# Patient Record
Sex: MALE | Race: WHITE | NOT HISPANIC OR LATINO | Employment: OTHER | URBAN - METROPOLITAN AREA
[De-identification: names, ages, dates, MRNs, and addresses within clinical notes are randomized per-mention and may not be internally consistent; named-entity substitution may affect disease eponyms.]

---

## 2017-01-07 ENCOUNTER — HOSPITAL ENCOUNTER (OUTPATIENT)
Dept: LAB | Facility: MEDICAL CENTER | Age: 72
End: 2017-01-07
Attending: UROLOGY
Payer: MEDICARE

## 2017-01-07 LAB
ALBUMIN SERPL BCP-MCNC: 3.9 G/DL (ref 3.2–4.9)
ALBUMIN/GLOB SERPL: 1.6 G/DL
ALP SERPL-CCNC: 39 U/L (ref 30–99)
ALT SERPL-CCNC: 25 U/L (ref 2–50)
ANION GAP SERPL CALC-SCNC: 7 MMOL/L (ref 0–11.9)
APPEARANCE UR: CLEAR
AST SERPL-CCNC: 25 U/L (ref 12–45)
BASOPHILS # BLD AUTO: 0.03 K/UL (ref 0–0.12)
BASOPHILS NFR BLD AUTO: 0.5 % (ref 0–1.8)
BILIRUB SERPL-MCNC: 0.8 MG/DL (ref 0.1–1.5)
BILIRUB UR QL STRIP.AUTO: NEGATIVE
BUN SERPL-MCNC: 20 MG/DL (ref 8–22)
CALCIUM SERPL-MCNC: 9.3 MG/DL (ref 8.5–10.5)
CHLORIDE SERPL-SCNC: 107 MMOL/L (ref 96–112)
CO2 SERPL-SCNC: 24 MMOL/L (ref 20–33)
COLOR UR AUTO: YELLOW
CREAT SERPL-MCNC: 1.39 MG/DL (ref 0.5–1.4)
EOSINOPHIL # BLD: 0.15 K/UL (ref 0–0.51)
EOSINOPHIL NFR BLD AUTO: 2.7 % (ref 0–6.9)
EPITHELIAL CELLS 1715: ABNORMAL /HPF
ERYTHROCYTE [DISTWIDTH] IN BLOOD BY AUTOMATED COUNT: 44.4 FL (ref 35.9–50)
GLOBULIN SER CALC-MCNC: 2.5 G/DL (ref 1.9–3.5)
GLUCOSE SERPL-MCNC: 97 MG/DL (ref 65–99)
GLUCOSE UR STRIP.AUTO-MCNC: NEGATIVE MG/DL
HCT VFR BLD AUTO: 46.9 % (ref 42–52)
HGB BLD-MCNC: 15.9 G/DL (ref 14–18)
IMM GRANULOCYTES # BLD AUTO: 0.02 K/UL (ref 0–0.11)
IMM GRANULOCYTES NFR BLD AUTO: 0.4 % (ref 0–0.9)
KETONES UR STRIP.AUTO-MCNC: NEGATIVE MG/DL
LEUKOCYTE ESTERASE UR QL STRIP.AUTO: NEGATIVE
LYMPHOCYTES # BLD: 1.82 K/UL (ref 1–4.8)
LYMPHOCYTES NFR BLD AUTO: 32.9 % (ref 22–41)
MCH RBC QN AUTO: 33.1 PG (ref 27–33)
MCHC RBC AUTO-ENTMCNC: 33.9 G/DL (ref 33.7–35.3)
MCV RBC AUTO: 97.7 FL (ref 81.4–97.8)
MICRO URNS: ABNORMAL
MONOCYTES # BLD: 0.46 K/UL (ref 0–0.85)
MONOCYTES NFR BLD AUTO: 8.3 % (ref 0–13.4)
MUCOUS THREADS URNS QL MICRO: ABNORMAL /HPF
NEUTROPHILS # BLD: 3.06 K/UL (ref 1.82–7.42)
NEUTROPHILS NFR BLD AUTO: 55.2 % (ref 44–72)
NITRITE UR QL STRIP.AUTO: NEGATIVE
NRBC # BLD AUTO: 0 K/UL
NRBC BLD-RTO: 0 /100 WBC
PH UR: 5.5 [PH]
PLATELET # BLD AUTO: 281 K/UL (ref 164–446)
PMV BLD AUTO: 10.6 FL (ref 9–12.9)
POTASSIUM SERPL-SCNC: 4.1 MMOL/L (ref 3.6–5.5)
PROT SERPL-MCNC: 6.4 G/DL (ref 6–8.2)
PROT UR QL STRIP: NEGATIVE MG/DL
PSA SERPL DL<=0.01 NG/ML-MCNC: 1.63 NG/ML (ref 0–4)
RBC # BLD AUTO: 4.8 M/UL (ref 4.7–6.1)
RBC #/AREA URNS HPF: ABNORMAL /HPF
RBC UR QL AUTO: ABNORMAL
SODIUM SERPL-SCNC: 138 MMOL/L (ref 135–145)
SP GR UR STRIP.AUTO: 1.02
WBC # BLD AUTO: 5.5 K/UL (ref 4.8–10.8)
WBC #/AREA URNS HPF: ABNORMAL /HPF

## 2017-01-07 PROCEDURE — 81001 URINALYSIS AUTO W/SCOPE: CPT

## 2017-01-07 PROCEDURE — 84153 ASSAY OF PSA TOTAL: CPT

## 2017-01-07 PROCEDURE — 87086 URINE CULTURE/COLONY COUNT: CPT

## 2017-01-07 PROCEDURE — 85025 COMPLETE CBC W/AUTO DIFF WBC: CPT

## 2017-01-07 PROCEDURE — 36415 COLL VENOUS BLD VENIPUNCTURE: CPT

## 2017-01-07 PROCEDURE — 80053 COMPREHEN METABOLIC PANEL: CPT

## 2017-01-09 LAB
BACTERIA UR CULT: NORMAL
SIGNIFICANT IND 70042: NORMAL
SOURCE SOURCE: NORMAL

## 2017-01-15 ENCOUNTER — HOSPITAL ENCOUNTER (OUTPATIENT)
Dept: RADIOLOGY | Facility: MEDICAL CENTER | Age: 72
End: 2017-01-15
Attending: UROLOGY
Payer: MEDICARE

## 2017-01-15 DIAGNOSIS — N20.1 CALCULUS OF URETER: ICD-10-CM

## 2017-01-15 DIAGNOSIS — N20.0 URIC ACID NEPHROLITHIASIS: ICD-10-CM

## 2017-01-15 PROCEDURE — 74000 DX-ABDOMEN-1 VIEW: CPT

## 2017-01-16 ENCOUNTER — APPOINTMENT (OUTPATIENT)
Dept: RADIOLOGY | Facility: MEDICAL CENTER | Age: 72
End: 2017-01-16
Attending: UROLOGY
Payer: MEDICARE

## 2017-01-16 PROBLEM — N20.0 KIDNEY CALCULUS: Status: ACTIVE | Noted: 2017-01-16

## 2017-02-07 ENCOUNTER — TELEPHONE (OUTPATIENT)
Dept: MEDICAL GROUP | Age: 72
End: 2017-02-07

## 2017-02-07 NOTE — Clinical Note
[facility]  Pcp Pt States None  No address on file  Fax: None Authorization for Release/Disclosure of Protected Health Information   Name: DELIA MEJÍA : 1945 SSN: [id number]   Address: [address] [place] [place] [address] Phone:    [phone] (home)    I authorize the entity listed below to release/disclose the PHI below to [facility]/Pcp Pt States None   Provider or Entity Name:    [facility]   Address   City, State, [facility]   Phone:      Fax:[phone]       Reason for request: continuity of care   Information to be released:    [X  ] LAST COLONOSCOPY, including any PATH REPORT [  ] LAST DEXA  [  ] LAST MAMMOGRAM  [  ] LAST PAP [  ] RETINA EXAM REPORT  [  ] IMMUNIZATION RECORDS  [  ] Release all info      [  ] Check here and initial the line next to each item to release ALL health information INCLUDING  _____ Care and treatment for drug and / or alcohol abuse  _____ HIV testing, infection status, or AIDS  _____ Genetic Testing    DATES OF SERVICE OR TIME PERIOD TO BE DISCLOSED: _____________  I understand and acknowledge that:  * This Authorization may be revoked at any time by you in writing, except if your health information has already been used or disclosed.  * Your health information that will be used or disclosed as a result of you signing this authorization could be re-disclosed by the recipient. If this occurs, your re-disclosed health information may no longer be protected by State or Federal laws.  * You may refuse to sign this Authorization. Your refusal will not affect your ability to obtain treatment.  * This Authorization becomes effective upon signing and will  on (date) __________. If no date is indicated, this Authorization will  one (1) year from the signature date.    Name: Delia Mejía    Signature:     Date: 2017

## 2017-02-09 NOTE — TELEPHONE ENCOUNTER
Called Franklin Mejía and left a message to return my call regarding his/her upcoming NP appointment with Kelli Sylvester M.D..   If patient returns the call please transfer them to extension: [phone]

## 2017-02-11 ENCOUNTER — HOSPITAL ENCOUNTER (OUTPATIENT)
Dept: RADIOLOGY | Facility: MEDICAL CENTER | Age: 72
End: 2017-02-11
Attending: UROLOGY
Payer: MEDICARE

## 2017-02-11 DIAGNOSIS — N20.0 URIC ACID NEPHROLITHIASIS: ICD-10-CM

## 2017-02-11 DIAGNOSIS — N20.1 CALCULUS OF URETER: ICD-10-CM

## 2017-02-11 PROCEDURE — 74000 DX-ABDOMEN-1 VIEW: CPT

## 2017-02-14 RX ORDER — TRAMADOL HYDROCHLORIDE 50 MG/1
TABLET ORAL
Refills: 0 | COMMUNITY
Start: 2016-12-16 | End: 2017-02-17

## 2017-02-14 NOTE — TELEPHONE ENCOUNTER
NEW PATIENT PRE-VISIT PLANNING    Called Franklin Mejía in order to verify health topics prior to the New appointment.     1.  All medications were updated? yes    2.  Allergies were updated? yes    3.  All care teams were updated? yes       •   Gait devices, O2, CPAP, etc: no        •   Eye professional: yes       •   Other specialists (GYN, cardiology, endo, etc): yes    4.  All pharmacies were updated? yes          Current Outpatient Prescriptions   Medication Sig Dispense Refill   • tramadol (ULTRAM) 50 MG Tab   0   • oxycodone-acetaminophen (PERCOCET) 5-325 MG Tab Take 1-2 Tabs by mouth every four hours as needed. (Patient not taking: Reported on 2/14/2017) 20 Tab 0   • losartan-hydrochlorothiazide (HYZAAR) 100-12.5 MG per tablet Take 1 Tab by mouth every morning.     • FLUTICASONE PROPIONATE, NASAL, NA Spray 1 Spray in nose every morning.     • aspirin (ASA) 325 MG Tab Take 325 mg by mouth every day.     • Multiple Vitamin (MULTI VITAMIN PO) Take 1 Tab by mouth every day.     • Cyanocobalamin (VITAMIN B-12) 5000 MCG SL Tab Place 1 Tab under tongue every day.     • Cholecalciferol (VITAMIN D3) 5000 UNITS Cap Take 1 Cap by mouth every day.     • tamsulosin (FLOMAX) 0.4 MG capsule Take 1 Cap by mouth ONE-HALF HOUR AFTER BREAKFAST. 30 Cap 0     No current facility-administered medications for this visit.       5.  Patient may be due for these Health Maintenance Topics (update any if possible):            Health Maintenance Due   Topic Date Due   • Annual Wellness Visit  1945   • IMM DTaP/Tdap/Td Vaccine (1 - Tdap) 01/12/1966   • COLONOSCOPY  01/12/1997   • IMM ZOSTER VACCINE  01/12/2007   • IMM PNEUMOCOCCAL 65+ (ADULT) LOW/MEDIUM RISK SERIES (1 of 2 - PCV13) 01/12/2012            a.  CHITRA letter was faxed to:  [facility]  for Last colonoscopy records               6.  Immunizations were updated in [facility] using WebIZ?: Yes        a. Web Iz Recommendations: Tdap, PCV13, Hep A, Hep B, Zoster          7.  Former PCP  records requested?: N\A         8.  Notes to provider or MA:       a. Establish care              Pt was encouraged to keep the appointment and to arrive at least 15-20 minutes early.

## 2017-02-17 ENCOUNTER — OFFICE VISIT (OUTPATIENT)
Dept: MEDICAL GROUP | Age: 72
End: 2017-02-17
Payer: MEDICARE

## 2017-02-17 VITALS
HEIGHT: 71 IN | DIASTOLIC BLOOD PRESSURE: 64 MMHG | WEIGHT: 196.6 LBS | TEMPERATURE: 97.6 F | HEART RATE: 90 BPM | OXYGEN SATURATION: 94 % | SYSTOLIC BLOOD PRESSURE: 110 MMHG | BODY MASS INDEX: 27.52 KG/M2

## 2017-02-17 DIAGNOSIS — I10 ESSENTIAL HYPERTENSION: ICD-10-CM

## 2017-02-17 DIAGNOSIS — E78.2 MIXED HYPERLIPIDEMIA: ICD-10-CM

## 2017-02-17 DIAGNOSIS — J30.9 CHRONIC ALLERGIC RHINITIS: ICD-10-CM

## 2017-02-17 DIAGNOSIS — N40.1 BENIGN NODULAR PROSTATIC HYPERPLASIA WITH LOWER URINARY TRACT SYMPTOMS: ICD-10-CM

## 2017-02-17 PROBLEM — N13.8 BPH WITH OBSTRUCTION/LOWER URINARY TRACT SYMPTOMS: Status: ACTIVE | Noted: 2017-02-17

## 2017-02-17 PROCEDURE — 4040F PNEUMOC VAC/ADMIN/RCVD: CPT | Mod: 8P | Performed by: INTERNAL MEDICINE

## 2017-02-17 PROCEDURE — 1036F TOBACCO NON-USER: CPT | Performed by: INTERNAL MEDICINE

## 2017-02-17 PROCEDURE — G8482 FLU IMMUNIZE ORDER/ADMIN: HCPCS | Performed by: INTERNAL MEDICINE

## 2017-02-17 PROCEDURE — G8432 DEP SCR NOT DOC, RNG: HCPCS | Performed by: INTERNAL MEDICINE

## 2017-02-17 PROCEDURE — 1101F PT FALLS ASSESS-DOCD LE1/YR: CPT | Performed by: INTERNAL MEDICINE

## 2017-02-17 PROCEDURE — 3017F COLORECTAL CA SCREEN DOC REV: CPT | Performed by: INTERNAL MEDICINE

## 2017-02-17 PROCEDURE — 99204 OFFICE O/P NEW MOD 45 MIN: CPT | Performed by: INTERNAL MEDICINE

## 2017-02-17 PROCEDURE — G8420 CALC BMI NORM PARAMETERS: HCPCS | Performed by: INTERNAL MEDICINE

## 2017-02-17 RX ORDER — FLUTICASONE PROPIONATE 50 MCG
2 SPRAY, SUSPENSION (ML) NASAL DAILY
Qty: 3 BOTTLE | Refills: 3 | Status: SHIPPED | OUTPATIENT
Start: 2017-02-17 | End: 2018-02-27 | Stop reason: SDUPTHER

## 2017-02-17 RX ORDER — LOSARTAN POTASSIUM AND HYDROCHLOROTHIAZIDE 12.5; 1 MG/1; MG/1
1 TABLET ORAL EVERY MORNING
Qty: 30 TAB | Refills: 0 | Status: SHIPPED | OUTPATIENT
Start: 2017-02-17 | End: 2017-06-12

## 2017-02-17 RX ORDER — LOSARTAN POTASSIUM AND HYDROCHLOROTHIAZIDE 12.5; 1 MG/1; MG/1
1 TABLET ORAL EVERY MORNING
Qty: 90 TAB | Refills: 3 | Status: SHIPPED | OUTPATIENT
Start: 2017-02-17 | End: 2017-02-17 | Stop reason: SDUPTHER

## 2017-02-17 ASSESSMENT — PAIN SCALES - GENERAL: PAINLEVEL: NO PAIN

## 2017-02-17 ASSESSMENT — PATIENT HEALTH QUESTIONNAIRE - PHQ9: CLINICAL INTERPRETATION OF PHQ2 SCORE: 0

## 2017-02-17 NOTE — ASSESSMENT & PLAN NOTE
He has prostate exam with Dr. Addison annually. He has prostate biopsy 2 years ago was benign. Last PSA was 1.63 on 1/7/17. He is taking Flomax 0.4 mg daily. He has weak urine flow. His symptoms improved with Flomax.

## 2017-02-17 NOTE — PROGRESS NOTES
Franklin Mejía is a 72 y.o. male here to establish care and the evaluation and management of:      HPI:    Essential hypertension  Patient stated that he is taking losartan-hydrochlorothiazide 100-12.5 mg one tablet daily. He denies side effects from taking blood pressure medication. Patient has slightly low GFR and high normal Creatinine on 1/7/17. His creatinine improved from last year. He stated that he started drinking more water upto 64 oz a day. His Urologist, Dr Addison is watching his renal function as well. He just has left kidney stone removal last month. He also had left kidney stone removed 2 years ago.     Chronic allergic rhinitis  Patient has chronic rhinitis with nasal congestion. He reported that he is allergic to dust and pollen. He uses Flonase in the evening time to control his symptoms. His symptom is well controlled with current regimen.    BPH with obstruction/lower urinary tract symptoms  He has prostate exam with Dr. Addison annually. He has prostate biopsy 2 years ago was benign. Last PSA was 1.63 on 1/7/17. He is taking Flomax 0.4 mg daily. He has weak urine flow. His symptoms improved with Flomax.    Mixed hyperlipidemia  Patient has elevated total cholesterol, triglyceride, LDL cholesterol in the past. He has not retested cholesterol last year. He attempts to control cholesterol with diet, exercise. He exercises 5 times a week, one hour each time with cardio and strength exercises.    Current medicines (including changes today)  Current Outpatient Prescriptions   Medication Sig Dispense Refill   • fluticasone (FLONASE) 50 MCG/ACT nasal spray Spray 2 Sprays in nose every day. 3 Bottle 3   • losartan-hydrochlorothiazide (HYZAAR) 100-12.5 MG per tablet Take 1 Tab by mouth every morning. 30 Tab 0   • aspirin (ASA) 325 MG Tab Take 325 mg by mouth every day.     • Multiple Vitamin (MULTI VITAMIN PO) Take 1 Tab by mouth every day.     • Cyanocobalamin (VITAMIN B-12) 5000 MCG SL Tab Place 1 Tab  under tongue every day.     • Cholecalciferol (VITAMIN D3) 5000 UNITS Cap Take 1 Cap by mouth every day.     • tamsulosin (FLOMAX) 0.4 MG capsule Take 1 Cap by mouth ONE-HALF HOUR AFTER BREAKFAST. 30 Cap 0     No current facility-administered medications for this visit.     He  has a past medical history of Hypertension; Kidney stone; and Hyperlipidemia.  He  has past surgical history that includes other orthopedic surgery (); ureteroscopy (Left, 2017); and lasertripsy (2017).  Social History   Substance Use Topics   • Smoking status: Former Smoker -- 1.00 packs/day for 15 years     Types: Cigarettes     Quit date: 1985   • Smokeless tobacco: Never Used   • Alcohol Use: 3.0 oz/week     5 Shots of liquor per week      Comment: 1 per day     Social History     Social History Narrative     Family History   Problem Relation Age of Onset   • Cancer Mother      colon cancer   • Heart Disease Brother    • No Known Problems Maternal Grandmother    • Hypertension Maternal Grandfather      Family Status   Relation Status Death Age   • Mother     • Father  60   • Brother Alive    • Maternal Grandmother  86   • Maternal Grandfather     • Paternal Grandmother     • Paternal Grandfather       Health Maintenance Topics with due status: Overdue       Topic Date Due    Annual Wellness Visit 1945    IMM DTaP/Tdap/Td Vaccine 1966    COLONOSCOPY 1997    IMM ZOSTER VACCINE 2007    IMM PNEUMOCOCCAL 65+ (ADULT) LOW/MEDIUM RISK SERIES 2012         ROS    Gen.: Denied weight change, appetite change, fatigue.  ENT: Denied sinus tenderness, nasal congestion, runny nose, or sore throat  CVS: Denied chest pain, palpitations, legs swelling.  Respiratory: Denied cough, shortness of breath, wheezing.  GI: Denied abdominal pain, constipation or diarrhea.  Endocrine: Denied temperature intolerance, increased frequency of urination, polyphagia or  "polydipsia.  Musculoskeletal: Denied back pain or joint pain.    All other systems reviewed and are negative     Objective:     Blood pressure 110/64, pulse 90, temperature 36.4 °C (97.6 °F), height 1.803 m (5' 11\"), weight 89.177 kg (196 lb 9.6 oz), SpO2 94 %. Body mass index is 27.43 kg/(m^2).  Physical Exam:    Constitutional: Well nourished and Well developed, Alert, no distress.  Skin: Warm, dry, good turgor, no rashes in visible areas.  Eye: Equal, round and reactive, conjunctiva clear, lids normal.  ENMT: Lips without lesions, good dentition, oropharynx clear.  Neck: Trachea midline, no masses, no thyromegaly. No cervical or supraclavicular lymphadenopathy.  Respiratory: Unlabored respiratory effort, lungs clear to auscultation, no wheezes, no ronchi.  Cardiovascular: Normal S1, S2, no murmur, no edema. No carotid bruits.  Abdomen: Soft, non distended, non-tender, no masses, no hepatosplenomegaly. Bowel sound normal.  Extremities: No edema, no clubbing, no cyanosis.  Psych: Alert and oriented x3, normal affect and mood.      Assessment and Plan:   The following treatment plan was discussed       1. Essential hypertension  - Well-controlled. Continue current regimens. Recheck lab 1-2 weeks before next follow up visit.  - Recommend to increase water intake. This Patient to consider discontinue hydrochlorothiazide if kidney function gets worse in future.  - losartan-hydrochlorothiazide (HYZAAR) 100-12.5 MG per tablet; Take 1 Tab by mouth every morning.  Dispense: 30 Tab; Refill: 0  - CBC WITH DIFFERENTIAL; Future  - COMP METABOLIC PANEL; Future    2. Chronic allergic rhinitis  - Well-controlled. Continue current regimens. Recheck lab 1-2 weeks before next follow up visit. Refill Flonase.  - fluticasone (FLONASE) 50 MCG/ACT nasal spray; Spray 2 Sprays in nose every day.  Dispense: 3 Bottle; Refill: 3  - CBC WITH DIFFERENTIAL; Future  - COMP METABOLIC PANEL; Future    3. Benign nodular prostatic hyperplasia with " lower urinary tract symptoms  - Symptoms improved with Flomax. PSA within normal.  - Follow up with urologist as scheduled.    4. Mixed hyperlipidemia  - Not on medication. Continue to control with diet and exercise.  - Advised to eat low fat, low carbohydrate and high fiber diet as well as do cardio physical exercise regularly.   - COMP METABOLIC PANEL; Future  - LIPID PROFILE; Future    Health maintenance: Patient has normal colonoscopy in 2012. He follows with digestive health. He needs to repeat colonoscopy in 5 years due to family history of colon cancer in mom. He will contact GI for colonoscopy. He reported that he received pneumonia vaccine with prior PCP, but he did not recall what type of pneumonia vaccine. We will request medical records from his previous PCP.    Records requested.  Followup: Return in about 3 months (around 5/17/2017), or if symptoms worsen or fail to improve, for hypertension, hyperlipidemia, chronic allergic rhinitis, BPH, lab review. sooner should new symptoms or problems arise.      Please note that this dictation was created using voice recognition software. I have made every reasonable attempt to correct obvious errors, but I expect that there may have unintended errors in text, spelling, punctuation, or grammar that I did not discover.

## 2017-02-17 NOTE — ASSESSMENT & PLAN NOTE
Patient has elevated total cholesterol, triglyceride, LDL cholesterol in the past. He has not retested cholesterol last year. He attempts to control cholesterol with diet, exercise. He exercises 5 times a week, one hour each time with cardio and strength exercises.

## 2017-02-17 NOTE — MR AVS SNAPSHOT
"        Franklin Mejía   2017 8:20 AM   Office Visit   MRN: [id number]    Department:  [address] [place] [address]   Dept Phone:  [phone]    Description:  Male : 1945   Provider:  Kelli Sylvester M.D.           Reason for Visit     Establish Care med refill    Hypertension     Hyperlipidemia           Allergies as of 2017     No Known Allergies      You were diagnosed with     Essential hypertension   [2496059]       Chronic allergic rhinitis   [425428]       Benign nodular prostatic hyperplasia with lower urinary tract symptoms   [0468856]       Mixed hyperlipidemia   [272.2.ICD-9-CM]         Vital Signs     Blood Pressure Pulse Temperature Height Weight Body Mass Index    110/64 mmHg 90 36.4 °C (97.6 °F) 1.803 m (5' 11\") 89.177 kg (196 lb 9.6 oz) 27.43 kg/m2    Oxygen Saturation Smoking Status                94% Former Smoker          Basic Information     Date Of Birth Sex Race Ethnicity Preferred Language    1945 Male White Non- English      Your appointments     2017 11:20 AM   Established Patient with Kelli Sylvester M.D.   [address] [id number] [phone]           You will be receiving a confirmation call a few days before your appointment from our automated call confirmation system.              Problem List              ICD-10-CM Priority Class Noted - Resolved    Kidney calculus N20.0   2017 - Present    Essential hypertension I10   2017 - Present    Chronic allergic rhinitis J30.9   2017 - Present    BPH with obstruction/lower urinary tract symptoms N40.1, N13.8   2017 - Present    Mixed hyperlipidemia E78.2   2017 - Present      Health Maintenance        Date Due Completion Dates    IMM DTaP/Tdap/Td Vaccine (1 - Tdap) 1966 ---    COLONOSCOPY 1997 ---    IMM ZOSTER VACCINE 2007 ---    IMM PNEUMOCOCCAL 65+ (ADULT) LOW/MEDIUM RISK SERIES (1 of 2 - PCV13) " 1/12/2012 ---            Current Immunizations     Influenza Vaccine Adult HD 12/12/2016    Influenza Vaccine Quad Inj (Pf) 11/12/2015, 11/10/2014      Below and/or attached are the medications your provider expects you to take. Review all of your home medications and newly ordered medications with your provider and/or pharmacist. Follow medication instructions as directed by your provider and/or pharmacist. Please keep your medication list with you and share with your provider. Update the information when medications are discontinued, doses are changed, or new medications (including over-the-counter products) are added; and carry medication information at all times in the event of emergency situations     Allergies:  No Known Allergies          Medications  Valid as of: February 17, 2017 -  9:21 AM    Generic Name Brand Name Tablet Size Instructions for use    Aspirin (Tab)  MG Take 325 mg by mouth every day.        Cholecalciferol (Cap) vitamin D3 5000 UNITS Take 1 Cap by mouth every day.        Cyanocobalamin (SL Tab) Vitamin B-12 5000 MCG Place 1 Tab under tongue every day.        Fluticasone Propionate (Suspension) FLONASE 50 MCG/ACT Spray 2 Sprays in nose every day.        Losartan Potassium-HCTZ (Tab) HYZAAR 100-12.5 MG Take 1 Tab by mouth every morning.        Multiple Vitamin   Take 1 Tab by mouth every day.        Tamsulosin HCl (Cap) FLOMAX 0.4 MG Take 1 Cap by mouth ONE-HALF HOUR AFTER BREAKFAST.        .                 Medicines prescribed today were sent to:     [facility] [place] - [address]    [address] [address]    Phone: [phone] Fax: [phone]    Open 24 Hours?: No    [id number] [place] [place] [facility] [address] [facility]    [address] [facility] [place] [address]    Phone: [phone] Fax: [phone]    Open 24 Hours?: No      Medication refill instructions:       If your prescription bottle indicates you have  medication refills left, it is not necessary to call your provider’s office. Please contact your pharmacy and they will refill your medication.    If your prescription bottle indicates you do not have any refills left, you may request refills at any time through one of the following ways: The online [employer] system (except Urgent Care), by calling your provider’s office, or by asking your pharmacy to contact your provider’s office with a refill request. Medication refills are processed only during regular business hours and may not be available until the next business day. Your provider may request additional information or to have a follow-up visit with you prior to refilling your medication.   *Please Note: Medication refills are assigned a new Rx number when refilled electronically. Your pharmacy may indicate that no refills were authorized even though a new prescription for the same medication is available at the pharmacy. Please request the medicine by name with the pharmacy before contacting your provider for a refill.        Your To Do List     Future Labs/Procedures Complete By Expires    CBC WITH DIFFERENTIAL  As directed 2/18/2018    COMP METABOLIC PANEL  As directed 2/18/2018    LIPID PROFILE  As directed 2/18/2018         [employer] Access Code: Activation code not generated  Current [employer] Status: Active

## 2017-02-17 NOTE — ASSESSMENT & PLAN NOTE
Patient stated that he is taking losartan-hydrochlorothiazide 100-12.5 mg one tablet daily. He denies side effects from taking blood pressure medication. Patient has slightly low GFR and high normal Creatinine on 1/7/17. His creatinine improved from last year. He stated that he started drinking more water upto 64 oz a day. His Urologist, Dr Addison is watching his renal function as well. He just has left kidney stone removal last month. He also had left kidney stone removed 2 years ago.

## 2017-02-17 NOTE — Clinical Note
[facility]  Kelli Sylvester M.D.  [address]  [place] [address]  Fax: [phone]   Authorization for Release/Disclosure of   Protected Health Information   Name: FRANKLIN LOPEZ : 1945 SSN: [id number]   Address: [address] [place] [address]  [place] [address] Phone:    [phone] (home)    I authorize the entity listed below to release/disclose the PHI below to:   [facility]/Kelli Sylvester M.D. and Kelli Sylvester M.D.   Provider or Entity Name:     Address   City, State, Zip   Phone:      Fax:     Reason for request: continuity of care   Information to be released:    [  ] LAST COLONOSCOPY,  including any PATH REPORT and follow-up  [  ] LAST FIT/COLOGUARD RESULT [  ] LAST DEXA  [  ] LAST MAMMOGRAM  [  ] LAST PAP  [  ] LAST LABS [  ] RETINA EXAM REPORT  [  ] IMMUNIZATION RECORDS  [  ] Release all info      [  ] Check here and initial the line next to each item to release ALL health information INCLUDING  _____ Care and treatment for drug and / or alcohol abuse  _____ HIV testing, infection status, or AIDS  _____ Genetic Testing    DATES OF SERVICE OR TIME PERIOD TO BE DISCLOSED: _____________  I understand and acknowledge that:  * This Authorization may be revoked at any time by you in writing, except if your health information has already been used or disclosed.  * Your health information that will be used or disclosed as a result of you signing this authorization could be re-disclosed by the recipient. If this occurs, your re-disclosed health information may no longer be protected by State or Federal laws.  * You may refuse to sign this Authorization. Your refusal will not affect your ability to obtain treatment.  * This Authorization becomes effective upon signing and will  on (date) __________.      If no date is indicated, this Authorization will  one (1) year from the signature date.    Name: Franklin Lopez    Signature:   Date:     2017       PLEASE FAX REQUESTED  RECORDS BACK TO: [phone]

## 2017-02-17 NOTE — ASSESSMENT & PLAN NOTE
Patient has chronic rhinitis with nasal congestion. He reported that he is allergic to dust and pollen. He uses Flonase in the evening time to control his symptoms. His symptom is well controlled with current regimen.

## 2017-06-05 ENCOUNTER — HOSPITAL ENCOUNTER (OUTPATIENT)
Dept: LAB | Facility: MEDICAL CENTER | Age: 72
End: 2017-06-05
Attending: INTERNAL MEDICINE
Payer: MEDICARE

## 2017-06-05 DIAGNOSIS — J30.9 CHRONIC ALLERGIC RHINITIS: ICD-10-CM

## 2017-06-05 DIAGNOSIS — I10 ESSENTIAL HYPERTENSION: ICD-10-CM

## 2017-06-05 DIAGNOSIS — E78.2 MIXED HYPERLIPIDEMIA: ICD-10-CM

## 2017-06-05 LAB
ALBUMIN SERPL BCP-MCNC: 4 G/DL (ref 3.2–4.9)
ALBUMIN/GLOB SERPL: 1.5 G/DL
ALP SERPL-CCNC: 46 U/L (ref 30–99)
ALT SERPL-CCNC: 23 U/L (ref 2–50)
ANION GAP SERPL CALC-SCNC: 6 MMOL/L (ref 0–11.9)
AST SERPL-CCNC: 19 U/L (ref 12–45)
BASOPHILS # BLD AUTO: 0.7 % (ref 0–1.8)
BASOPHILS # BLD: 0.04 K/UL (ref 0–0.12)
BILIRUB SERPL-MCNC: 0.9 MG/DL (ref 0.1–1.5)
BUN SERPL-MCNC: 22 MG/DL (ref 8–22)
CALCIUM SERPL-MCNC: 9.3 MG/DL (ref 8.5–10.5)
CHLORIDE SERPL-SCNC: 106 MMOL/L (ref 96–112)
CHOLEST SERPL-MCNC: 188 MG/DL (ref 100–199)
CO2 SERPL-SCNC: 27 MMOL/L (ref 20–33)
CREAT SERPL-MCNC: 1.52 MG/DL (ref 0.5–1.4)
EOSINOPHIL # BLD AUTO: 0.11 K/UL (ref 0–0.51)
EOSINOPHIL NFR BLD: 1.9 % (ref 0–6.9)
ERYTHROCYTE [DISTWIDTH] IN BLOOD BY AUTOMATED COUNT: 44.6 FL (ref 35.9–50)
GFR SERPL CREATININE-BSD FRML MDRD: 46 ML/MIN/1.73 M 2
GLOBULIN SER CALC-MCNC: 2.7 G/DL (ref 1.9–3.5)
GLUCOSE SERPL-MCNC: 91 MG/DL (ref 65–99)
HCT VFR BLD AUTO: 48.2 % (ref 42–52)
HDLC SERPL-MCNC: 45 MG/DL
HGB BLD-MCNC: 16.3 G/DL (ref 14–18)
IMM GRANULOCYTES # BLD AUTO: 0.03 K/UL (ref 0–0.11)
IMM GRANULOCYTES NFR BLD AUTO: 0.5 % (ref 0–0.9)
LDLC SERPL CALC-MCNC: 103 MG/DL
LYMPHOCYTES # BLD AUTO: 1.57 K/UL (ref 1–4.8)
LYMPHOCYTES NFR BLD: 26.6 % (ref 22–41)
MCH RBC QN AUTO: 32.5 PG (ref 27–33)
MCHC RBC AUTO-ENTMCNC: 33.8 G/DL (ref 33.7–35.3)
MCV RBC AUTO: 96.2 FL (ref 81.4–97.8)
MONOCYTES # BLD AUTO: 0.51 K/UL (ref 0–0.85)
MONOCYTES NFR BLD AUTO: 8.6 % (ref 0–13.4)
NEUTROPHILS # BLD AUTO: 3.65 K/UL (ref 1.82–7.42)
NEUTROPHILS NFR BLD: 61.7 % (ref 44–72)
NRBC # BLD AUTO: 0 K/UL
NRBC BLD AUTO-RTO: 0 /100 WBC
PLATELET # BLD AUTO: 266 K/UL (ref 164–446)
PMV BLD AUTO: 10.7 FL (ref 9–12.9)
POTASSIUM SERPL-SCNC: 4.3 MMOL/L (ref 3.6–5.5)
PROT SERPL-MCNC: 6.7 G/DL (ref 6–8.2)
RBC # BLD AUTO: 5.01 M/UL (ref 4.7–6.1)
SODIUM SERPL-SCNC: 139 MMOL/L (ref 135–145)
TRIGL SERPL-MCNC: 200 MG/DL (ref 0–149)
WBC # BLD AUTO: 5.9 K/UL (ref 4.8–10.8)

## 2017-06-05 PROCEDURE — 36415 COLL VENOUS BLD VENIPUNCTURE: CPT

## 2017-06-05 PROCEDURE — 85025 COMPLETE CBC W/AUTO DIFF WBC: CPT

## 2017-06-05 PROCEDURE — 80061 LIPID PANEL: CPT

## 2017-06-05 PROCEDURE — 80053 COMPREHEN METABOLIC PANEL: CPT

## 2017-06-06 NOTE — PROGRESS NOTES
Quick Note:    I will discuss the result in upcoming appointment.     Kelli Sylvester MD    ______

## 2017-06-09 ENCOUNTER — TELEPHONE (OUTPATIENT)
Dept: MEDICAL GROUP | Age: 72
End: 2017-06-09

## 2017-06-09 NOTE — TELEPHONE ENCOUNTER
ESTABLISHED PATIENT PRE-VISIT PLANNING     Note: Patient will not be contacted if there is no indication to call.     1.  Reviewed notes from the last few office visits within the medical group: Yes    2.  If any orders were placed at last visit or intended to be done for this visit (i.e. 6 mos follow-up), do we have Results/Consult Notes?        •  Labs - Labs ordered, completed and results are in chart.       •  Imaging - Imaging was not ordered at last office visit.       •  Referrals - No referrals were ordered at last office visit.    3. Is this appointment scheduled as a Hospital Follow-Up? No    4.  Immunizations were updated in [employer] using WebIZ?: Yes       •  Web Iz Recommendations: HEPATITIS A  TDAP ZOSTAVAX (Shingles)    5.  Patient is due for the following Health Maintenance Topics:   Health Maintenance Due   Topic Date Due   • Annual Wellness Visit  1945   • IMM DTaP/Tdap/Td Vaccine (1 - Tdap) 01/12/1966   • COLONOSCOPY  01/12/1997   • IMM ZOSTER VACCINE  01/12/2007   • IMM PNEUMOCOCCAL 65+ (ADULT) LOW/MEDIUM RISK SERIES (1 of 2 - PCV13) 01/12/2012       - Patient has completed FLU Immunization(s) per WebIZ. Chart has been updated.    6.  Patient was NOT informed to arrive 15 min prior to their scheduled appointment and bring in their medication bottles.

## 2017-06-12 ENCOUNTER — OFFICE VISIT (OUTPATIENT)
Dept: MEDICAL GROUP | Age: 72
End: 2017-06-12
Payer: MEDICARE

## 2017-06-12 VITALS
BODY MASS INDEX: 26.74 KG/M2 | HEART RATE: 85 BPM | DIASTOLIC BLOOD PRESSURE: 66 MMHG | TEMPERATURE: 97.3 F | SYSTOLIC BLOOD PRESSURE: 110 MMHG | WEIGHT: 191 LBS | HEIGHT: 71 IN | OXYGEN SATURATION: 95 %

## 2017-06-12 DIAGNOSIS — N13.8 BPH WITH OBSTRUCTION/LOWER URINARY TRACT SYMPTOMS: ICD-10-CM

## 2017-06-12 DIAGNOSIS — I10 ESSENTIAL HYPERTENSION: ICD-10-CM

## 2017-06-12 DIAGNOSIS — Z23 NEED FOR PNEUMOCOCCAL VACCINE: ICD-10-CM

## 2017-06-12 DIAGNOSIS — N40.1 BPH WITH OBSTRUCTION/LOWER URINARY TRACT SYMPTOMS: ICD-10-CM

## 2017-06-12 DIAGNOSIS — E78.2 MIXED HYPERLIPIDEMIA: ICD-10-CM

## 2017-06-12 DIAGNOSIS — N28.9 IMPAIRED RENAL FUNCTION: ICD-10-CM

## 2017-06-12 PROCEDURE — 99214 OFFICE O/P EST MOD 30 MIN: CPT | Mod: 25 | Performed by: INTERNAL MEDICINE

## 2017-06-12 PROCEDURE — 90670 PCV13 VACCINE IM: CPT | Performed by: INTERNAL MEDICINE

## 2017-06-12 PROCEDURE — G0009 ADMIN PNEUMOCOCCAL VACCINE: HCPCS | Performed by: INTERNAL MEDICINE

## 2017-06-12 RX ORDER — LOSARTAN POTASSIUM 100 MG/1
100 TABLET ORAL DAILY
Qty: 90 TAB | Refills: 3 | Status: SHIPPED | OUTPATIENT
Start: 2017-06-12 | End: 2018-05-19 | Stop reason: SDUPTHER

## 2017-06-12 ASSESSMENT — PAIN SCALES - GENERAL: PAINLEVEL: NO PAIN

## 2017-06-12 NOTE — MR AVS SNAPSHOT
"        Franklin Naik Alfonzo   2017 11:20 AM   Office Visit   MRN: [id number]    Department:  [address] [place] [address]   Dept Phone:  [phone]    Description:  Male : 1945   Provider:  Kelli Sylvester M.D.           Reason for Visit     Hypertension lab review      Allergies as of 2017     No Known Allergies      You were diagnosed with     Essential hypertension   [4052656]       Mixed hyperlipidemia   [272.2.ICD-9-CM]       BPH with obstruction/lower urinary tract symptoms   [274901]       Need for pneumococcal vaccine   [[id number]]         Vital Signs     Blood Pressure Pulse Temperature Height Weight Body Mass Index    110/66 mmHg 85 36.3 °C (97.3 °F) 1.803 m (5' 10.98\") 86.637 kg (191 lb) 26.65 kg/m2    Oxygen Saturation Smoking Status                95% Former Smoker          Basic Information     Date Of Birth Sex Race Ethnicity Preferred Language    1945 Male White Non- English      Your appointments     Sep 12, 2017 11:40 AM   Established Patient with Kelli Sylvester M.D.   [address] [facility] [place] [id number] [phone]           You will be receiving a confirmation call a few days before your appointment from our automated call confirmation system.            2018  2:00 PM   ANNUAL WELLNESS with Kelli Sylvester M.D., [facility]    [address] [employer] [place] [id number] [phone]              Problem List              ICD-10-CM Priority Class Noted - Resolved    Kidney calculus N20.0   2017 - Present    Essential hypertension I10   2017 - Present    Chronic allergic rhinitis J30.9   2017 - Present    BPH with obstruction/lower urinary tract symptoms N40.1, N13.8   2017 - Present    Mixed hyperlipidemia E78.2   2017 - Present      Health Maintenance        Date Due Completion Dates    IMM DTaP/Tdap/Td Vaccine (1 - Tdap) 1966 ---   " COLONOSCOPY 1/12/1997 ---    IMM ZOSTER VACCINE 1/12/2007 ---    IMM PNEUMOCOCCAL 65+ (ADULT) LOW/MEDIUM RISK SERIES (1 of 2 - PCV13) 1/12/2012 ---            Current Immunizations     13-VALENT PCV PREVNAR  Incomplete    Influenza Vaccine Adult HD 12/12/2016    Influenza Vaccine Quad Inj (Pf) 11/12/2015, 11/10/2014      Below and/or attached are the medications your provider expects you to take. Review all of your home medications and newly ordered medications with your provider and/or pharmacist. Follow medication instructions as directed by your provider and/or pharmacist. Please keep your medication list with you and share with your provider. Update the information when medications are discontinued, doses are changed, or new medications (including over-the-counter products) are added; and carry medication information at all times in the event of emergency situations     Allergies:  No Known Allergies          Medications  Valid as of: June 12, 2017 - 12:03 PM    Generic Name Brand Name Tablet Size Instructions for use    Aspirin (Tab)  MG Take 325 mg by mouth every day.        Cholecalciferol (Cap) vitamin D3 5000 UNITS Take 1 Cap by mouth every day.        Cyanocobalamin (SL Tab) Vitamin B-12 5000 MCG Place 1 Tab under tongue every day.        Fluticasone Propionate (Suspension) FLONASE 50 MCG/ACT Spray 2 Sprays in nose every day.        Losartan Potassium (Tab) COZAAR 100 MG Take 1 Tab by mouth every day.        Multiple Vitamin   Take 1 Tab by mouth every day.        Tamsulosin HCl (Cap) FLOMAX 0.4 MG Take 1 Cap by mouth ONE-HALF HOUR AFTER BREAKFAST.        .                 Medicines prescribed today were sent to:     [facility] [place] - [address]    [address] [address]    Phone: [phone] Fax: [phone]    Open 24 Hours?: No    [id number] - [place], [place] - [address]    [address] [facility] [place] [address]     Phone: [phone] Fax: [phone]    Open 24 Hours?: No      Medication refill instructions:       If your prescription bottle indicates you have medication refills left, it is not necessary to call your provider’s office. Please contact your pharmacy and they will refill your medication.    If your prescription bottle indicates you do not have any refills left, you may request refills at any time through one of the following ways: The online Responsa system (except Urgent Care), by calling your provider’s office, or by asking your pharmacy to contact your provider’s office with a refill request. Medication refills are processed only during regular business hours and may not be available until the next business day. Your provider may request additional information or to have a follow-up visit with you prior to refilling your medication.   *Please Note: Medication refills are assigned a new Rx number when refilled electronically. Your pharmacy may indicate that no refills were authorized even though a new prescription for the same medication is available at the pharmacy. Please request the medicine by name with the pharmacy before contacting your provider for a refill.        Your To Do List     Future Labs/Procedures Complete By Expires    COMP METABOLIC PANEL  As directed 6/13/2018    LIPID PROFILE  As directed 6/13/2018         Responsa Access Code: Activation code not generated  Current Responsa Status: Active

## 2017-06-12 NOTE — ASSESSMENT & PLAN NOTE
Patient has elevated triglycerides and LDL cholesterol. He has never been treated with medication. I reviewed recent blood tests and previous blood tests with patient. His ASCVD score was 19.1 %. Patient still wants to control his cholesterol with diet and exercise. We discussed to take omega-3. Patient stated that if his cholesterol level remains high in 3 months, he will consider statin treatment.

## 2017-06-12 NOTE — ASSESSMENT & PLAN NOTE
Patient is taking losartan-hydrochlorothiazide 100-12.5 mg daily. His blood pressure is well controlled with current regimens. However, patient has elevated creatinine with lowering GFR. He denied taking NSAIDs. Patient has kidney stones on the left kidney. He passed 5 mm kidney stone in January 2017 and then has 14 mm kidney stone removed by urologist in January 2017. He denies flank pain or dysuria or hematuria. We discussed to discontinue hydrochlorothiazide due to worsening kidney function. Patient agreed with the plan. We will continue losartan and 100 mg daily and closely monitor blood pressure. Patient is advised to call our clinic for follow-up. His blood pressure is not well controlled with losartan alone. He agrees with the plan. He will need to repeat blood tests to follow up on kidney function and cholesterol in 3 months.

## 2017-06-12 NOTE — ASSESSMENT & PLAN NOTE
Patient has normal PSA at 1.63 on 1/7/17. He is taking Flomax every morning. His symptom is well-controlled. His urology did digital rectal exam in January. He was told to follow up once a year only. He is asymptomatic currently. Patient reported that he has prostate biopsy 2- 3 years ago and that was negative.

## 2017-06-12 NOTE — PROGRESS NOTES
Subjective:   Franklin Mejía is a 72 y.o. male here today for evaluation and management of:      Mixed hyperlipidemia  Patient has elevated triglycerides and LDL cholesterol. He has never been treated with medication. I reviewed recent blood tests and previous blood tests with patient. His ASCVD score was 19.1 %. Patient still wants to control his cholesterol with diet and exercise. We discussed to take omega-3. Patient stated that if his cholesterol level remains high in 3 months, he will consider statin treatment.    Essential hypertension  Patient is taking losartan-hydrochlorothiazide 100-12.5 mg daily. His blood pressure is well controlled with current regimens. However, patient has elevated creatinine with lowering GFR. He denied taking NSAIDs. Patient has kidney stones on the left kidney. He passed 5 mm kidney stone in January 2017 and then has 14 mm kidney stone removed by urologist in January 2017. He denies flank pain or dysuria or hematuria. We discussed to discontinue hydrochlorothiazide due to worsening kidney function. Patient agreed with the plan. We will continue losartan and 100 mg daily and closely monitor blood pressure. Patient is advised to call our clinic for follow-up. His blood pressure is not well controlled with losartan alone. He agrees with the plan. He will need to repeat blood tests to follow up on kidney function and cholesterol in 3 months.    BPH with obstruction/lower urinary tract symptoms  Patient has normal PSA at 1.63 on 1/7/17. He is taking Flomax every morning. His symptom is well-controlled. His urology did digital rectal exam in January. He was told to follow up once a year only. He is asymptomatic currently. Patient reported that he has prostate biopsy 2- 3 years ago and that was negative.           Current medicines (including changes today)  Current Outpatient Prescriptions   Medication Sig Dispense Refill   • losartan (COZAAR) 100 MG Tab Take 1 Tab by mouth every  "day. 90 Tab 3   • fluticasone (FLONASE) 50 MCG/ACT nasal spray Spray 2 Sprays in nose every day. 3 Bottle 3   • aspirin (ASA) 325 MG Tab Take 325 mg by mouth every day.     • Multiple Vitamin (MULTI VITAMIN PO) Take 1 Tab by mouth every day.     • Cyanocobalamin (VITAMIN B-12) 5000 MCG SL Tab Place 1 Tab under tongue every day.     • Cholecalciferol (VITAMIN D3) 5000 UNITS Cap Take 1 Cap by mouth every day.     • tamsulosin (FLOMAX) 0.4 MG capsule Take 1 Cap by mouth ONE-HALF HOUR AFTER BREAKFAST. 30 Cap 0     No current facility-administered medications for this visit.     He  has a past medical history of Hypertension; Kidney stone; and Hyperlipidemia.    ROS   No chest pain, no shortness of breath, no abdominal pain       Objective:     Blood pressure 110/66, pulse 85, temperature 36.3 °C (97.3 °F), height 1.803 m (5' 10.98\"), weight 86.637 kg (191 lb), SpO2 95 %. Body mass index is 26.65 kg/(m^2).   Physical Exam:  General: Alert, oriented and no acute distress.  Eye contact is good, speech goal directed, affect calm  HEENT: conjunctiva non-injected, sclera non-icteric.  Oral mucous membranes pink and moist with no lesions.  Pinna normal.   Lungs: Normal respiratory effort, clear to auscultation bilaterally with good excursion.  CV: regular rate and rhythm. No murmurs.   Abdomen: soft, non distended, nontender, No CVAT, Bowel sound normal.  Ext: no edema, color normal, vascularity normal, temperature normal        Assessment and Plan:   The following treatment plan was discussed     1. Essential hypertension  - Discontinue hydrochlorothiazide due to worsening kidney function. Will continue losartan 100 mg daily. Patient is advised to closely monitor blood pressure and pulse at home.  - He is advised to call our clinic for follow-up. His blood pressure is not well controlled with losartan alone. Recommend to limit amount of alcohol intake.  - losartan (COZAAR) 100 MG Tab; Take 1 Tab by mouth every day.  Dispense: " 90 Tab; Refill: 3  - COMP METABOLIC PANEL; Future    2. Mixed hyperlipidemia  - Patient still wants to control cholesterol with diet and exercise.  - Advised to eat low fat, low carbohydrate and high fiber diet as well as do cardio physical exercise regularly.   - Advised to take omega-3 1200 mg twice a day. Continue aspirin daily.  - COMP METABOLIC PANEL; Future  - LIPID PROFILE; Future    3. BPH with obstruction/lower urinary tract symptoms  - Well-controlled with tamsulosin 0.4 mg daily. Has normal PSA on 1/7/17.    4. Impaired renal function  - Discussed possible causes with patient. It can be multifactorial from kidney stones, taking diuretics, high blood pressure.  - Discontinue hydrochlorothiazide today.  - Recommend to increase water intake to keep well hydrated.  - Advised to avoid NSAIDs.  - He already removed kidney stone 14 mm on the left kidney with urologist in January 2017.  - We will recheck blood test in 3 months for follow-up.    5. Need for pneumococcal vaccine  - Prevnar 13 vaccine was given today after reviewing risks and benefits as well as side effects of vaccine.  - PNEUMOCOCCAL CONJUGATE VACCINE 13-VALENT    6. Health Maintenance   - Patient stated that he will follow with digestive [facility] for colonoscopy this year. We also discussed shingles vaccine. Patient declined to have shingles vaccine currently. He will think about to have shingles vaccine in the future.    Followup: Return in about 3 months (around 9/12/2017), or if symptoms worsen or fail to improve, for hypertension, dyslipidemia, BPH, impaired kidney function. Lab review.      Please note that this dictation was created using voice recognition software. I have made every reasonable attempt to correct obvious errors, but I expect that there may have unintended errors in text, spelling, punctuation, or grammar that I did not discover.

## 2017-09-08 ENCOUNTER — HOSPITAL ENCOUNTER (OUTPATIENT)
Dept: LAB | Facility: MEDICAL CENTER | Age: 72
End: 2017-09-08
Attending: INTERNAL MEDICINE
Payer: MEDICARE

## 2017-09-08 DIAGNOSIS — I10 ESSENTIAL HYPERTENSION: ICD-10-CM

## 2017-09-08 DIAGNOSIS — E78.2 MIXED HYPERLIPIDEMIA: ICD-10-CM

## 2017-09-08 LAB
ALBUMIN SERPL BCP-MCNC: 4 G/DL (ref 3.2–4.9)
ALBUMIN/GLOB SERPL: 1.4 G/DL
ALP SERPL-CCNC: 44 U/L (ref 30–99)
ALT SERPL-CCNC: 23 U/L (ref 2–50)
ANION GAP SERPL CALC-SCNC: 8 MMOL/L (ref 0–11.9)
AST SERPL-CCNC: 20 U/L (ref 12–45)
BILIRUB SERPL-MCNC: 0.7 MG/DL (ref 0.1–1.5)
BUN SERPL-MCNC: 20 MG/DL (ref 8–22)
CALCIUM SERPL-MCNC: 9.2 MG/DL (ref 8.5–10.5)
CHLORIDE SERPL-SCNC: 107 MMOL/L (ref 96–112)
CHOLEST SERPL-MCNC: 182 MG/DL (ref 100–199)
CO2 SERPL-SCNC: 24 MMOL/L (ref 20–33)
CREAT SERPL-MCNC: 1.39 MG/DL (ref 0.5–1.4)
FASTING STATUS PATIENT QL REPORTED: NORMAL
GFR SERPL CREATININE-BSD FRML MDRD: 50 ML/MIN/1.73 M 2
GLOBULIN SER CALC-MCNC: 2.8 G/DL (ref 1.9–3.5)
GLUCOSE SERPL-MCNC: 85 MG/DL (ref 65–99)
HDLC SERPL-MCNC: 47 MG/DL
LDLC SERPL CALC-MCNC: 92 MG/DL
POTASSIUM SERPL-SCNC: 4.3 MMOL/L (ref 3.6–5.5)
PROT SERPL-MCNC: 6.8 G/DL (ref 6–8.2)
SODIUM SERPL-SCNC: 139 MMOL/L (ref 135–145)
TRIGL SERPL-MCNC: 217 MG/DL (ref 0–149)

## 2017-09-08 PROCEDURE — 36415 COLL VENOUS BLD VENIPUNCTURE: CPT

## 2017-09-08 PROCEDURE — 80053 COMPREHEN METABOLIC PANEL: CPT

## 2017-09-08 PROCEDURE — 80061 LIPID PANEL: CPT

## 2017-09-11 ENCOUNTER — TELEPHONE (OUTPATIENT)
Dept: MEDICAL GROUP | Age: 72
End: 2017-09-11

## 2017-09-11 NOTE — TELEPHONE ENCOUNTER
ESTABLISHED PATIENT PRE-VISIT PLANNING     Note: Patient will not be contacted if there is no indication to call.     1.  Reviewed notes from the last few office visits within the medical group: Yes    2.  If any orders were placed at last visit or intended to be done for this visit (i.e. 6 mos follow-up), do we have Results/Consult Notes?        •  Labs - Labs ordered, completed and results are in chart.       •  Imaging - Imaging was not ordered at last office visit.       •  Referrals - No referrals were ordered at last office visit.    3. Is this appointment scheduled as a Hospital Follow-Up? No    4.  Immunizations were updated in [employer] using [employer]?: Yes       •  Web [employer] Recommendations: FLU, TDAP and ZOSTAVAX (Shingles)    5.  Patient is due for the following Health Maintenance Topics:   Health Maintenance Due   Topic Date Due   • Annual Wellness Visit  1945   • IMM DTaP/Tdap/Td Vaccine (1 - Tdap) 01/12/1966   • COLONOSCOPY  01/12/1997   • IMM ZOSTER VACCINE  01/12/2007   • IMM INFLUENZA (1) 09/01/2017       - Patient has completed PREVNAR (PCV13)  Immunization(s) per WebIZ. Chart has been updated.      6.  Patient was NOT informed to arrive 15 min prior to their scheduled appointment and bring in their medication bottles.

## 2017-09-12 ENCOUNTER — OFFICE VISIT (OUTPATIENT)
Dept: MEDICAL GROUP | Age: 72
End: 2017-09-12
Payer: MEDICARE

## 2017-09-12 VITALS
SYSTOLIC BLOOD PRESSURE: 108 MMHG | OXYGEN SATURATION: 95 % | WEIGHT: 192 LBS | BODY MASS INDEX: 25.45 KG/M2 | DIASTOLIC BLOOD PRESSURE: 66 MMHG | TEMPERATURE: 97.9 F | HEART RATE: 89 BPM | HEIGHT: 73 IN

## 2017-09-12 DIAGNOSIS — N13.8 BPH WITH OBSTRUCTION/LOWER URINARY TRACT SYMPTOMS: ICD-10-CM

## 2017-09-12 DIAGNOSIS — I10 ESSENTIAL HYPERTENSION: ICD-10-CM

## 2017-09-12 DIAGNOSIS — N40.1 BPH WITH OBSTRUCTION/LOWER URINARY TRACT SYMPTOMS: ICD-10-CM

## 2017-09-12 DIAGNOSIS — E78.2 MIXED HYPERLIPIDEMIA: ICD-10-CM

## 2017-09-12 DIAGNOSIS — Z23 NEED FOR VACCINATION: ICD-10-CM

## 2017-09-12 PROCEDURE — G0008 ADMIN INFLUENZA VIRUS VAC: HCPCS | Performed by: INTERNAL MEDICINE

## 2017-09-12 PROCEDURE — 99214 OFFICE O/P EST MOD 30 MIN: CPT | Mod: 25 | Performed by: INTERNAL MEDICINE

## 2017-09-12 PROCEDURE — 90662 IIV NO PRSV INCREASED AG IM: CPT | Performed by: INTERNAL MEDICINE

## 2017-09-12 RX ORDER — CHLORAL HYDRATE 500 MG
CAPSULE ORAL 2 TIMES DAILY
COMMUNITY
End: 2019-11-21

## 2017-09-12 RX ORDER — LOVASTATIN 10 MG/1
10 TABLET ORAL EVERY EVENING
Qty: 90 TAB | Refills: 3 | Status: SHIPPED | OUTPATIENT
Start: 2017-09-12 | End: 2018-08-06 | Stop reason: SDUPTHER

## 2017-09-12 ASSESSMENT — PAIN SCALES - GENERAL: PAINLEVEL: NO PAIN

## 2017-09-12 NOTE — ASSESSMENT & PLAN NOTE
Patient is taking tamsulosin 0.4 mg once a day. He follows with urologist regularly and had digital rectum exam with urologist in January 2017. He denies side effects from taking tamsulosin. His urinary symptoms are well controlled with tamsulosin. Last PSA was 1.63 on 1/7/17.

## 2017-09-12 NOTE — ASSESSMENT & PLAN NOTE
Patient declined to be treated with cholesterol medication in previous visit. He tries to control his cholesterol with diet alone. He has high ASCVD score. His recent lipid panel showed that LDL improved, and triglycerides increased.  Patient agreed to start taking statin treatment. We discussed to try lovastatin 10 mg every evening and reviewed the potential side effect on lovastatin with patient today.    Results for JOHNDELIA (MRN [id number]) as of 9/11/2017 19:19   Ref. Range 6/5/2017 06:30 9/8/2017 06:42   Cholesterol,Tot Latest Ref Range: 100 - 199 mg/dL 188 182   Triglycerides Latest Ref Range: 0 - 149 mg/dL 200 (H) 217 (H)   HDL Latest Ref Range: >=40 mg/dL 45 47   LDL Latest Ref Range: <100 mg/dL 103 (H) 92

## 2017-09-12 NOTE — ASSESSMENT & PLAN NOTE
We discontinued hydrochlorothiazide 12.5 mg daily on 6/12/17 due to elevated creatinine and lowering GFR. He is currently taking losartan 100 mg daily. His kidney function improved with decreasing creatinine to normal range and GFR slightly increased after stopping hydrochlorothiazide.  He denies side effects from taking losartan 100 mg daily.    Results for TRISHADELIA PEREZ (MRN [id number]) as of 9/11/2017 19:19   Ref. Range 1/7/2017 07:15 6/5/2017 06:30 9/8/2017 06:41 9/8/2017 06:42   Bun Latest Ref Range: 8 - 22 mg/dL 20 22  20   Creatinine Latest Ref Range: 0.50 - 1.40 mg/dL 1.39 1.52 (H)  1.39   GFR If  Latest Ref Range: >60 mL/min/1.73 m 2 >60 55 (A)  >60   GFR If Non  Latest Ref Range: >60 mL/min/1.73 m 2 51 (A) 46 (A)  50 (A)

## 2017-09-12 NOTE — PROGRESS NOTES
Subjective:   Delia Mejía is a 72 y.o. male here today for evaluation and management of:      Mixed hyperlipidemia  Patient declined to be treated with cholesterol medication in previous visit. He tries to control his cholesterol with diet alone. He has high ASCVD score. His recent lipid panel showed that LDL improved, and triglycerides increased.  Patient agreed to start taking statin treatment. We discussed to try lovastatin 10 mg every evening and reviewed the potential side effect on lovastatin with patient today.    Results for DELIA MEJÍA (MRN [id number]) as of 9/11/2017 19:19   Ref. Range 6/5/2017 06:30 9/8/2017 06:42   Cholesterol,Tot Latest Ref Range: 100 - 199 mg/dL 188 182   Triglycerides Latest Ref Range: 0 - 149 mg/dL 200 (H) 217 (H)   HDL Latest Ref Range: >=40 mg/dL 45 47   LDL Latest Ref Range: <100 mg/dL 103 (H) 92       Essential hypertension  We discontinued hydrochlorothiazide 12.5 mg daily on 6/12/17 due to elevated creatinine and lowering GFR. He is currently taking losartan 100 mg daily. His kidney function improved with decreasing creatinine to normal range and GFR slightly increased after stopping hydrochlorothiazide.  He denies side effects from taking losartan 100 mg daily.    Results for DELIA MEJÍA (MRN [id number]) as of 9/11/2017 19:19   Ref. Range 1/7/2017 07:15 6/5/2017 06:30 9/8/2017 06:41 9/8/2017 06:42   Bun Latest Ref Range: 8 - 22 mg/dL 20 22  20   Creatinine Latest Ref Range: 0.50 - 1.40 mg/dL 1.39 1.52 (H)  1.39   GFR If  Latest Ref Range: >60 mL/min/1.73 m 2 >60 55 (A)  >60   GFR If Non  Latest Ref Range: >60 mL/min/1.73 m 2 51 (A) 46 (A)  50 (A)       BPH with obstruction/lower urinary tract symptoms  Patient is taking tamsulosin 0.4 mg once a day. He follows with urologist regularly and had digital rectum exam with urologist in January 2017. He denies side effects from taking tamsulosin. His urinary symptoms are well  "controlled with tamsulosin. Last PSA was 1.63 on 1/7/17.         Current medicines (including changes today)  Current Outpatient Prescriptions   Medication Sig Dispense Refill   • Omega-3 1000 MG Cap Take  by mouth 2 Times a Day.     • lovastatin (MEVACOR) 10 MG tablet Take 1 Tab by mouth every evening. 90 Tab 3   • losartan (COZAAR) 100 MG Tab Take 1 Tab by mouth every day. 90 Tab 3   • fluticasone (FLONASE) 50 MCG/ACT nasal spray Spray 2 Sprays in nose every day. 3 Bottle 3   • aspirin (ASA) 325 MG Tab Take 325 mg by mouth every day.     • Multiple Vitamin (MULTI VITAMIN PO) Take 1 Tab by mouth every day.     • Cyanocobalamin (VITAMIN B-12) 5000 MCG SL Tab Place 1 Tab under tongue every day.     • Cholecalciferol (VITAMIN D3) 5000 UNITS Cap Take 1 Cap by mouth every day.     • tamsulosin (FLOMAX) 0.4 MG capsule Take 1 Cap by mouth ONE-HALF HOUR AFTER BREAKFAST. 30 Cap 0     No current facility-administered medications for this visit.      He  has a past medical history of Hyperlipidemia; Hypertension; and Kidney stone.    ROS   No chest pain, no shortness of breath, no abdominal pain       Objective:     Blood pressure 108/66, pulse 89, temperature 36.6 °C (97.9 °F), height 1.854 m (6' 0.98\"), weight 87.1 kg (192 lb), SpO2 95 %. Body mass index is 25.35 kg/m².   Physical Exam:  General: Alert, oriented and no acute distress.  Eye contact is good, speech goal directed, affect calm  HEENT: conjunctiva non-injected, sclera non-icteric.  Oral mucous membranes pink and moist with no lesions.  Pinna normal.   Lungs: Normal respiratory effort, clear to auscultation bilaterally with good excursion.  CV: regular rate and rhythm. No murmurs.   Abdomen: soft, non distended, nontender, Bowel sound normal.  Ext: no edema, color normal, vascularity normal, temperature normal        Assessment and Plan:   The following treatment plan was discussed     1. Mixed hyperlipidemia  - Started on lovastatin 10 mg every evening. Review " the potential side effects of lovastatin with patient. Continue omega-3 twice a day. He will also try coenzyme Q 10.  - Advised to eat low fat, low carbohydrate and high fiber diet as well as do cardio physical exercise regularly.   - Omega-3 1000 MG Cap; Take  by mouth 2 Times a Day.  - lovastatin (MEVACOR) 10 MG tablet; Take 1 Tab by mouth every evening.  Dispense: 90 Tab; Refill: 3  - COMP METABOLIC PANEL; Future  - LIPID PROFILE; Future    2. Essential hypertension  - Well-controlled. Continue current regimens. Recheck lab 1-2 weeks before next follow up visit.  - Recommend to monitor blood pressure and heart rate at home.  - Advised to cut down losartan to 50 mg if his PPD less than 110/60.     3. BPH with obstruction/lower urinary tract symptoms  - Well-controlled. Continue tamsulosin 0.4 mg daily. He has follow-up appointment with urologist once a year.     4. Need for vaccination  - Influenza vaccine was given today after reviewing risks and benefits as well as side effects of vaccine.  - INFLUENZA VACCINE, HIGH DOSE (65+ ONLY)    5. Health Maintenance   - Patient has appointment with GI from [facility] for colon cancer screening in November 2017. He also follows with dermatologist in November 2017 for skin cancer screening.     Followup: Return in about 3 months (around 12/12/2017), or if symptoms worsen or fail to improve, for hypertension, BPH, hyperlipidemia, lab review.      Please note that this dictation was created using voice recognition software. I have made every reasonable attempt to correct obvious errors, but I expect that there may have unintended errors in text, spelling, punctuation, or grammar that I did not discover.

## 2017-12-03 ENCOUNTER — OFFICE VISIT (OUTPATIENT)
Dept: URGENT CARE | Facility: CLINIC | Age: 72
End: 2017-12-03
Payer: MEDICARE

## 2017-12-03 VITALS
OXYGEN SATURATION: 98 % | HEIGHT: 71 IN | DIASTOLIC BLOOD PRESSURE: 78 MMHG | WEIGHT: 195.2 LBS | HEART RATE: 70 BPM | BODY MASS INDEX: 27.33 KG/M2 | RESPIRATION RATE: 16 BRPM | TEMPERATURE: 97.6 F | SYSTOLIC BLOOD PRESSURE: 142 MMHG

## 2017-12-03 DIAGNOSIS — J01.90 ACUTE NON-RECURRENT SINUSITIS, UNSPECIFIED LOCATION: ICD-10-CM

## 2017-12-03 PROCEDURE — 99203 OFFICE O/P NEW LOW 30 MIN: CPT | Performed by: PHYSICIAN ASSISTANT

## 2017-12-03 RX ORDER — AMOXICILLIN AND CLAVULANATE POTASSIUM 875; 125 MG/1; MG/1
TABLET, FILM COATED ORAL
Qty: 14 TAB | Refills: 0 | Status: SHIPPED | OUTPATIENT
Start: 2017-12-03 | End: 2018-01-23

## 2017-12-03 ASSESSMENT — ENCOUNTER SYMPTOMS
SWOLLEN GLANDS: 1
COUGH: 0
HOARSE VOICE: 1
SORE THROAT: 1
HEADACHES: 1
SINUS PRESSURE: 1

## 2017-12-03 NOTE — PROGRESS NOTES
"Subjective:      Franklin Mejía is a 72 y.o. male who presents with Sinus Problem (x2weeks, sinus pressure mostly in right side of face, dark green mucus, headache)            Sinus Problem   The current episode started in the past 7 days. The problem has been gradually worsening since onset. There has been no fever. The fever has been present for less than 1 day. His pain is at a severity of 4/10. The pain is mild. Associated symptoms include congestion, headaches, a hoarse voice, sinus pressure, a sore throat and swollen glands. Pertinent negatives include no coughing. Past treatments include oral decongestants. The treatment provided mild relief.     Past medical history: Is not pertinent to this examination  Past surgical history: Not pertinent to this examination  Family history: Is not pertinent to this examination  Allergies: No known drug allergies  Social history: Is reviewed in Epic      Review of Systems   HENT: Positive for congestion, hoarse voice, sinus pressure and sore throat.    Respiratory: Negative for cough.    Neurological: Positive for headaches.          Objective:     /78   Pulse 70   Temp 36.4 °C (97.6 °F)   Resp 16   Ht 1.803 m (5' 11\")   Wt 88.5 kg (195 lb 3.2 oz)   SpO2 98%   BMI 27.22 kg/m²      Physical Exam       Gen.: Patient is A and O ×3, no acute distress, well-nourished well-hydrated  Vitals: Are listed and unremarkable  HEENT: Heads normocephalic, atraumatic, PERRLA, extraocular movements intact, TMs and oropharynx clear. Patient has mild pain to percussion in the maxillary and frontal sinus region.  Neck: Soft supple without cervical lymphadenopathy  Cardiovascular: Regular rate and rhythm normal S1 and S2. No murmurs, rubs or gallops  Lungs are clear to auscultation bilaterally. no wheezes rales or rhonchi  Abdomen is soft, nontender, nondistended with good bowel sounds, no hepatosplenomegaly  Skin: Is well perfused without evidence of rash or " lesions  Neurological:  cranial nerves II through XII were assessed and intact.  Musculoskeletal: Full range of motion, 5 out of 5 strength against resistance  Neurovascularly: Intact with a 2 second cap refill, good distal pulses       Assessment/Plan:     1. Acute non-recurrent sinusitis, unspecified location  Discussed likely viral etiology. Patient requesting antibiotics as he is leaving on an airplane in 5-6 days. Discussed supportive care measures with antihistamine decongestant. Only fill prescription if symptoms are persisting or worsening  - amoxicillin-clavulanate (AUGMENTIN) 875-125 MG Tab; Take one pill twice a day with food for a week  Dispense: 14 Tab; Refill: 0

## 2018-01-15 ENCOUNTER — TELEPHONE (OUTPATIENT)
Dept: MEDICAL GROUP | Age: 73
End: 2018-01-15

## 2018-01-15 NOTE — TELEPHONE ENCOUNTER
Future Appointments       Provider Department Center    1/18/2018 6:15 AM [facility]     1/23/2018 2:00 PM Kelli Sylvester M.D.; [facility] [address]          Left message for patient to call back regarding pre-visit planning. Please transfer call to [phone].

## 2018-01-17 NOTE — TELEPHONE ENCOUNTER
ANNUAL WELLNESS VISIT PRE-VISIT PLANNING     1.  Reviewed note from last office visit with PCP: YES    2.  If any orders were placed at last visit, do we have Results/Consult Notes?        •  Labs - Labs ordered, completed on not done and results are in chart.   Note: If patient appointment is for lab review and patient did not complete labs, check with provider if OK to reschedule patient until labs completed.       •  Imaging - Imaging was not ordered at last office visit.       •  Referrals - No referrals were ordered at last office visit.    3.  Immunizations were updated in Epic using WebIZ?: Epic matches WebIZ       •  WebIZ Recommendations: TDAP and ZOSTAVAX (Shingles)       •  Is patient due for Tdap? YES. Patient was notified of copay/out of pocket cost.       •  Is patient due for Shingles? YES. Patient was notified of copay/out of pocket cost.     4.  Patient is due for the following Health Maintenance Topics:   Health Maintenance Due   Topic Date Due   • Annual Wellness Visit  1945   • IMM DTaP/Tdap/Td Vaccine (1 - Tdap) 01/12/1966   • IMM ZOSTER VACCINE  01/12/2007       - Patient is up-to-date on all Health Maintenance topics. No records have been requested at this time.    5.  Reviewed/Updated the following with patient:       •   Preferred Pharmacy? YES       •   Preferred Lab? YES       •   Preferred Communication? YES       •   Allergies? YES       •   Medications? YES. Was Abstract Encounter opened and chart updated? YES       •   Social History? YES. Was Abstract Encounter opened and chart updated? YES       •   Family History (document living status of immediate family members and if + hx of cancer, diabetes, hypertension, hyperlipidemia, heart attack, stroke) YES. Was Abstract Encounter opened and chart updated? YES    6.  Care Team Updated:       •   DME Company (gait device, O2, CPAP, etc.): YES       •   Other Specialists (eye doctor, derm, GYN, cardiology, endo, etc): YES    7.   Patient has the following Care Path diagnoses on Problem List:  NONE    8.  Specialty Comments was updated with diagnosis information provided by SCP: no comments    9.  Patient was advised: “This is a free wellness visit. The provider will screen for medical conditions to help you stay healthy. If you have other concerns to address you may be asked to discuss these at a separate visit or there may be an additional fee.”     6.  Patient was informed to arrive 15 min prior to their scheduled appointment and bring in their medication bottles.

## 2018-01-17 NOTE — TELEPHONE ENCOUNTER
Future Appointments       Provider Department Center    1/18/2018 6:15 AM [facility]     1/23/2018 2:00 PM Kelli Sylvester M.D.; [facility] [address]          Left message for patient to call back regarding pre-visit planning. Please transfer call to [phone].

## 2018-01-18 ENCOUNTER — HOSPITAL ENCOUNTER (OUTPATIENT)
Dept: LAB | Facility: MEDICAL CENTER | Age: 73
End: 2018-01-18
Attending: INTERNAL MEDICINE
Payer: MEDICARE

## 2018-01-18 DIAGNOSIS — E78.2 MIXED HYPERLIPIDEMIA: ICD-10-CM

## 2018-01-18 LAB
ALBUMIN SERPL BCP-MCNC: 3.8 G/DL (ref 3.2–4.9)
ALBUMIN/GLOB SERPL: 1.3 G/DL
ALP SERPL-CCNC: 53 U/L (ref 30–99)
ALT SERPL-CCNC: 25 U/L (ref 2–50)
ANION GAP SERPL CALC-SCNC: 10 MMOL/L (ref 0–11.9)
AST SERPL-CCNC: 21 U/L (ref 12–45)
BILIRUB SERPL-MCNC: 0.7 MG/DL (ref 0.1–1.5)
BUN SERPL-MCNC: 20 MG/DL (ref 8–22)
CALCIUM SERPL-MCNC: 8.9 MG/DL (ref 8.5–10.5)
CHLORIDE SERPL-SCNC: 109 MMOL/L (ref 96–112)
CHOLEST SERPL-MCNC: 144 MG/DL (ref 100–199)
CO2 SERPL-SCNC: 23 MMOL/L (ref 20–33)
CREAT SERPL-MCNC: 1.21 MG/DL (ref 0.5–1.4)
GLOBULIN SER CALC-MCNC: 3 G/DL (ref 1.9–3.5)
GLUCOSE SERPL-MCNC: 82 MG/DL (ref 65–99)
HDLC SERPL-MCNC: 38 MG/DL
LDLC SERPL CALC-MCNC: 68 MG/DL
POTASSIUM SERPL-SCNC: 4.4 MMOL/L (ref 3.6–5.5)
PROT SERPL-MCNC: 6.8 G/DL (ref 6–8.2)
SODIUM SERPL-SCNC: 142 MMOL/L (ref 135–145)
TRIGL SERPL-MCNC: 190 MG/DL (ref 0–149)

## 2018-01-18 PROCEDURE — 80061 LIPID PANEL: CPT

## 2018-01-18 PROCEDURE — 36415 COLL VENOUS BLD VENIPUNCTURE: CPT

## 2018-01-18 PROCEDURE — 80053 COMPREHEN METABOLIC PANEL: CPT

## 2018-01-22 PROBLEM — J30.89 CHRONIC NONSEASONAL ALLERGIC RHINITIS DUE TO POLLEN: Status: ACTIVE | Noted: 2017-02-17

## 2018-01-23 ENCOUNTER — OFFICE VISIT (OUTPATIENT)
Dept: MEDICAL GROUP | Age: 73
End: 2018-01-23
Payer: MEDICARE

## 2018-01-23 VITALS
HEART RATE: 85 BPM | WEIGHT: 199 LBS | DIASTOLIC BLOOD PRESSURE: 66 MMHG | TEMPERATURE: 98.2 F | OXYGEN SATURATION: 96 % | SYSTOLIC BLOOD PRESSURE: 114 MMHG | BODY MASS INDEX: 27.86 KG/M2 | HEIGHT: 71 IN

## 2018-01-23 DIAGNOSIS — N20.0 KIDNEY CALCULUS: ICD-10-CM

## 2018-01-23 DIAGNOSIS — I10 ESSENTIAL HYPERTENSION: ICD-10-CM

## 2018-01-23 DIAGNOSIS — N40.1 BPH WITH OBSTRUCTION/LOWER URINARY TRACT SYMPTOMS: ICD-10-CM

## 2018-01-23 DIAGNOSIS — Z23 NEED FOR VACCINATION: ICD-10-CM

## 2018-01-23 DIAGNOSIS — J30.89 CHRONIC NONSEASONAL ALLERGIC RHINITIS DUE TO POLLEN: ICD-10-CM

## 2018-01-23 DIAGNOSIS — E78.2 MIXED HYPERLIPIDEMIA: ICD-10-CM

## 2018-01-23 DIAGNOSIS — N13.8 BPH WITH OBSTRUCTION/LOWER URINARY TRACT SYMPTOMS: ICD-10-CM

## 2018-01-23 DIAGNOSIS — Z00.00 MEDICARE ANNUAL WELLNESS VISIT, INITIAL: ICD-10-CM

## 2018-01-23 PROCEDURE — 90471 IMMUNIZATION ADMIN: CPT | Performed by: INTERNAL MEDICINE

## 2018-01-23 PROCEDURE — 90715 TDAP VACCINE 7 YRS/> IM: CPT | Performed by: INTERNAL MEDICINE

## 2018-01-23 PROCEDURE — G0439 PPPS, SUBSEQ VISIT: HCPCS | Mod: 25 | Performed by: INTERNAL MEDICINE

## 2018-01-23 ASSESSMENT — PATIENT HEALTH QUESTIONNAIRE - PHQ9: CLINICAL INTERPRETATION OF PHQ2 SCORE: 0

## 2018-01-23 ASSESSMENT — PAIN SCALES - GENERAL: PAINLEVEL: NO PAIN

## 2018-01-23 NOTE — PROGRESS NOTES
Chief Complaint   Patient presents with   • Annual Wellness Visit     SCP         HPI:  Franklin is a 73 y.o. here for Medicare Annual Wellness Visit        Patient Active Problem List    Diagnosis Date Noted   • Essential hypertension 02/17/2017   • Chronic nonseasonal allergic rhinitis due to pollen 02/17/2017   • BPH with obstruction/lower urinary tract symptoms 02/17/2017   • Mixed hyperlipidemia 02/17/2017   • Kidney calculus 01/16/2017       Current Outpatient Prescriptions   Medication Sig Dispense Refill   • Omega-3 1000 MG Cap Take  by mouth 2 Times a Day.     • lovastatin (MEVACOR) 10 MG tablet Take 1 Tab by mouth every evening. 90 Tab 3   • losartan (COZAAR) 100 MG Tab Take 1 Tab by mouth every day. 90 Tab 3   • fluticasone (FLONASE) 50 MCG/ACT nasal spray Spray 2 Sprays in nose every day. 3 Bottle 3   • aspirin (ASA) 325 MG Tab Take 325 mg by mouth every day.     • Multiple Vitamin (MULTI VITAMIN PO) Take 1 Tab by mouth every day.     • Cyanocobalamin (VITAMIN B-12) 5000 MCG SL Tab Place 1 Tab under tongue every day.     • Cholecalciferol (VITAMIN D3) 5000 UNITS Cap Take 1 Cap by mouth every day.     • tamsulosin (FLOMAX) 0.4 MG capsule Take 1 Cap by mouth ONE-HALF HOUR AFTER BREAKFAST. 30 Cap 0     No current facility-administered medications for this visit.         Patient is taking medications as noted in medication list.  Current supplements as per medication list.     Allergies: Prilosec [omeprazole]    Current social contact/activities: golf/wine with friends/Orth[employer]/food bank     Is patient current with immunizations? No, due for TDAP and ZOSTAVAX (Shingles). Patient is interested in receiving TDAP today.    He  reports that he quit smoking about 33 years ago. His smoking use included Cigarettes. He has a 15.00 pack-year smoking history. He has never used smokeless tobacco. He reports that he drinks about 1.2 oz of alcohol per week . He reports that he does not use drugs.  Counseling given:  Yes        DPA/Advanced directive: Patient has Advanced Directive, but it is not on file. Instructed to bring in a copy to scan into their chart.    ROS:    Gait: Uses no assistive device   Ostomy: no   Other tubes: no   Amputations: no   Chronic oxygen use no   Last eye exam 2013   Wears hearing aids: yes   : Denies any urinary leakage during the last 6 months      Screening:        Depression Screening    Little interest or pleasure in doing things?  0 - not at all  Feeling down, depressed, or hopeless? 0 - not at all  Patient Health Questionnaire Score: 0    If depressive symptoms identified deferred to follow up visit unless specifically addressed in assessment and plan.    Interpretation of PHQ-9 Total Score   Score Severity   1-4 No Depression   5-9 Mild Depression   10-14 Moderate Depression   15-19 Moderately Severe Depression   20-27 Severe Depression    Screening for Cognitive Impairment    Three Minute Recall (apple, watch, avery)  3/3 Banana fence sunrise  3/3  Draw clock face with all 12 numbers set to the hand to show 10 minutes past 11 o'clock  1 5/5  If cognitive concerns identified, deferred for follow up unless specifically addressed in assessment and plan.    Fall Risk Assessment    Has the patient had two or more falls in the last year or any fall with injury in the last year?  No  If fall risk identified, deferred for follow up unless specifically addressed in assessment and plan.    Safety Assessment    Throw rugs on floor.  Yes  Handrails on all stairs.  Yes  Good lighting in all hallways.  Yes  Difficulty hearing.  No  Patient counseled about all safety risks that were identified.    Functional Assessment ADLs    Are there any barriers preventing you from cooking for yourself or meeting nutritional needs?  No.    Are there any barriers preventing you from driving safely or obtaining transportation?  No.    Are there any barriers preventing you from using a telephone or calling for help?   No.    Are there any barriers preventing you from shopping?  No.    Are there any barriers preventing you from taking care of your own finances?  No.    Are there any barriers preventing you from managing your medications?  No.    Are you currently engaging any exercise or physical activity?  Yes.  Ski/gym 3 to 4 times a week    Health Maintenance Summary                Annual Wellness Visit Overdue 1945     IMM DTaP/Tdap/Td Vaccine Overdue 1/12/1966     IMM ZOSTER VACCINE Overdue 1/12/2007     IMM PNEUMOCOCCAL 65+ (ADULT) LOW/MEDIUM RISK SERIES Next Due 6/12/2018      Done 6/12/2017 Imm Admin: Pneumococcal Conjugate Vaccine (Prevnar/PCV-13)    COLONOSCOPY Next Due 11/21/2020      Done 11/21/2017 REFERRAL TO GI FOR COLONOSCOPY          Patient Care Team:  Kelli Sylvester M.D. as PCP - General (Internal Medicine)  Emil Addison M.D. as Consulting Physician (Urology)  Javier Lopez O.D. as Consulting Physician (Optometry)  [facility] as Consulting Physician (Gastroenterology)  Thom Garcia M.D. as Consulting Physician (Gastroenterology)    Social History   Substance Use Topics   • Smoking status: Former Smoker     Packs/day: 1.00     Years: 15.00     Types: Cigarettes     Quit date: 1/1/1985   • Smokeless tobacco: Never Used   • Alcohol use 1.2 oz/week     2 Shots of liquor per week      Comment: 1 per day     Family History   Problem Relation Age of Onset   • Cancer Mother      colon cancer   • Heart Disease Brother    • No Known Problems Maternal Grandmother    • Hypertension Maternal Grandfather      He  has a past medical history of Hyperlipidemia; Hypertension; and Kidney stone.   Past Surgical History:   Procedure Laterality Date   • URETEROSCOPY Left 1/16/2017    Procedure: URETEROSCOPY;  Surgeon: Emil Addison M.D.;  Location: SURGERY [facility];  Service:    • LASERTRIPSY  1/16/2017    Procedure: LASERTRIPSY - LITHO;  Surgeon: Emil Addison M.D.;  Location: SURGERY [facility]  "TOWER ORS;  Service:    • OTHER ORTHOPEDIC SURGERY  2011    left knee meniscus           Exam:     Blood pressure 114/66, pulse 85, temperature 36.8 °C (98.2 °F), height 1.791 m (5' 10.5\"), weight 90.3 kg (199 lb), SpO2 96 %. Body mass index is 28.15 kg/m².    Hearing good.  He is wearing hearing aids bilaterally.   Dentition good  Alert, oriented in no acute distress.  Eye contact is good, speech goal directed, affect calm      Assessment and Plan. The following treatment and monitoring plan is recommended:    1. Medicare annual wellness visit, initial  Initial Wellness Visit - Includes PPPS ()   2. Mixed hyperlipidemia  COMP METABOLIC PANEL    LIPID PROFILE    Initial Wellness Visit - Includes PPPS ()    Well-controlled. LDL less than 100. Triglycerides improved, but remains high. Continue lovastatin 10 mg every evening. Discussed healthy diet and regular exercise.    3. Essential hypertension  CBC WITH DIFFERENTIAL    COMP METABOLIC PANEL    Initial Wellness Visit - Includes PPPS ()    Well-controlled. Continue losartan 100 mg daily and aspirin 325 mg daily. Recommend to monitor blood pressure and heart rate at home.   4. Chronic nonseasonal allergic rhinitis due to pollen  CBC WITH DIFFERENTIAL    COMP METABOLIC PANEL    Initial Wellness Visit - Includes PPPS ()    Well-controlled. Advised to rinse sinuses and use Flonase nasal spray as needed.   5. Kidney calculus  Initial Wellness Visit - Includes PPPS ()    Stable. Asymptomatic . No recurrent stones recently. Advised to keep well hydrated.   6. BPH with obstruction/lower urinary tract symptoms  Initial Wellness Visit - Includes PPPS ()    Well-controlled. Continue Flomax 0.4 mg once a day.   7. Need for vaccination   Tdap vaccine was given today after reviewing risks and benefits as well as side effects of vaccine.   TDAP VACCINE =>6YO IM    Initial Wellness Visit - Includes PPPS ()         Services suggested: No services " needed at this time  Health Care Screening recommendations as per orders if indicated.  Referrals offered: PT/OT/Nutrition counseling/Behavioral Health/Smoking cessation as per orders if indicated.    Discussion today about general wellness and lifestyle habits:    · Prevent falls and reduce trip hazards; Cautioned about securing or removing rugs.  · Have a working fire alarm and carbon monoxide detector;   · Engage in regular physical activity and social activities       Follow-up: Return in about 6 months (around 7/23/2018), or if symptoms worsen or fail to improve, for hypertension, hyperlipidemia, impaired kidney function, lab review.

## 2018-02-05 ENCOUNTER — HOSPITAL ENCOUNTER (OUTPATIENT)
Dept: RADIOLOGY | Facility: MEDICAL CENTER | Age: 73
End: 2018-02-05
Attending: UROLOGY
Payer: MEDICARE

## 2018-02-05 DIAGNOSIS — N20.0 URIC ACID NEPHROLITHIASIS: ICD-10-CM

## 2018-02-05 PROCEDURE — 74018 RADEX ABDOMEN 1 VIEW: CPT

## 2018-02-27 DIAGNOSIS — J30.9 CHRONIC ALLERGIC RHINITIS: ICD-10-CM

## 2018-02-27 RX ORDER — FLUTICASONE PROPIONATE 50 MCG
SPRAY, SUSPENSION (ML) NASAL
Qty: 1 BOTTLE | Refills: 6 | Status: SHIPPED | OUTPATIENT
Start: 2018-02-27 | End: 2019-04-02 | Stop reason: SDUPTHER

## 2018-03-01 ENCOUNTER — TELEPHONE (OUTPATIENT)
Dept: MEDICAL GROUP | Age: 73
End: 2018-03-01

## 2018-03-02 NOTE — TELEPHONE ENCOUNTER
Please inform pharmacy that it is okay to dispense Flonase 16 g.    Thanks!  Kelli Sylvester M.D.

## 2018-03-02 NOTE — TELEPHONE ENCOUNTER
VOICEMAIL  1. Caller Name: Hua from [facility]                      Call Back Number: [phone]    2. Message: Flonase dispense quantity?   Ok to put 3 units?    3. Patient approves office to leave a detailed voicemail/MyChart message: yes

## 2018-03-05 ENCOUNTER — TELEPHONE (OUTPATIENT)
Dept: MEDICAL GROUP | Age: 73
End: 2018-03-05

## 2018-03-13 ENCOUNTER — PATIENT OUTREACH (OUTPATIENT)
Dept: HEALTH INFORMATION MANAGEMENT | Facility: OTHER | Age: 73
End: 2018-03-13

## 2018-05-19 DIAGNOSIS — I10 ESSENTIAL HYPERTENSION: ICD-10-CM

## 2018-05-21 RX ORDER — LOSARTAN POTASSIUM 100 MG/1
TABLET ORAL
Qty: 90 TAB | Refills: 3 | Status: SHIPPED | OUTPATIENT
Start: 2018-05-21 | End: 2019-04-05 | Stop reason: SDUPTHER

## 2018-08-06 DIAGNOSIS — E78.2 MIXED HYPERLIPIDEMIA: ICD-10-CM

## 2018-08-06 RX ORDER — LOVASTATIN 10 MG/1
10 TABLET ORAL EVERY EVENING
Qty: 90 TAB | Refills: 3 | Status: SHIPPED | OUTPATIENT
Start: 2018-08-06 | End: 2019-04-05 | Stop reason: SDUPTHER

## 2018-08-08 ENCOUNTER — TELEPHONE (OUTPATIENT)
Dept: MEDICAL GROUP | Age: 73
End: 2018-08-08

## 2018-08-08 NOTE — TELEPHONE ENCOUNTER
Spoke with patient to confirm he would like medication sent over to [facility]  mail order pharmacy. Confirmed with patient that he would have enough medication to last until arrival from mail order, patient advised he would have enough to last.    Contacted [facility] an spoke with Suellen to cancel RX on file-switched to [facility] mailorder. Called in new RX to [facility] same directions for [facility]

## 2018-08-08 NOTE — TELEPHONE ENCOUNTER
Phone Number Called: [phone] (home)        Message: LVM for patient to contact office to verify which pharmacy would like medication for lovastatin sent to, received request for [facility]. RX has already been sent to [facility] pharmacy.       Left Message for patient to call back: yes

## 2018-08-13 ENCOUNTER — TELEPHONE (OUTPATIENT)
Dept: MEDICAL GROUP | Age: 73
End: 2018-08-13

## 2018-08-13 NOTE — TELEPHONE ENCOUNTER
Future Appointments       Provider Department Center    8/14/2018 3:00 PM Kelli Sylvester M.D. [place] [address]    9/19/2018 1:20 PM Kelli Sylvester M.D. [place] [address]        ESTABLISHED PATIENT PRE-VISIT PLANNING     Note: Patient will not be contacted if there is no indication to call.     1.  Reviewed notes from the last few office visits within the medical group: Yes    2.  If any orders were placed at last visit or intended to be done for this visit (i.e. 6 mos follow-up), do we have Results/Consult Notes?        •  Labs - Labs ordered, but not to be completed until 9/19/18.   Note: If patient appointment is for lab review and patient did not complete labs, check with provider if OK to reschedule patient until labs completed.       •  Imaging - Imaging was not ordered at last office visit.       •  Referrals - No referrals were ordered at last office visit.    3. Is this appointment scheduled as a Hospital Follow-Up? No    4.  Immunizations were updated in Epic using WebIZ?: Epic matches WebIZ       •  Web Iz Recommendations: PNEUMOVAX (PPSV23)    5.  Patient is due for the following Health Maintenance Topics:   Health Maintenance Due   Topic Date Due   • IMM ZOSTER VACCINES (1 of 2) 01/12/1997   • IMM PNEUMOCOCCAL 65+ (ADULT) LOW/MEDIUM RISK SERIES (2 of 2 - PPSV23) 06/12/2018       - Patient is up-to-date on all Health Maintenance topics. No records have been requested at this time.    6.  MDX printed for Provider? NO  MDX complete      7.  Patient was NOT informed to arrive 15 min prior to their scheduled appointment and bring in their medication bottles.

## 2018-08-14 ENCOUNTER — OFFICE VISIT (OUTPATIENT)
Dept: MEDICAL GROUP | Age: 73
End: 2018-08-14
Payer: MEDICARE

## 2018-08-14 VITALS
OXYGEN SATURATION: 93 % | BODY MASS INDEX: 27.64 KG/M2 | HEART RATE: 97 BPM | HEIGHT: 71 IN | WEIGHT: 197.4 LBS | DIASTOLIC BLOOD PRESSURE: 76 MMHG | TEMPERATURE: 98.6 F | SYSTOLIC BLOOD PRESSURE: 138 MMHG

## 2018-08-14 DIAGNOSIS — E78.2 MIXED HYPERLIPIDEMIA: ICD-10-CM

## 2018-08-14 DIAGNOSIS — H66.93 BACTERIAL INFECTION OF BOTH EARS: ICD-10-CM

## 2018-08-14 DIAGNOSIS — I10 ESSENTIAL HYPERTENSION: ICD-10-CM

## 2018-08-14 DIAGNOSIS — B96.89 BACTERIAL INFECTION OF BOTH EARS: ICD-10-CM

## 2018-08-14 DIAGNOSIS — H61.23 BILATERAL IMPACTED CERUMEN: ICD-10-CM

## 2018-08-14 DIAGNOSIS — J30.89 CHRONIC NONSEASONAL ALLERGIC RHINITIS DUE TO POLLEN: ICD-10-CM

## 2018-08-14 PROCEDURE — 99214 OFFICE O/P EST MOD 30 MIN: CPT | Performed by: INTERNAL MEDICINE

## 2018-08-14 RX ORDER — AMOXICILLIN 875 MG/1
875 TABLET, COATED ORAL 2 TIMES DAILY
Qty: 14 TAB | Refills: 0 | Status: SHIPPED | OUTPATIENT
Start: 2018-08-14 | End: 2018-08-21

## 2018-08-14 RX ORDER — AZELASTINE 1 MG/ML
1 SPRAY, METERED NASAL 2 TIMES DAILY
Qty: 30 ML | Refills: 3 | Status: SHIPPED | OUTPATIENT
Start: 2018-08-14 | End: 2019-11-21 | Stop reason: SDUPTHER

## 2018-08-14 NOTE — ASSESSMENT & PLAN NOTE
Patient has chronic allergic rhinitis with runny nose, sinus congestion, and intermittent nosebleed. He states that his symptoms are worsening 3 weeks ago.  He takes cetirizine 10 mg daily.  He also rinse his sinus daily.  He used Flonase nasal spray for sinus congestion but did not feel improvement.  So he uses nasal spray Afrin daily for 3 weeks.  He did not feel improvement from using Afrin.  He denied fever or chills or coughing or wheezing or shortness of breath.

## 2018-08-14 NOTE — ASSESSMENT & PLAN NOTE
Patient is taking losartan 100 mg daily his blood pressure is stable with current regimens.  He denies side effects from taking losartan.  We discontinued hydrochlorothiazide 12.5 mg daily on 6/12/17 to 2 elevated creatinine and GFR.  His kidney function improved after stopped taking hydrochlorothiazide.  He will have blood work for next month  before follow-up visit in next month.

## 2018-08-14 NOTE — PROGRESS NOTES
Subjective:   Franklin Mejía is a 73 y.o. male here today for evaluation and management of:      Chronic nonseasonal allergic rhinitis due to pollen  Patient has chronic allergic rhinitis with runny nose, sinus congestion, and intermittent nosebleed. He states that his symptoms are worsening 3 weeks ago.  He takes cetirizine 10 mg daily.  He also rinse his sinus daily.  He used Flonase nasal spray for sinus congestion but did not feel improvement.  So he uses nasal spray Afrin daily for 3 weeks.  He did not feel improvement from using Afrin.  He denied fever or chills or coughing or wheezing or shortness of breath.    Bilateral impacted cerumen  This is a new problem.  Patient reported that he feels fullness on both sides of the ears for 4 weeks.  He noticed left ear is worse than the right side.  He wears hearing aids bilaterally.  He reported that he used to build up ear wax easily.  He denied ear pain or decreased hearing.       Essential hypertension  Patient is taking losartan 100 mg daily his blood pressure is stable with current regimens.  He denies side effects from taking losartan.  We discontinued hydrochlorothiazide 12.5 mg daily on 6/12/17 to 2 elevated creatinine and GFR.  His kidney function improved after stopped taking hydrochlorothiazide.  He will have blood work for next month  before follow-up visit in next month.    Mixed hyperlipidemia  Patient is taking lovastatin 10 mg every evening.  He tolerates medication without side effects.  His cholesterol is stable and well controlled.         Current medicines (including changes today)  Current Outpatient Prescriptions   Medication Sig Dispense Refill   • Cetirizine HCl (ZYRTEC ALLERGY PO) Take  by mouth.     • azelastine (ASTELIN) 137 MCG/SPRAY nasal spray [place] 1 Spray in nose 2 times a day. 30 mL 3   • lovastatin (MEVACOR) 10 MG tablet Take 1 Tab by mouth every evening. 90 Tab 3   • losartan (COZAAR) 100 MG Tab TAKE 1 TABLET BY MOUTH EVERY  "DAY (DISCONTINUE LOSARTAN-HCTZ) 90 Tab 3   • fluticasone (FLONASE) 50 MCG/ACT nasal spray USE 2 SPRAYS IN NOSE EVERY DAY 1 Bottle 6   • Omega-3 1000 MG Cap Take  by mouth 2 Times a Day.     • aspirin (ASA) 325 MG Tab Take 325 mg by mouth every day.     • Multiple Vitamin (MULTI VITAMIN PO) Take 1 Tab by mouth every day.     • Cyanocobalamin (VITAMIN B-12) 5000 MCG SL Tab Place 1 Tab under tongue every day.     • Cholecalciferol (VITAMIN D3) 5000 UNITS Cap Take 1 Cap by mouth every day.     • tamsulosin (FLOMAX) 0.4 MG capsule Take 1 Cap by mouth ONE-HALF HOUR AFTER BREAKFAST. 30 Cap 0     No current facility-administered medications for this visit.      He  has a past medical history of Hyperlipidemia; Hypertension; and Kidney stone.    ROS   No chest pain, no shortness of breath, no abdominal pain       Objective:     Blood pressure 138/76, pulse 97, temperature 37 °C (98.6 °F), height 1.791 m (5' 10.5\"), weight 89.5 kg (197 lb 6.4 oz), SpO2 93 %. Body mass index is 27.92 kg/m².   Physical Exam:  General: Alert, oriented and no acute distress.  Eye contact is good, speech goal directed, affect calm  HEENT: conjunctiva non-injected, sclera non-icteric.  Erythema and swelling on both nasal turbinates with watery discharge.  Oral mucous membranes pink and moist with no lesions.  Pinna normal. Bilateral cerumen impaction.  Mild erythema and tender in bilateral ear can now after ear lavage.  No purulent discharge.  Neck No supraclavicular, submandibular, submental lymphadenopathy or masses in the neck or supraclavicular regions.  Lungs: Normal respiratory effort, clear to auscultation bilaterally with good excursion.  CV: regular rate and rhythm. No murmurs.   Abdomen: soft, non distended, nontender, bowel sound normal.  Ext: no edema, color normal, vascularity normal, temperature normal        Assessment and Plan:   The following treatment plan was discussed     1. Chronic nonseasonal allergic rhinitis due to pollen  - " Recommend to stop using Afrin nasal spray.  Prescribed Astelin nasal spray to use 1 spray in each sides of the nose for twice a day for 3 days.  He can continue using Flonase nasal spray together.  - Discussed to take Claritin 10 mg daily for 2 weeks.  Advised to increase water intake.  - Discussed at length to rinse sinuses with over the counter nasal wash or Netti pot once or twice a day and then use flonase nasal spray once or twice a day after rinsing sinuses  - Advised to try nasal steam therapy.  Barium   - azelastine (ASTELIN) 137 MCG/SPRAY nasal spray; Spray 1 Spray in nose 2 times a day.  Dispense: 30 mL; Refill: 3    2. Bilateral impacted cerumen  - irrigated both ears with saline in clinic.  Patient tolerates procedure.  Patient satisfied with the outcome.    3. Essential hypertension  - Well-controlled. Continue current regimens. Recheck lab 1-2 weeks before next follow up visit.  - Reviewed the risks and benefits as well as potential side effects of medications with patient.  - Discussed to eat low-sodium diet and encouraged to do regular physical exercise.  - Recommend to monitor blood pressure and heart rate at home.    4. Mixed hyperlipidemia  - Well-controlled. Continue current regimens. Recheck lab 1-2 weeks before next follow up visit.  - Reviewed the risks and benefits of treatment and potential side effects of medication.  - Advised to eat low fat, low carbohydrate and high fiber diet as well as do cardio physical exercise regularly.    5. Bacterial infection of both ears  - Patient feelings better in the fullness of the ears after ear lavage.  But he has erythema and tender inside the ear canal.  I prescribed amoxicillin 875 mg 1 tablets twice a day for 7 days to cover possible infection.  Patient is advised to take it with probiotic 1 capsule daily.  I reviewed potential side effects of amoxicillin with patient.  Patient is advised to not to irritate years with Q-tips or any eardrops.  Patient  is advised to return to clinic if symptoms do not improve.  Patient understands and agrees with plan.    Followup: Return in about 4 weeks (around 9/11/2018), or if symptoms worsen or fail to improve, for Hypertension, Hyperlipidemia, Lab review.      Please note that this dictation was created using voice recognition software. I have made every reasonable attempt to correct obvious errors, but I expect that there may have unintended errors in text, spelling, punctuation, or grammar that I did not discover.

## 2018-08-14 NOTE — ASSESSMENT & PLAN NOTE
Patient is taking lovastatin 10 mg every evening.  He tolerates medication without side effects.  His cholesterol is stable and well controlled.

## 2018-08-14 NOTE — ASSESSMENT & PLAN NOTE
This is a new problem.  Patient reported that he feels fullness on both sides of the ears for 4 weeks.  He noticed left ear is worse than the right side.  He wears hearing aids bilaterally.  He reported that he used to build up ear wax easily.  He denied ear pain or decreased hearing.

## 2018-09-14 ENCOUNTER — HOSPITAL ENCOUNTER (OUTPATIENT)
Dept: LAB | Facility: MEDICAL CENTER | Age: 73
End: 2018-09-14
Attending: INTERNAL MEDICINE
Payer: MEDICARE

## 2018-09-14 DIAGNOSIS — E78.2 MIXED HYPERLIPIDEMIA: ICD-10-CM

## 2018-09-14 DIAGNOSIS — J30.89 CHRONIC NONSEASONAL ALLERGIC RHINITIS DUE TO POLLEN: ICD-10-CM

## 2018-09-14 DIAGNOSIS — I10 ESSENTIAL HYPERTENSION: ICD-10-CM

## 2018-09-14 LAB
ALBUMIN SERPL BCP-MCNC: 4.2 G/DL (ref 3.2–4.9)
ALBUMIN/GLOB SERPL: 1.6 G/DL
ALP SERPL-CCNC: 41 U/L (ref 30–99)
ALT SERPL-CCNC: 28 U/L (ref 2–50)
ANION GAP SERPL CALC-SCNC: 9 MMOL/L (ref 0–11.9)
AST SERPL-CCNC: 22 U/L (ref 12–45)
BASOPHILS # BLD AUTO: 0.3 % (ref 0–1.8)
BASOPHILS # BLD: 0.02 K/UL (ref 0–0.12)
BILIRUB SERPL-MCNC: 0.8 MG/DL (ref 0.1–1.5)
BUN SERPL-MCNC: 18 MG/DL (ref 8–22)
CALCIUM SERPL-MCNC: 9.5 MG/DL (ref 8.5–10.5)
CHLORIDE SERPL-SCNC: 107 MMOL/L (ref 96–112)
CHOLEST SERPL-MCNC: 148 MG/DL (ref 100–199)
CO2 SERPL-SCNC: 26 MMOL/L (ref 20–33)
CREAT SERPL-MCNC: 1.43 MG/DL (ref 0.5–1.4)
EOSINOPHIL # BLD AUTO: 0.12 K/UL (ref 0–0.51)
EOSINOPHIL NFR BLD: 2 % (ref 0–6.9)
ERYTHROCYTE [DISTWIDTH] IN BLOOD BY AUTOMATED COUNT: 46.2 FL (ref 35.9–50)
FASTING STATUS PATIENT QL REPORTED: NORMAL
GLOBULIN SER CALC-MCNC: 2.6 G/DL (ref 1.9–3.5)
GLUCOSE SERPL-MCNC: 88 MG/DL (ref 65–99)
HCT VFR BLD AUTO: 47.6 % (ref 42–52)
HDLC SERPL-MCNC: 47 MG/DL
HGB BLD-MCNC: 16.2 G/DL (ref 14–18)
IMM GRANULOCYTES # BLD AUTO: 0.04 K/UL (ref 0–0.11)
IMM GRANULOCYTES NFR BLD AUTO: 0.7 % (ref 0–0.9)
LDLC SERPL CALC-MCNC: 56 MG/DL
LYMPHOCYTES # BLD AUTO: 1.69 K/UL (ref 1–4.8)
LYMPHOCYTES NFR BLD: 28.2 % (ref 22–41)
MCH RBC QN AUTO: 33.1 PG (ref 27–33)
MCHC RBC AUTO-ENTMCNC: 34 G/DL (ref 33.7–35.3)
MCV RBC AUTO: 97.3 FL (ref 81.4–97.8)
MONOCYTES # BLD AUTO: 0.51 K/UL (ref 0–0.85)
MONOCYTES NFR BLD AUTO: 8.5 % (ref 0–13.4)
NEUTROPHILS # BLD AUTO: 3.61 K/UL (ref 1.82–7.42)
NEUTROPHILS NFR BLD: 60.3 % (ref 44–72)
NRBC # BLD AUTO: 0 K/UL
NRBC BLD-RTO: 0 /100 WBC
PLATELET # BLD AUTO: 252 K/UL (ref 164–446)
PMV BLD AUTO: 10.8 FL (ref 9–12.9)
POTASSIUM SERPL-SCNC: 4.1 MMOL/L (ref 3.6–5.5)
PROT SERPL-MCNC: 6.8 G/DL (ref 6–8.2)
RBC # BLD AUTO: 4.89 M/UL (ref 4.7–6.1)
SODIUM SERPL-SCNC: 142 MMOL/L (ref 135–145)
TRIGL SERPL-MCNC: 225 MG/DL (ref 0–149)
WBC # BLD AUTO: 6 K/UL (ref 4.8–10.8)

## 2018-09-14 PROCEDURE — 80053 COMPREHEN METABOLIC PANEL: CPT

## 2018-09-14 PROCEDURE — 80061 LIPID PANEL: CPT

## 2018-09-14 PROCEDURE — 36415 COLL VENOUS BLD VENIPUNCTURE: CPT

## 2018-09-14 PROCEDURE — 85025 COMPLETE CBC W/AUTO DIFF WBC: CPT

## 2018-09-19 ENCOUNTER — OFFICE VISIT (OUTPATIENT)
Dept: MEDICAL GROUP | Age: 73
End: 2018-09-19
Payer: MEDICARE

## 2018-09-19 VITALS
BODY MASS INDEX: 27.58 KG/M2 | OXYGEN SATURATION: 96 % | HEIGHT: 71 IN | TEMPERATURE: 99 F | DIASTOLIC BLOOD PRESSURE: 70 MMHG | WEIGHT: 197 LBS | SYSTOLIC BLOOD PRESSURE: 100 MMHG | HEART RATE: 87 BPM

## 2018-09-19 DIAGNOSIS — Z23 NEED FOR VACCINATION: ICD-10-CM

## 2018-09-19 DIAGNOSIS — E78.2 MIXED HYPERLIPIDEMIA: ICD-10-CM

## 2018-09-19 DIAGNOSIS — I10 ESSENTIAL HYPERTENSION: ICD-10-CM

## 2018-09-19 DIAGNOSIS — N18.30 CKD (CHRONIC KIDNEY DISEASE) STAGE 3, GFR 30-59 ML/MIN (HCC): ICD-10-CM

## 2018-09-19 DIAGNOSIS — N40.1 BPH WITH OBSTRUCTION/LOWER URINARY TRACT SYMPTOMS: ICD-10-CM

## 2018-09-19 DIAGNOSIS — N13.8 BPH WITH OBSTRUCTION/LOWER URINARY TRACT SYMPTOMS: ICD-10-CM

## 2018-09-19 PROBLEM — H61.23 BILATERAL IMPACTED CERUMEN: Status: RESOLVED | Noted: 2018-08-14 | Resolved: 2018-09-19

## 2018-09-19 PROCEDURE — 90732 PPSV23 VACC 2 YRS+ SUBQ/IM: CPT | Performed by: INTERNAL MEDICINE

## 2018-09-19 PROCEDURE — 90662 IIV NO PRSV INCREASED AG IM: CPT | Performed by: INTERNAL MEDICINE

## 2018-09-19 PROCEDURE — G0008 ADMIN INFLUENZA VIRUS VAC: HCPCS | Performed by: INTERNAL MEDICINE

## 2018-09-19 PROCEDURE — G0009 ADMIN PNEUMOCOCCAL VACCINE: HCPCS | Performed by: INTERNAL MEDICINE

## 2018-09-19 PROCEDURE — 99215 OFFICE O/P EST HI 40 MIN: CPT | Mod: 25 | Performed by: INTERNAL MEDICINE

## 2018-09-19 ASSESSMENT — PAIN SCALES - GENERAL: PAINLEVEL: NO PAIN

## 2018-09-19 NOTE — ASSESSMENT & PLAN NOTE
Patient has chronic kidney disease stage III with slightly increasing creatinine up to 1.43 and GFR at 49.  Patient has history of kidney stone and last KUB in February 2018 showed that he still has calcified stone on left kidney.  He noticed a few sludge in his urine.  He denied pain or blood in urine.  Patient has follow-up appointment with urologist in February 2018.  Patient stated that he is taking Flomax 0.4 mg daily without any side effects and he is feeling well on urination without hesitation or straining.    Patient denied taking over-the-counter NSAIDs and he also cut down alcohol intake.  He stated that he drinks 2 glasses of wine 3-4 times a week currently.  He used to drink more than that and he used to drink beers a lot.  Patient stated that he stopped drinking beer for a few months.

## 2018-09-19 NOTE — ASSESSMENT & PLAN NOTE
Patient is taking tamsulosin 0.4 mg daily.  He denied side effects from taking it.  He reported that he does not have hesitation or dripping on urination.  He denied sense of incomplete emptying bladder.

## 2018-09-19 NOTE — ASSESSMENT & PLAN NOTE
Patient is taking lovastatin 10 mg every evening.  He denies side effects from taking lovastatin.  His LDL is stable and well controlled but triglyceride remains high at 225.  Patient has normal liver enzymes.  I reviewed his blood test result with him in clinic today.    Results for JOHN DELIA IRWIN (MRN [id number]) as of 9/19/2018 16:31   Ref. Range 9/14/2018 06:04   Cholesterol,Tot Latest Ref Range: 100 - 199 mg/dL 148   Triglycerides Latest Ref Range: 0 - 149 mg/dL 225 (H)   HDL Latest Ref Range: >=40 mg/dL 47   LDL Latest Ref Range: <100 mg/dL 56

## 2018-09-19 NOTE — ASSESSMENT & PLAN NOTE
Patient is taking losartan 100 mg daily.  His blood pressure is stable and well controlled with current regimens.  He has slightly low blood pressure in clinic today.  Patient states that his blood pressure has been steady at 110-120 in systole at home.  I advised patient to cut down losartan 100 mg to half tablet if his blood pressure is less than 110/60.  Patient agreed with the plan.

## 2018-09-20 NOTE — PROGRESS NOTES
Subjective:   Delia Mejía is a 73 y.o. male here today for evaluation and management of:      Mixed hyperlipidemia  Patient is taking lovastatin 10 mg every evening.  He denies side effects from taking lovastatin.  His LDL is stable and well controlled but triglyceride remains high at 225.  Patient has normal liver enzymes.  I reviewed his blood test result with him in clinic today.    Results for DELIA MEJÍA (MRN [id number]) as of 9/19/2018 16:31   Ref. Range 9/14/2018 06:04   Cholesterol,Tot Latest Ref Range: 100 - 199 mg/dL 148   Triglycerides Latest Ref Range: 0 - 149 mg/dL 225 (H)   HDL Latest Ref Range: >=40 mg/dL 47   LDL Latest Ref Range: <100 mg/dL 56       Essential hypertension  Patient is taking losartan 100 mg daily.  His blood pressure is stable and well controlled with current regimens.  He has slightly low blood pressure in clinic today.  Patient states that his blood pressure has been steady at 110-120 in systole at home.  I advised patient to cut down losartan 100 mg to half tablet if his blood pressure is less than 110/60.  Patient agreed with the plan.    CKD (chronic kidney disease) stage 3, GFR 30-59 ml/min  Patient has chronic kidney disease stage III with slightly increasing creatinine up to 1.43 and GFR at 49.  Patient has history of kidney stone and last KUB in February 2018 showed that he still has calcified stone on left kidney.  He noticed a few sludge in his urine.  He denied pain or blood in urine.  Patient has follow-up appointment with urologist in February 2018.  Patient stated that he is taking Flomax 0.4 mg daily without any side effects and he is feeling well on urination without hesitation or straining.    Patient denied taking over-the-counter NSAIDs and he also cut down alcohol intake.  He stated that he drinks 2 glasses of wine 3-4 times a week currently.  He used to drink more than that and he used to drink beers a lot.  Patient stated that he stopped  "drinking beer for a few months.    BPH with obstruction/lower urinary tract symptoms  Patient is taking tamsulosin 0.4 mg daily.  He denied side effects from taking it.  He reported that he does not have hesitation or dripping on urination.  He denied sense of incomplete emptying bladder.         Current medicines (including changes today)  Current Outpatient Prescriptions   Medication Sig Dispense Refill   • Cetirizine HCl (ZYRTEC ALLERGY PO) Take  by mouth.     • azelastine (ASTELIN) 137 MCG/SPRAY nasal spray [place] 1 Spray in nose 2 times a day. 30 mL 3   • lovastatin (MEVACOR) 10 MG tablet Take 1 Tab by mouth every evening. 90 Tab 3   • losartan (COZAAR) 100 MG Tab TAKE 1 TABLET BY MOUTH EVERY DAY (DISCONTINUE LOSARTAN-HCTZ) 90 Tab 3   • fluticasone (FLONASE) 50 MCG/ACT nasal spray USE 2 SPRAYS IN NOSE EVERY DAY 1 Bottle 6   • Omega-3 1000 MG Cap Take  by mouth 2 Times a Day.     • Multiple Vitamin (MULTI VITAMIN PO) Take 1 Tab by mouth every day.     • Cyanocobalamin (VITAMIN B-12) 5000 MCG SL Tab Place 1 Tab under tongue every day.     • Cholecalciferol (VITAMIN D3) 5000 UNITS Cap Take 1 Cap by mouth every day.     • tamsulosin (FLOMAX) 0.4 MG capsule Take 1 Cap by mouth ONE-HALF HOUR AFTER BREAKFAST. 30 Cap 0     No current facility-administered medications for this visit.      He  has a past medical history of Hyperlipidemia; Hypertension; and Kidney stone.    ROS   No chest pain, no shortness of breath, no abdominal pain       Objective:     Blood pressure 100/70, pulse 87, temperature 37.2 °C (99 °F), temperature source Temporal, height 1.803 m (5' 11\"), weight 89.4 kg (197 lb), SpO2 96 %. Body mass index is 27.48 kg/m².   Physical Exam:  General: Alert, oriented and no acute distress.  Eye contact is good, speech goal directed, affect calm  HEENT: conjunctiva non-injected, sclera non-icteric.  Oral mucous membranes pink and moist with no lesions.  Pinna normal.   Lungs: Normal respiratory effort, clear to " auscultation bilaterally with good excursion.  CV: regular rate and rhythm. No murmurs.  Abdomen: soft, non distended, nontender, Bowel sound normal.  Ext: no edema, color normal, vascularity normal, temperature normal    I reviewed his blood tests in previous abdomen x-ray report done on 2/5/18 with him in clinic today.    Assessment and Plan:   The following treatment plan was discussed     1. Mixed hyperlipidemia  - Well-controlled. Continue current regimens, lovastatin 10 mg every evening.  Advised to take omega-3 1000 mg twice a day for hypertriglyceridemia.  Recheck lab 1-2 weeks before next follow up visit.  - Reviewed the risks and benefits of treatment and potential side effects of medication.  - Advised to eat low fat, low carbohydrate and high fiber diet as well as do cardio physical exercise regularly.  - COMP METABOLIC PANEL; Future  - LIPID PROFILE; Future    2. Essential hypertension  - Well-controlled. Continue current regimens, losartan 100 mg daily. Recheck lab 1-2 weeks before next follow up visit.  Advised patient to cut down the dose of losartan to half if his blood pressure is less than 110/60.  - Reviewed the risks and benefits as well as potential side effects of medications with patient.  - Discussed to eat low-sodium diet and encouraged to do regular physical exercise.  - Recommend to monitor blood pressure and heart rate at home.  - US-RENAL    3. BPH with obstruction/lower urinary tract symptoms  - Well-controlled. Continue current regimens, Flomax 0.4 mg daily.  Review potential side effects of Flomax with patient.. Recheck lab 1-2 weeks before next follow up visit.  - Patient will follow with urologist in February 2019.    4. CKD (chronic kidney disease) stage 3, GFR 30-59 ml/min  - Patient concern his kidney a lot after knowing that his creatinine increased slightly from previous blood test.  Recent creatinine was 1.43 on 9/14/18.  He has creatinine 1.52 on 6/5/17.  - I discussed  possible causes of chronic kidney disease and causes of kidney injury with patient including but not limited to urinary retention or obstruction from BPH, kidney stone, hypertension, medication induced, poor hydration.  Patient does not have diabetes.  He stopped taking NSAIDs already.  He also cut down alcohol intake.  Patient will have KUB with urology in February 2019.  - I will order kidney ultrasound for further evaluation and to rule out kidney tumor or cyst.  Patient will have kidney ultrasound in 3 months before next follow-up appointment.  - Patient is advised to keep well-hydrated with water 40-60 ounce per day.  Patient is advised to avoid drinking alcohol and avoid NSAIDs.  - Continue losartan 100 mg daily.  - If his kidney function does not improve in the next 3 months or get worse, I will refer to nephrology.  Patient agreed with the plan.  - US-RENAL    5. Need for vaccination  -Influenza and Pneumovax 23 vaccine were given today after reviewing risks and benefits as well as side effects of vaccine.  - Influenza Vaccine, High Dose (65+ Only)  - Pneumococal Polysaccharide Vaccine 23-Valent =>1YO SQ/IM    Face-to-face time spent 40 minutes with patient and more than half of that time spent for counseling and cooperating of care for medical problems listed above.       Followup: Return in about 3 months (around 12/19/2018), or if symptoms worsen or fail to improve, for Hypertension, Hyperlipidemia, BPH, CKD, Lab review.      Please note that this dictation was created using voice recognition software. I have made every reasonable attempt to correct obvious errors, but I expect that there may have unintended errors in text, spelling, punctuation, or grammar that I did not discover.

## 2018-10-30 ENCOUNTER — HOSPITAL ENCOUNTER (OUTPATIENT)
Dept: RADIOLOGY | Facility: MEDICAL CENTER | Age: 73
End: 2018-10-30
Attending: INTERNAL MEDICINE
Payer: MEDICARE

## 2018-10-30 DIAGNOSIS — N18.30 CKD (CHRONIC KIDNEY DISEASE) STAGE 3, GFR 30-59 ML/MIN (HCC): ICD-10-CM

## 2018-10-30 DIAGNOSIS — I70.1 RENAL ARTERY STENOSIS (HCC): ICD-10-CM

## 2018-10-30 PROCEDURE — 93975 VASCULAR STUDY: CPT

## 2018-10-31 NOTE — PROGRESS NOTES
Refer to nephrologist for chronic kidney disease with worsening kidney function and right renal artery stenosis.  I discussed with patient through my chart message and ultrasound result note.    Kelli Sylvester M.D.

## 2018-12-20 ENCOUNTER — APPOINTMENT (OUTPATIENT)
Dept: NEPHROLOGY | Facility: MEDICAL CENTER | Age: 73
End: 2018-12-20
Payer: MEDICARE

## 2018-12-28 ENCOUNTER — HOSPITAL ENCOUNTER (OUTPATIENT)
Dept: LAB | Facility: MEDICAL CENTER | Age: 73
End: 2018-12-28
Attending: INTERNAL MEDICINE
Payer: MEDICARE

## 2018-12-28 DIAGNOSIS — E78.2 MIXED HYPERLIPIDEMIA: ICD-10-CM

## 2018-12-28 LAB
ALBUMIN SERPL BCP-MCNC: 4.2 G/DL (ref 3.2–4.9)
ALBUMIN/GLOB SERPL: 1.5 G/DL
ALP SERPL-CCNC: 49 U/L (ref 30–99)
ALT SERPL-CCNC: 24 U/L (ref 2–50)
ANION GAP SERPL CALC-SCNC: 9 MMOL/L (ref 0–11.9)
AST SERPL-CCNC: 19 U/L (ref 12–45)
BILIRUB SERPL-MCNC: 0.6 MG/DL (ref 0.1–1.5)
BUN SERPL-MCNC: 20 MG/DL (ref 8–22)
CALCIUM SERPL-MCNC: 9.2 MG/DL (ref 8.5–10.5)
CHLORIDE SERPL-SCNC: 108 MMOL/L (ref 96–112)
CHOLEST SERPL-MCNC: 167 MG/DL (ref 100–199)
CO2 SERPL-SCNC: 23 MMOL/L (ref 20–33)
CREAT SERPL-MCNC: 1.49 MG/DL (ref 0.5–1.4)
FASTING STATUS PATIENT QL REPORTED: NORMAL
GLOBULIN SER CALC-MCNC: 2.8 G/DL (ref 1.9–3.5)
GLUCOSE SERPL-MCNC: 81 MG/DL (ref 65–99)
HDLC SERPL-MCNC: 47 MG/DL
LDLC SERPL CALC-MCNC: 71 MG/DL
POTASSIUM SERPL-SCNC: 4.2 MMOL/L (ref 3.6–5.5)
PROT SERPL-MCNC: 7 G/DL (ref 6–8.2)
SODIUM SERPL-SCNC: 140 MMOL/L (ref 135–145)
TRIGL SERPL-MCNC: 245 MG/DL (ref 0–149)

## 2018-12-28 PROCEDURE — 80061 LIPID PANEL: CPT

## 2018-12-28 PROCEDURE — 80053 COMPREHEN METABOLIC PANEL: CPT

## 2018-12-28 PROCEDURE — 36415 COLL VENOUS BLD VENIPUNCTURE: CPT

## 2019-01-03 ENCOUNTER — OFFICE VISIT (OUTPATIENT)
Dept: NEPHROLOGY | Facility: MEDICAL CENTER | Age: 74
End: 2019-01-03
Payer: MEDICARE

## 2019-01-03 VITALS
TEMPERATURE: 97.6 F | OXYGEN SATURATION: 98 % | HEIGHT: 71 IN | RESPIRATION RATE: 16 BRPM | WEIGHT: 196 LBS | HEART RATE: 66 BPM | SYSTOLIC BLOOD PRESSURE: 136 MMHG | DIASTOLIC BLOOD PRESSURE: 72 MMHG | BODY MASS INDEX: 27.44 KG/M2

## 2019-01-03 DIAGNOSIS — I70.1 RENAL ARTERY STENOSIS (HCC): ICD-10-CM

## 2019-01-03 DIAGNOSIS — N18.30 CKD (CHRONIC KIDNEY DISEASE) STAGE 3, GFR 30-59 ML/MIN (HCC): ICD-10-CM

## 2019-01-03 DIAGNOSIS — I10 ESSENTIAL HYPERTENSION: ICD-10-CM

## 2019-01-03 DIAGNOSIS — N20.0 KIDNEY CALCULUS: ICD-10-CM

## 2019-01-03 DIAGNOSIS — E55.9 VITAMIN D DEFICIENCY: ICD-10-CM

## 2019-01-03 PROCEDURE — 99204 OFFICE O/P NEW MOD 45 MIN: CPT | Performed by: INTERNAL MEDICINE

## 2019-01-03 ASSESSMENT — ENCOUNTER SYMPTOMS
WEIGHT LOSS: 0
FEVER: 0
ABDOMINAL PAIN: 0
DIARRHEA: 0
HEMOPTYSIS: 0
WHEEZING: 0
CHILLS: 0
COUGH: 0
NAUSEA: 0
PALPITATIONS: 0
ORTHOPNEA: 0
HEARTBURN: 0
SPUTUM PRODUCTION: 0
FLANK PAIN: 0
VOMITING: 0
SHORTNESS OF BREATH: 0

## 2019-01-04 NOTE — PROGRESS NOTES
"Subjective:      Franklin Mejía is a 73 y.o. male who presents with New Patient and Chronic Kidney Disease            HPI  Delgado is coming today for initial evaluation of CKD III  Baseline creat level at 1.2-1.5  HTN: Bp well controlled with Losartan  Hx/of Nephrolithiasis: s/p lithotripsy -f/u in Urology Clinic  No dysuria/hematuria/flank pain    Review of Systems   Constitutional: Negative for chills, fever, malaise/fatigue and weight loss.   Respiratory: Negative for cough, hemoptysis, sputum production, shortness of breath and wheezing.    Cardiovascular: Negative for chest pain, palpitations, orthopnea and leg swelling.   Gastrointestinal: Negative for abdominal pain, diarrhea, heartburn, nausea and vomiting.   Genitourinary: Negative for dysuria, flank pain, frequency, hematuria and urgency.   Skin: Negative.    All other systems reviewed and are negative.         Objective:     /72 (BP Location: Right arm, Patient Position: Sitting)   Pulse 66   Temp 36.4 °C (97.6 °F)   Resp 16   Ht 1.803 m (5' 11\")   Wt 88.9 kg (196 lb)   SpO2 98%   BMI 27.34 kg/m²      Physical Exam   Constitutional: He appears well-developed and well-nourished. No distress.   HENT:   Head: Normocephalic and atraumatic.   Nose: Nose normal.   Mouth/Throat: Oropharynx is clear and moist.   Eyes: Pupils are equal, round, and reactive to light. Conjunctivae and EOM are normal.   Neck: Normal range of motion. Neck supple. No thyromegaly present.   Cardiovascular: Normal rate and regular rhythm.  Exam reveals no gallop and no friction rub.    Pulmonary/Chest: Effort normal and breath sounds normal. No respiratory distress. He has no wheezes. He has no rales.   Abdominal: Soft. Bowel sounds are normal. He exhibits no distension. There is no tenderness.   Musculoskeletal: He exhibits no edema.   Neurological: He is alert. No cranial nerve deficit. Coordination normal.   Skin: Skin is warm. No rash noted. No erythema.   Vitals " reviewed.         Laboratory and renal US results reviewed: d/w Pt  Lab Results   Component Value Date/Time    CREATININE 1.49 (H) 12/28/2018 06:04 AM    POTASSIUM 4.2 12/28/2018 06:04 AM          Assessment/Plan:     1. CKD (chronic kidney disease) stage 3, GFR 30-59 ml/min ([facility] [place])      Stable at baseline -to monitor  - CBC WITHOUT DIFFERENTIAL; Future  - URINALYSIS; Future  - BASIC METABOLIC PANEL; Future  - PTH INTACT (PTH ONLY); Future  - MICROALB/CREAT RATIO, RANDOM UR    2. Essential hypertension      BP well controlled  - BASIC METABOLIC PANEL; Future    3. Renal artery stenosis (HCC), right      Not significant -BP well controlled with one agent -to monitor    4. Kidney calculus      Left kidney -non obstructing kidney stone    5. Vitamin D deficiency    - PTH INTACT (PTH ONLY); Future  - VITAMIN D 25-HYDROXY    Recs: monitor BP, low Na diet, avoid NSAID's             F/u in 3 months     Thank you for the consult

## 2019-01-07 ENCOUNTER — TELEPHONE (OUTPATIENT)
Dept: MEDICAL GROUP | Age: 74
End: 2019-01-07

## 2019-01-07 NOTE — TELEPHONE ENCOUNTER
ESTABLISHED PATIENT PRE-VISIT PLANNING     Patient was NOT contacted to complete PVP.     Note: Patient will not be contacted if there is no indication to call.     1.  Reviewed notes from the last few office visits within the medical group: Yes    2.  If any orders were placed at last visit or intended to be done for this visit (i.e. 6 mos follow-up), do we have Results/Consult Notes?        •  Labs - Labs ordered, completed on 12/28/18 and results are in chart.   Note: If patient appointment is for lab review and patient did not complete labs, check with provider if OK to reschedule patient until labs completed.       •  Imaging - Imaging ordered, completed and results are in chart.       •  Referrals - SCHEDULED    3. Is this appointment scheduled as a Hospital Follow-Up? No    4.  Immunizations were updated in Epic using WebIZ?: Epic matches WebIZ       •  Web Iz Recommendations: SHINGRIX (Shingles)    5.  Patient is due for the following Health Maintenance Topics:   Health Maintenance Due   Topic Date Due   • IMM ZOSTER VACCINES (1 of 2) 01/12/1997       - Patient is up-to-date on all Health Maintenance topics. No records have been requested at this time.    6. Orders for overdue Health Maintenance topics pended in Pre-Charting? N\A    7.  AHA (MDX) form printed for Provider? YES    8.  Patient was NOT informed to arrive 15 min prior to their scheduled appointment and bring in their medication bottles.

## 2019-01-09 ENCOUNTER — OFFICE VISIT (OUTPATIENT)
Dept: MEDICAL GROUP | Age: 74
End: 2019-01-09
Payer: MEDICARE

## 2019-01-09 VITALS
TEMPERATURE: 97.8 F | HEART RATE: 78 BPM | DIASTOLIC BLOOD PRESSURE: 60 MMHG | SYSTOLIC BLOOD PRESSURE: 110 MMHG | WEIGHT: 194.6 LBS | OXYGEN SATURATION: 94 % | BODY MASS INDEX: 27.24 KG/M2 | HEIGHT: 71 IN

## 2019-01-09 DIAGNOSIS — N13.8 BPH WITH OBSTRUCTION/LOWER URINARY TRACT SYMPTOMS: ICD-10-CM

## 2019-01-09 DIAGNOSIS — E78.2 MIXED HYPERLIPIDEMIA: ICD-10-CM

## 2019-01-09 DIAGNOSIS — N40.1 BPH WITH OBSTRUCTION/LOWER URINARY TRACT SYMPTOMS: ICD-10-CM

## 2019-01-09 DIAGNOSIS — N18.30 CKD (CHRONIC KIDNEY DISEASE) STAGE 3, GFR 30-59 ML/MIN (HCC): ICD-10-CM

## 2019-01-09 DIAGNOSIS — I10 ESSENTIAL HYPERTENSION: ICD-10-CM

## 2019-01-09 PROCEDURE — 99214 OFFICE O/P EST MOD 30 MIN: CPT | Performed by: INTERNAL MEDICINE

## 2019-01-09 PROCEDURE — 8041 PR SCP AHA: Performed by: INTERNAL MEDICINE

## 2019-01-09 ASSESSMENT — PATIENT HEALTH QUESTIONNAIRE - PHQ9: CLINICAL INTERPRETATION OF PHQ2 SCORE: 0

## 2019-01-09 NOTE — ASSESSMENT & PLAN NOTE
Patient is taking tamsulosin 0.4 mg daily.  He stated that his urination is stable and well controlled.  He denies side effects from taking tamsulosin.  Renal arterial ultrasound showed that he has right renal artery stenosis, left kidney stone at 1.1 cm, multiple left renal cysts, thickened trabeculated urinary bladder wall.  Patient has follow-up appointment with Dr. Addison, urologist in a few weeks.  He already saw nephrologist for chronic kidney disease and right renal artery stenosis.  Nephrologist recommended to continue losartan 100 mg daily, avoid NSAIDs and keep well-hydrated.

## 2019-01-09 NOTE — PATIENT INSTRUCTIONS
Today, your Healthcare Provider may have discussed the following recommendations:    1. Exercise and Physical Activity  According to the American Heart Association, it is recommended to engage in physical activity regularly and to aim for 150 minutes of moderate-intensity aerobic activity per week.  Your Healthcare Provider may have recommended taking the stairs instead of the elevator, starting or maintaining a walking program or strength-training program.    2. Emotional Well-being  Mental and emotional well-being is essential to overall health.  Your Healthcare Provider may have encouraged you to build strong, positive relationships with family and friends, become more involved in your community (by volunteering or joining a spiritual community), or focus on self-care.    3. Fall and Injury Prevention  To prevent falls and injuries and also improve your balance, your Healthcare Provider may have suggested that you use a cane or walker, start an exercise of physical therapy program, or have your vision and/or hearing tested.    4. Urinary Leakage (Urinary Incontinence)  To control or manage the leakage of urine, your Healthcare Provider may have recommended you start bladder training exercises (such as Kegel exercises), a trial of a medication or a referral to see a specialist to discuss surgical options.

## 2019-01-09 NOTE — ASSESSMENT & PLAN NOTE
Patient is taking lovastatin 10 mg every evening and omega-3 twice a day.  He denies side effects from taking lovastatin.  His total cholesterol and LDL cholesterol are well controlled.  His triglycerides remains high.  He has normal liver enzymes.  I discussed blood test result with patient in clinic today.    Results for TRISHADELIA PEREZ (MRN [id number]) as of 1/9/2019 14:32   Ref. Range 1/18/2018 06:14 9/14/2018 06:04 12/28/2018 06:04   Cholesterol,Tot Latest Ref Range: 100 - 199 mg/dL 144 148 167   Triglycerides Latest Ref Range: 0 - 149 mg/dL 190 (H) 225 (H) 245 (H)   HDL Latest Ref Range: >=40 mg/dL 38 (A) 47 47   LDL Latest Ref Range: <100 mg/dL 68 56 71

## 2019-01-09 NOTE — PROGRESS NOTES
Annual Health Assessment Questions:    1.  Are you currently engaging in any exercise or physical activity? Yes    2.  How would you describe your mood or emotional well-being today? good    3.  Have you had any falls in the last year? No    4.  Have you noticed any problems with your balance or had difficulty walking? No    5.  In the last six months have you experienced any leakage of urine? No    6. DPA/Advanced Directive: Patient has Advanced Directive, but it is not on file. Instructed to bring in a copy to scan into their chart.

## 2019-01-09 NOTE — PROGRESS NOTES
Subjective:   Delia Mejía is a 73 y.o. male here today for evaluation and management of:      Mixed hyperlipidemia  Patient is taking lovastatin 10 mg every evening and omega-3 twice a day.  He denies side effects from taking lovastatin.  His total cholesterol and LDL cholesterol are well controlled.  His triglycerides remains high.  He has normal liver enzymes.  I discussed blood test result with patient in clinic today.    Results for DELIA MEJÍA (MRN [id number]) as of 1/9/2019 14:32   Ref. Range 1/18/2018 06:14 9/14/2018 06:04 12/28/2018 06:04   Cholesterol,Tot Latest Ref Range: 100 - 199 mg/dL 144 148 167   Triglycerides Latest Ref Range: 0 - 149 mg/dL 190 (H) 225 (H) 245 (H)   HDL Latest Ref Range: >=40 mg/dL 38 (A) 47 47   LDL Latest Ref Range: <100 mg/dL 68 56 71       Essential hypertension  Patient is taking losartan 100 mg daily.  His blood pressure is stable and well controlled.  He denies side effects from taking losartan.    CKD (chronic kidney disease) stage 3, GFR 30-59 ml/min  Patient has right renal artery stenosis and chronic kidney disease stage III.  Patient was seen by Dr. Vazquez, nephrologist on 1/3/19.  Nephrology recommended to continue losartan 100 mg daily to control his blood pressure.  He does not take any NSAIDs.  Patient has follow-up appointment with nephrologist in April 2019 with blood test.  He will have follow-up appointment with urologist for left kidney stone in a few weeks.  Patient does not have any pain on urination or flank pain or blood in urine.    BPH with obstruction/lower urinary tract symptoms  Patient is taking tamsulosin 0.4 mg daily.  He stated that his urination is stable and well controlled.  He denies side effects from taking tamsulosin.  Renal arterial ultrasound showed that he has right renal artery stenosis, left kidney stone at 1.1 cm, multiple left renal cysts, thickened trabeculated urinary bladder wall.  Patient has follow-up appointment with  "Dr. Addison, urologist in a few weeks.  He already saw nephrologist for chronic kidney disease and right renal artery stenosis.  Nephrologist recommended to continue losartan 100 mg daily, avoid NSAIDs and keep well-hydrated.         Current medicines (including changes today)  Current Outpatient Prescriptions   Medication Sig Dispense Refill   • coenzyme Q-10 30 MG capsule Take 60 mg by mouth every day.     • azelastine (ASTELIN) 137 MCG/SPRAY nasal spray [place] 1 Spray in nose 2 times a day. 30 mL 3   • lovastatin (MEVACOR) 10 MG tablet Take 1 Tab by mouth every evening. 90 Tab 3   • losartan (COZAAR) 100 MG Tab TAKE 1 TABLET BY MOUTH EVERY DAY (DISCONTINUE LOSARTAN-HCTZ) 90 Tab 3   • fluticasone (FLONASE) 50 MCG/ACT nasal spray USE 2 SPRAYS IN NOSE EVERY DAY 1 Bottle 6   • Omega-3 1000 MG Cap Take  by mouth 2 Times a Day.     • Multiple Vitamin (MULTI VITAMIN PO) Take 1 Tab by mouth every day.     • Cyanocobalamin (VITAMIN B-12) 5000 MCG SL Tab Place 1 Tab under tongue every day.     • Cholecalciferol (VITAMIN D3) 5000 UNITS Cap Take 1 Cap by mouth every day.     • tamsulosin (FLOMAX) 0.4 MG capsule Take 1 Cap by mouth ONE-HALF HOUR AFTER BREAKFAST. 30 Cap 0     No current facility-administered medications for this visit.      He  has a past medical history of Hyperlipidemia; Hypertension; and Kidney stone.    ROS   No chest pain, no shortness of breath, no abdominal pain       Objective:     Blood pressure 110/60, pulse 78, temperature 36.6 °C (97.8 °F), temperature source Temporal, height 1.803 m (5' 11\"), weight 88.3 kg (194 lb 9.6 oz), SpO2 94 %. Body mass index is 27.14 kg/m².   Physical Exam:  General: Alert, oriented and no acute distress.  Eye contact is good, speech goal directed, affect calm  HEENT: conjunctiva non-injected, sclera non-icteric.  Oral mucous membranes pink and moist with no lesions.  Pinna normal.   Lungs: Normal respiratory effort, clear to auscultation bilaterally with good excursion.  CV: " regular rate and rhythm. No murmurs.   Abdomen: soft, non distended, nontender, Bowel sound normal.  Ext: no edema, color normal, vascularity normal, temperature normal      Assessment and Plan:   The following treatment plan was discussed     1. Mixed hyperlipidemia  - LDL is well-controlled.  Triglyceride remains high.  Continue current regimens, lovastatin 10 mg every evening. Recheck lab 1-2 weeks before next follow up visit.  - Reviewed the risks and benefits of treatment and potential side effects of medication.  - Advised to eat low fat, low carbohydrate and high fiber diet as well as do cardio physical exercise regularly.  - COMP METABOLIC PANEL; Future  - Lipid Profile; Future    2. Essential hypertension  - Well-controlled. Continue current regimens, losartan 100 mg daily. Recheck lab 1-2 weeks before next follow up visit.  - Reviewed the risks and benefits as well as potential side effects of medications with patient.  - Discussed to eat low-sodium diet and encouraged to do regular physical exercise.  - Recommend to monitor blood pressure and heart rate at home.    3. BPH with obstruction/lower urinary tract symptoms  - Well-controlled. Continue current regimens, Flomax 0.4 mg daily. Recheck lab 1-2 weeks before next follow up visit.    4. CKD (chronic kidney disease) stage 3, GFR 30-59 ml/min ([facility])  - Stable.  Continue losartan 100 mg daily.  Recommend to avoid NSAIDs.  Patient will follow with nephrologist as scheduled.    MDX form was reviewed and completed today.    Followup: Return in about 6 months (around 7/9/2019), or if symptoms worsen or fail to improve, for Hypertension, Hyperlipidemia, CKD, Lab review.      Please note that this dictation was created using voice recognition software. I have made every reasonable attempt to correct obvious errors, but I expect that there may have unintended errors in text, spelling, punctuation, or grammar that I did not discover.

## 2019-01-09 NOTE — ASSESSMENT & PLAN NOTE
Patient has right renal artery stenosis and chronic kidney disease stage III.  Patient was seen by Dr. Vazquez, nephrologist on 1/3/19.  Nephrology recommended to continue losartan 100 mg daily to control his blood pressure.  He does not take any NSAIDs.  Patient has follow-up appointment with nephrologist in April 2019 with blood test.  He will have follow-up appointment with urologist for left kidney stone in a few weeks.  Patient does not have any pain on urination or flank pain or blood in urine.

## 2019-01-09 NOTE — ASSESSMENT & PLAN NOTE
Patient is taking losartan 100 mg daily.  His blood pressure is stable and well controlled.  He denies side effects from taking losartan.

## 2019-02-01 ENCOUNTER — HOSPITAL ENCOUNTER (OUTPATIENT)
Dept: RADIOLOGY | Facility: MEDICAL CENTER | Age: 74
End: 2019-02-01
Attending: UROLOGY
Payer: MEDICARE

## 2019-02-01 DIAGNOSIS — N20.0 URIC ACID NEPHROLITHIASIS: ICD-10-CM

## 2019-02-01 PROCEDURE — 74176 CT ABD & PELVIS W/O CONTRAST: CPT

## 2019-02-20 DIAGNOSIS — Z01.810 PRE-OPERATIVE CARDIOVASCULAR EXAMINATION: ICD-10-CM

## 2019-02-20 DIAGNOSIS — Z01.812 PRE-OPERATIVE LABORATORY EXAMINATION: ICD-10-CM

## 2019-02-20 LAB
ANION GAP SERPL CALC-SCNC: 8 MMOL/L (ref 0–11.9)
BASOPHILS # BLD AUTO: 0.6 % (ref 0–1.8)
BASOPHILS # BLD: 0.04 K/UL (ref 0–0.12)
BUN SERPL-MCNC: 22 MG/DL (ref 8–22)
CALCIUM SERPL-MCNC: 9.2 MG/DL (ref 8.5–10.5)
CHLORIDE SERPL-SCNC: 108 MMOL/L (ref 96–112)
CO2 SERPL-SCNC: 24 MMOL/L (ref 20–33)
CREAT SERPL-MCNC: 1.53 MG/DL (ref 0.5–1.4)
EKG IMPRESSION: NORMAL
EOSINOPHIL # BLD AUTO: 0.11 K/UL (ref 0–0.51)
EOSINOPHIL NFR BLD: 1.8 % (ref 0–6.9)
ERYTHROCYTE [DISTWIDTH] IN BLOOD BY AUTOMATED COUNT: 45.3 FL (ref 35.9–50)
GLUCOSE SERPL-MCNC: 92 MG/DL (ref 65–99)
HCT VFR BLD AUTO: 47.1 % (ref 42–52)
HGB BLD-MCNC: 15.8 G/DL (ref 14–18)
IMM GRANULOCYTES # BLD AUTO: 0.02 K/UL (ref 0–0.11)
IMM GRANULOCYTES NFR BLD AUTO: 0.3 % (ref 0–0.9)
LYMPHOCYTES # BLD AUTO: 1.51 K/UL (ref 1–4.8)
LYMPHOCYTES NFR BLD: 24 % (ref 22–41)
MCH RBC QN AUTO: 32.8 PG (ref 27–33)
MCHC RBC AUTO-ENTMCNC: 33.5 G/DL (ref 33.7–35.3)
MCV RBC AUTO: 97.9 FL (ref 81.4–97.8)
MONOCYTES # BLD AUTO: 0.49 K/UL (ref 0–0.85)
MONOCYTES NFR BLD AUTO: 7.8 % (ref 0–13.4)
NEUTROPHILS # BLD AUTO: 4.11 K/UL (ref 1.82–7.42)
NEUTROPHILS NFR BLD: 65.5 % (ref 44–72)
NRBC # BLD AUTO: 0 K/UL
NRBC BLD-RTO: 0 /100 WBC
PLATELET # BLD AUTO: 252 K/UL (ref 164–446)
PMV BLD AUTO: 11 FL (ref 9–12.9)
POTASSIUM SERPL-SCNC: 4 MMOL/L (ref 3.6–5.5)
RBC # BLD AUTO: 4.81 M/UL (ref 4.7–6.1)
SODIUM SERPL-SCNC: 140 MMOL/L (ref 135–145)
WBC # BLD AUTO: 6.3 K/UL (ref 4.8–10.8)

## 2019-02-20 PROCEDURE — 85025 COMPLETE CBC W/AUTO DIFF WBC: CPT

## 2019-02-20 PROCEDURE — 80048 BASIC METABOLIC PNL TOTAL CA: CPT

## 2019-02-20 PROCEDURE — 93005 ELECTROCARDIOGRAM TRACING: CPT

## 2019-02-20 PROCEDURE — 93010 ELECTROCARDIOGRAM REPORT: CPT | Performed by: INTERNAL MEDICINE

## 2019-02-22 ENCOUNTER — HOSPITAL ENCOUNTER (OUTPATIENT)
Facility: MEDICAL CENTER | Age: 74
End: 2019-02-22
Attending: SURGERY | Admitting: SURGERY
Payer: MEDICARE

## 2019-02-22 VITALS
BODY MASS INDEX: 27.16 KG/M2 | HEIGHT: 71 IN | HEART RATE: 68 BPM | OXYGEN SATURATION: 95 % | WEIGHT: 194 LBS | TEMPERATURE: 98.5 F | DIASTOLIC BLOOD PRESSURE: 87 MMHG | SYSTOLIC BLOOD PRESSURE: 143 MMHG | RESPIRATION RATE: 23 BRPM

## 2019-02-22 DIAGNOSIS — G89.18 POST-OPERATIVE PAIN: ICD-10-CM

## 2019-02-22 PROCEDURE — 160009 HCHG ANES TIME/MIN: Performed by: SURGERY

## 2019-02-22 PROCEDURE — 700111 HCHG RX REV CODE 636 W/ 250 OVERRIDE (IP)

## 2019-02-22 PROCEDURE — 501570 HCHG TROCAR, SEPARATOR: Performed by: SURGERY

## 2019-02-22 PROCEDURE — 503366 HCHG TROCAR, 12X150 KII FIOS Z THR: Performed by: SURGERY

## 2019-02-22 PROCEDURE — 700101 HCHG RX REV CODE 250

## 2019-02-22 PROCEDURE — 160035 HCHG PACU - 1ST 60 MINS PHASE I: Performed by: SURGERY

## 2019-02-22 PROCEDURE — 700102 HCHG RX REV CODE 250 W/ 637 OVERRIDE(OP): Performed by: ANESTHESIOLOGY

## 2019-02-22 PROCEDURE — C1781 MESH (IMPLANTABLE): HCPCS | Performed by: SURGERY

## 2019-02-22 PROCEDURE — 160029 HCHG SURGERY MINUTES - 1ST 30 MINS LEVEL 4: Performed by: SURGERY

## 2019-02-22 PROCEDURE — 500868 HCHG NEEDLE, SURGI(VARES): Performed by: SURGERY

## 2019-02-22 PROCEDURE — 160002 HCHG RECOVERY MINUTES (STAT): Performed by: SURGERY

## 2019-02-22 PROCEDURE — 160048 HCHG OR STATISTICAL LEVEL 1-5: Performed by: SURGERY

## 2019-02-22 PROCEDURE — 500002 HCHG ADHESIVE, DERMABOND: Performed by: SURGERY

## 2019-02-22 PROCEDURE — 501838 HCHG SUTURE GENERAL: Performed by: SURGERY

## 2019-02-22 PROCEDURE — 502714 HCHG ROBOTIC SURGERY SERVICES: Performed by: SURGERY

## 2019-02-22 PROCEDURE — A9270 NON-COVERED ITEM OR SERVICE: HCPCS | Performed by: ANESTHESIOLOGY

## 2019-02-22 PROCEDURE — 160041 HCHG SURGERY MINUTES - EA ADDL 1 MIN LEVEL 4: Performed by: SURGERY

## 2019-02-22 PROCEDURE — 160046 HCHG PACU - 1ST 60 MINS PHASE II: Performed by: SURGERY

## 2019-02-22 PROCEDURE — 160025 RECOVERY II MINUTES (STATS): Performed by: SURGERY

## 2019-02-22 DEVICE — MESH PROGRIP LAPROSCOPIC SELF FIXATING (1/CA): Type: IMPLANTABLE DEVICE | Site: ABDOMEN | Status: FUNCTIONAL

## 2019-02-22 RX ORDER — HALOPERIDOL 5 MG/ML
1 INJECTION INTRAMUSCULAR
Status: DISCONTINUED | OUTPATIENT
Start: 2019-02-22 | End: 2019-02-22 | Stop reason: HOSPADM

## 2019-02-22 RX ORDER — BUPIVACAINE HYDROCHLORIDE AND EPINEPHRINE 5; 5 MG/ML; UG/ML
INJECTION, SOLUTION EPIDURAL; INTRACAUDAL; PERINEURAL
Status: DISCONTINUED | OUTPATIENT
Start: 2019-02-22 | End: 2019-02-22 | Stop reason: HOSPADM

## 2019-02-22 RX ORDER — OXYCODONE HYDROCHLORIDE 5 MG/1
5 TABLET ORAL EVERY 4 HOURS PRN
Qty: 20 TAB | Refills: 0 | Status: SHIPPED | OUTPATIENT
Start: 2019-02-22 | End: 2019-02-26

## 2019-02-22 RX ORDER — OXYCODONE HCL 5 MG/5 ML
5 SOLUTION, ORAL ORAL
Status: COMPLETED | OUTPATIENT
Start: 2019-02-22 | End: 2019-02-22

## 2019-02-22 RX ORDER — SODIUM CHLORIDE, SODIUM LACTATE, POTASSIUM CHLORIDE, CALCIUM CHLORIDE 600; 310; 30; 20 MG/100ML; MG/100ML; MG/100ML; MG/100ML
INJECTION, SOLUTION INTRAVENOUS ONCE
Status: COMPLETED | OUTPATIENT
Start: 2019-02-22 | End: 2019-02-22

## 2019-02-22 RX ORDER — ONDANSETRON 2 MG/ML
4 INJECTION INTRAMUSCULAR; INTRAVENOUS
Status: DISCONTINUED | OUTPATIENT
Start: 2019-02-22 | End: 2019-02-22 | Stop reason: HOSPADM

## 2019-02-22 RX ORDER — OXYCODONE HCL 5 MG/5 ML
10 SOLUTION, ORAL ORAL
Status: COMPLETED | OUTPATIENT
Start: 2019-02-22 | End: 2019-02-22

## 2019-02-22 RX ORDER — DIPHENHYDRAMINE HYDROCHLORIDE 50 MG/ML
12.5 INJECTION INTRAMUSCULAR; INTRAVENOUS
Status: DISCONTINUED | OUTPATIENT
Start: 2019-02-22 | End: 2019-02-22 | Stop reason: HOSPADM

## 2019-02-22 RX ORDER — SODIUM CHLORIDE, SODIUM LACTATE, POTASSIUM CHLORIDE, CALCIUM CHLORIDE 600; 310; 30; 20 MG/100ML; MG/100ML; MG/100ML; MG/100ML
INJECTION, SOLUTION INTRAVENOUS CONTINUOUS
Status: DISCONTINUED | OUTPATIENT
Start: 2019-02-22 | End: 2019-02-22 | Stop reason: HOSPADM

## 2019-02-22 RX ORDER — LIDOCAINE HYDROCHLORIDE 10 MG/ML
INJECTION, SOLUTION INFILTRATION; PERINEURAL
Status: COMPLETED
Start: 2019-02-22 | End: 2019-02-22

## 2019-02-22 RX ADMIN — SODIUM CHLORIDE, SODIUM LACTATE, POTASSIUM CHLORIDE, CALCIUM CHLORIDE: 600; 310; 30; 20 INJECTION, SOLUTION INTRAVENOUS at 07:46

## 2019-02-22 RX ADMIN — LIDOCAINE HYDROCHLORIDE: 10 INJECTION, SOLUTION INFILTRATION; PERINEURAL at 07:46

## 2019-02-22 RX ADMIN — OXYCODONE HYDROCHLORIDE 10 MG: 5 SOLUTION ORAL at 10:12

## 2019-02-22 RX ADMIN — SODIUM CHLORIDE, SODIUM LACTATE, POTASSIUM CHLORIDE, CALCIUM CHLORIDE: 600; 310; 30; 20 INJECTION, SOLUTION INTRAVENOUS at 10:14

## 2019-02-22 NOTE — OP REPORT
Post OP Note    PreOp Diagnosis: Left inguinal hernia    PostOp Diagnosis: Direct left inguinal hernia    Procedure(s):  INGUINAL HERNIA REPAIR ROBOTIC - W/MESH PLACEMENT - Wound Class: Clean    Surgeon(s):  KYRA Ferraro M.D.    Anesthesiologist/Type of Anesthesia:  Anesthesiologist: Eder Zarate M.D./General    Surgical Staff:  Circulator: Indira Villalobos R.N.; Franklin Edouard R.N.  Scrub Person: Td Boogie    Specimens removed if any:  * No specimens in log *    Estimated Blood Loss: 10 cc    Findings: Large direct inguinal hernia on the left    Complications: No complications were noted    Indication: Patient is a 74-year-old male who presents with a symptomatic left inguinal hernia.  The patient was counseled extensively as to the risk first benefits of surgery and agreed to proceed fully informed.    Description of the procedure:    The patient was prepped and draped in the standard sterile surgical fashion after induction of general anesthesia.  Appropriate timeout had been performed and antibiotics delivered.  A supraumbilical verres insertion was performed and the abdomen was insufflated to 15 mmHg of CO2.  The camera trocar was applied.  A right upper and left upper quadrant robotic trocar placed under direct visualization.  The robot was docked and I took my position at the console.    I began by creating a preperitoneal space on the left side of the premarked hernia.  I took this down to Lyle's ligament.  I then freed up the space lateral to the cord to make adequate space for the mesh.  I then skeletonized the cord and reducing the hernia to well within the abdomen.  I then used a 15 cm Stratus fix suture to obliterate the direct space.  Once the critical view of the myopectineal orifice was obtained, I placed a 10 x 15 cm progrip mesh.  This lay flat against the abdominal wall with excellent coverage of the defect.    I then used a 15 cm Stratisfix suture to  reapproximate the peritoneum.  This completely hid the mesh from the abdominal cavity.  Hemostasis was achieved.  The robot was then undocked and the needle withdrawn laparoscopically.  An Endo Close device was used to reapproximate the fascia of the camera trocar.  Monocryl was used for skin edges and Dermabond was applied as a dressing.  The patient was extubated, to recovery in satisfactory condition.          2/22/2019 9:46 AM Washington Alonso M.D.

## 2019-02-22 NOTE — OR NURSING
0954-Received via gurney from OR, oral airway in place.  RR even, unlabored.  Abdominal incision lines C/D/I x4 with dermabond.    1955-Arousing-oral airway out.    1005-A/A/o x4.  Denies pain.  Taking PO fluids    1015-Medicated for C/O pain    1025-Wife in to visit    1035-Dr Alonso in to check on patient    1045-DC instructions discussed & signed.  Questions answered    1050-Dressed for DC.  Up to BR-void.  Returned to chair states comfort    1109-DC via ambulation to car.  Gait steady

## 2019-02-22 NOTE — DISCHARGE INSTRUCTIONS
ACTIVITY: Rest and take it easy for the first 24 hours.  A responsible adult is recommended to remain with you during that time.  It is normal to feel sleepy.  We encourage you to not do anything that requires balance, judgment or coordination.    MILD FLU-LIKE SYMPTOMS ARE NORMAL. YOU MAY EXPERIENCE GENERALIZED MUSCLE ACHES, THROAT IRRITATION, HEADACHE AND/OR SOME NAUSEA.    FOR 24 HOURS DO NOT:  Drive, operate machinery or run household appliances.  Drink beer or alcoholic beverages.   Make important decisions or sign legal documents.    SPECIAL INSTRUCTIONS: *FOLLOW DR ALONSO'S INSTRUCTIONS**      The pain medication is extremely constipating.    Take a stool softener and drink lots of water.  It also can be addicting.  Please try to transition to an over the counter pain remedy as soon as possible.     2.   Alternating ice and heat for 30 minutes can greatly reduce your pain.     3.   It's ok to shower on the day after your surgery.   Do not scrub the incisions.     4.   After laparoscopy, it's common to have shoulder pain or pain when you take a deep breath.   If the pain radiates down your arm, call or present to the ER.     5.   Please call for redness or drainage from the wound sites that persists.     6.   Feel free to call with questions at [phone].   If you have paperwork that needs filling out, please contact us at this number.     7.   Follow up with me in 1-2 weeks for your postoperative exam.     8.   Activity restrictions vary according to the surgery.   If it hurts, don't do it.   You may begin light exercise at 7-10 days.     Washington Alonso MD FACS   [facility]    DIET: To avoid nausea, slowly advance diet as tolerated, avoiding spicy or greasy foods for the first day.  Add more substantial food to your diet according to your physician's instructions.  Babies can be fed formula or breast milk as soon as they are hungry.  INCREASE FLUIDS AND FIBER TO AVOID  CONSTIPATION.    SURGICAL DRESSING/BATHING: *MAY SHOWER, NO SUBMERGING IN WATER, I.E, TUB BATHS, HOT TUBS, OR SWIMMING UNTIL APPROVED BY DR QUIJANO**    FOLLOW-UP APPOINTMENT:  A follow-up appointment should be arranged with your doctor in *1-2 WEEKS**; call to schedule.    You should CALL YOUR PHYSICIAN if you develop:  Fever greater than 101 degrees F.  Pain not relieved by medication, or persistent nausea or vomiting.  Excessive bleeding (blood soaking through dressing) or unexpected drainage from the wound.  Extreme redness or swelling around the incision site, drainage of pus or foul smelling drainage.  Inability to urinate or empty your bladder within 8 hours.  Problems with breathing or chest pain.    You should call 911 if you develop problems with breathing or chest pain.  If you are unable to contact your doctor or surgical center, you should go to the nearest emergency room or urgent care center.  Physician's telephone #: *[phone]**    If any questions arise, call your doctor.  If your doctor is not available, please feel free to call the Surgical Center at [phone].  The Center is open Monday through Friday from 7AM to 7PM.  You can also call the [facility] HOTLINE open 24 hours/day, 7 days/week and speak to a nurse at [phone], or toll free at [phone].    A registered nurse may call you a few days after your surgery to see how you are doing after your procedure.    MEDICATIONS: Resume taking daily medication.  Take prescribed pain medication with food.  If no medication is prescribed, you may take non-aspirin pain medication if needed.  PAIN MEDICATION CAN BE VERY CONSTIPATING.  Take a stool softener or laxative such as senokot, pericolace, or milk of magnesia if needed.    Prescription given for *OXYCODONE IR**.  Last pain medication given at **OXYCODONE 10MG GIVEN AT 1015*.    If your physician has prescribed pain medication that includes Acetaminophen (Tylenol), do not take  additional Acetaminophen (Tylenol) while taking the prescribed medication.    Depression / Suicide Risk    As you are discharged from this [facility] facility, it is important to learn how to keep safe from harming yourself.    Recognize the warning signs:  · Abrupt changes in personality, positive or negative- including increase in energy   · Giving away possessions  · Change in eating patterns- significant weight changes-  positive or negative  · Change in sleeping patterns- unable to sleep or sleeping all the time   · Unwillingness or inability to communicate  · Depression  · Unusual sadness, discouragement and loneliness  · Talk of wanting to die  · Neglect of personal appearance   · Rebelliousness- reckless behavior  · Withdrawal from people/activities they love  · Confusion- inability to concentrate     If you or a loved one observes any of these behaviors or has concerns about self-harm, here's what you can do:  · Talk about it- your feelings and reasons for harming yourself  · Remove any means that you might use to hurt yourself (examples: pills, rope, extension cords, firearm)  · Get professional help from the community (Mental Health, Substance Abuse, psychological counseling)  · Do not be alone:Call your Safe Contact- someone whom you trust who will be there for you.  · Call your local CRISIS HOTLINE 083-2043 or 625-734-9213  · Call your local Children's Mobile Crisis Response Team [place] (815) 488-5004 or www.[place]  · Call the toll free National Suicide Prevention Hotlines   · National Suicide Prevention Lifeline 658-483-SKRC (7622)  · National Hope Line Network 800-SUICIDE (759-7109)

## 2019-02-22 NOTE — OR NURSING
1100- Pt able to void, sts he feels like he emptied his bladder.  Pt verbalized understanding that if you has issues voiding later or is unable to void later, he needs to call hi surgeon or go to the ER.  Pt sts he is ready to go home.    1109- IV removed, pt taken out via w/c.

## 2019-03-07 ENCOUNTER — PATIENT MESSAGE (OUTPATIENT)
Dept: MEDICAL GROUP | Age: 74
End: 2019-03-07

## 2019-03-07 DIAGNOSIS — M54.32 LEFT SIDED SCIATICA: ICD-10-CM

## 2019-03-07 DIAGNOSIS — M79.2 NERVE PAIN: ICD-10-CM

## 2019-03-08 NOTE — PATIENT COMMUNICATION
1. Caller Name: Franklin Mejía                                           Call Back Number: MyChart      Patient approves a detailed voicemail message: yes    2. SPECIFIC Action To Be Taken: Referral pending, please sign.    3. Diagnosis/Clinical Reason for Request: Nerve PAin    4. Specialty & Provider Name/Lab/Imaging Location: Pain Management     5. Is appointment scheduled for requested order/referral: no    Patient was informed they will receive a return phone call from the office ONLY if there are any questions before processing their request. Advised to call back if they haven't received a call from the referral department in 5 days.

## 2019-03-08 NOTE — TELEPHONE ENCOUNTER
From: Franklin Mejía  To: Kelli Sylvester M.D.  Sent: 3/7/2019 4:06 PM PST  Subject: Non-Urgent Medical Question    I underwent an inguinal hernia repair by Washington Alonso on 2/22/2019. The hernia has been resolved and the robotic surgery work is healing without a problem. However, I am being referred to Fidel Delatorre for a nerve block because I have a painful irritation of a nerve from either the mesh or other element of the repair. Dr. Delatorre’s  has been unresponsive after 3 days. I have been trying to raise the urgency because it is very painful when I move the leg at times doing routine things. In the event I cannot make an appointment soon or if the waiting time to an appointment seems extended, could you refer me to a pain specialist?

## 2019-03-26 ENCOUNTER — HOSPITAL ENCOUNTER (OUTPATIENT)
Dept: LAB | Facility: MEDICAL CENTER | Age: 74
End: 2019-03-26
Attending: INTERNAL MEDICINE
Payer: MEDICARE

## 2019-03-26 DIAGNOSIS — E55.9 VITAMIN D DEFICIENCY: ICD-10-CM

## 2019-03-26 DIAGNOSIS — N18.30 CKD (CHRONIC KIDNEY DISEASE) STAGE 3, GFR 30-59 ML/MIN (HCC): ICD-10-CM

## 2019-03-26 DIAGNOSIS — I10 ESSENTIAL HYPERTENSION: ICD-10-CM

## 2019-03-26 LAB
ANION GAP SERPL CALC-SCNC: 8 MMOL/L (ref 0–11.9)
APPEARANCE UR: CLEAR
BACTERIA #/AREA URNS HPF: NEGATIVE /HPF
BILIRUB UR QL STRIP.AUTO: NEGATIVE
BUN SERPL-MCNC: 20 MG/DL (ref 8–22)
CALCIUM SERPL-MCNC: 9 MG/DL (ref 8.5–10.5)
CHLORIDE SERPL-SCNC: 107 MMOL/L (ref 96–112)
CO2 SERPL-SCNC: 24 MMOL/L (ref 20–33)
COLOR UR: YELLOW
CREAT SERPL-MCNC: 1.29 MG/DL (ref 0.5–1.4)
EPI CELLS #/AREA URNS HPF: NEGATIVE /HPF
ERYTHROCYTE [DISTWIDTH] IN BLOOD BY AUTOMATED COUNT: 44 FL (ref 35.9–50)
FASTING STATUS PATIENT QL REPORTED: NORMAL
GLUCOSE SERPL-MCNC: 87 MG/DL (ref 65–99)
GLUCOSE UR STRIP.AUTO-MCNC: NEGATIVE MG/DL
HCT VFR BLD AUTO: 49.4 % (ref 42–52)
HGB BLD-MCNC: 16.7 G/DL (ref 14–18)
HYALINE CASTS #/AREA URNS LPF: ABNORMAL /LPF
KETONES UR STRIP.AUTO-MCNC: NEGATIVE MG/DL
LEUKOCYTE ESTERASE UR QL STRIP.AUTO: ABNORMAL
MCH RBC QN AUTO: 32.6 PG (ref 27–33)
MCHC RBC AUTO-ENTMCNC: 33.8 G/DL (ref 33.7–35.3)
MCV RBC AUTO: 96.3 FL (ref 81.4–97.8)
MICRO URNS: ABNORMAL
NITRITE UR QL STRIP.AUTO: NEGATIVE
PH UR STRIP.AUTO: 5.5 [PH]
PLATELET # BLD AUTO: 249 K/UL (ref 164–446)
PMV BLD AUTO: 10.8 FL (ref 9–12.9)
POTASSIUM SERPL-SCNC: 4.3 MMOL/L (ref 3.6–5.5)
PROT UR QL STRIP: NEGATIVE MG/DL
PTH-INTACT SERPL-MCNC: 37.2 PG/ML (ref 14–72)
RBC # BLD AUTO: 5.13 M/UL (ref 4.7–6.1)
RBC # URNS HPF: ABNORMAL /HPF
RBC UR QL AUTO: NEGATIVE
SODIUM SERPL-SCNC: 139 MMOL/L (ref 135–145)
SP GR UR STRIP.AUTO: 1.02
UROBILINOGEN UR STRIP.AUTO-MCNC: 0.2 MG/DL
WBC # BLD AUTO: 6.1 K/UL (ref 4.8–10.8)
WBC #/AREA URNS HPF: ABNORMAL /HPF

## 2019-03-26 PROCEDURE — 83970 ASSAY OF PARATHORMONE: CPT

## 2019-03-26 PROCEDURE — 85027 COMPLETE CBC AUTOMATED: CPT

## 2019-03-26 PROCEDURE — 80048 BASIC METABOLIC PNL TOTAL CA: CPT

## 2019-03-26 PROCEDURE — 81001 URINALYSIS AUTO W/SCOPE: CPT

## 2019-03-26 PROCEDURE — 36415 COLL VENOUS BLD VENIPUNCTURE: CPT

## 2019-04-02 ENCOUNTER — OFFICE VISIT (OUTPATIENT)
Dept: NEPHROLOGY | Facility: MEDICAL CENTER | Age: 74
End: 2019-04-02
Payer: MEDICARE

## 2019-04-02 VITALS
HEART RATE: 66 BPM | RESPIRATION RATE: 14 BRPM | TEMPERATURE: 98.8 F | HEIGHT: 71 IN | SYSTOLIC BLOOD PRESSURE: 118 MMHG | BODY MASS INDEX: 27.58 KG/M2 | WEIGHT: 197 LBS | DIASTOLIC BLOOD PRESSURE: 64 MMHG | OXYGEN SATURATION: 95 %

## 2019-04-02 DIAGNOSIS — N20.0 KIDNEY CALCULUS: ICD-10-CM

## 2019-04-02 DIAGNOSIS — I10 ESSENTIAL HYPERTENSION: ICD-10-CM

## 2019-04-02 DIAGNOSIS — I70.1 RENAL ARTERY STENOSIS (HCC): ICD-10-CM

## 2019-04-02 DIAGNOSIS — N18.30 CKD (CHRONIC KIDNEY DISEASE) STAGE 3, GFR 30-59 ML/MIN (HCC): ICD-10-CM

## 2019-04-02 DIAGNOSIS — E55.9 VITAMIN D DEFICIENCY: ICD-10-CM

## 2019-04-02 DIAGNOSIS — J30.9 CHRONIC ALLERGIC RHINITIS: ICD-10-CM

## 2019-04-02 PROCEDURE — 99214 OFFICE O/P EST MOD 30 MIN: CPT | Performed by: INTERNAL MEDICINE

## 2019-04-02 RX ORDER — LORATADINE 10 MG/1
10 TABLET ORAL DAILY
COMMUNITY
End: 2019-11-21

## 2019-04-02 RX ORDER — FLUTICASONE PROPIONATE 50 MCG
SPRAY, SUSPENSION (ML) NASAL
Qty: 16 G | Refills: 3 | Status: SHIPPED | OUTPATIENT
Start: 2019-04-02 | End: 2019-11-21 | Stop reason: SDUPTHER

## 2019-04-02 ASSESSMENT — ENCOUNTER SYMPTOMS
SPUTUM PRODUCTION: 0
PALPITATIONS: 0
VOMITING: 0
FLANK PAIN: 0
FEVER: 0
ORTHOPNEA: 0
NAUSEA: 0
COUGH: 0
HEARTBURN: 0
SHORTNESS OF BREATH: 0
ABDOMINAL PAIN: 0
DIARRHEA: 0
HEMOPTYSIS: 0
WEIGHT LOSS: 0
CHILLS: 0
WHEEZING: 0

## 2019-04-02 NOTE — PROGRESS NOTES
"Subjective:      Franklin Mejía is a 74 y.o. male who presents with Follow-Up and Chronic Kidney Disease            Chronic Kidney Disease   Pertinent negatives include no abdominal pain, chest pain, chills, coughing, fever, nausea or vomiting.     Delgado is coming today for f/u of CKD III  Baseline creat level at 1.2-1.5 - improved to 1.29  HTN: Bp well controlled with Losartan  Hx/of Nephrolithiasis: s/p lithotripsy -f/u in Urology Clinic  No dysuria/hematuria/flank pain  CT negative for kidney stone  Review of Systems   Constitutional: Negative for chills, fever, malaise/fatigue and weight loss.   Respiratory: Negative for cough, hemoptysis, sputum production, shortness of breath and wheezing.    Cardiovascular: Negative for chest pain, palpitations, orthopnea and leg swelling.   Gastrointestinal: Negative for abdominal pain, diarrhea, heartburn, nausea and vomiting.   Genitourinary: Negative for dysuria, flank pain, frequency, hematuria and urgency.   Skin: Negative.    All other systems reviewed and are negative.         Objective:     /64 (BP Location: Right arm, Patient Position: Sitting, BP Cuff Size: Adult)   Pulse 66   Temp 37.1 °C (98.8 °F) (Temporal)   Resp 14   Ht 1.803 m (5' 11\")   Wt 89.4 kg (197 lb)   SpO2 95%   BMI 27.48 kg/m²      Physical Exam   Constitutional: He appears well-developed and well-nourished. No distress.   HENT:   Head: Normocephalic and atraumatic.   Nose: Nose normal.   Mouth/Throat: Oropharynx is clear and moist.   Eyes: Pupils are equal, round, and reactive to light. Conjunctivae and EOM are normal.   Neck: Normal range of motion. Neck supple. No thyromegaly present.   Cardiovascular: Normal rate and regular rhythm.  Exam reveals no gallop and no friction rub.    Pulmonary/Chest: Effort normal and breath sounds normal. No respiratory distress. He has no wheezes. He has no rales.   Abdominal: Soft. Bowel sounds are normal. He exhibits no distension. There is no " tenderness.   Musculoskeletal: He exhibits no edema.   Neurological: He is alert. No cranial nerve deficit. Coordination normal.   Skin: Skin is warm. No rash noted. No erythema.   Vitals reviewed.         Laboratory and renal US results reviewed: d/w Pt  Lab Results   Component Value Date/Time    CREATININE 1.29 03/26/2019 06:09 AM    POTASSIUM 4.3 03/26/2019 06:09 AM          Assessment/Plan:     1. CKD (chronic kidney disease) stage 3, GFR 30-59 ml/min ([facility])      Stable at baseline -to monitor      2. Essential hypertension      BP well controlled      3. Renal artery stenosis (HCC), right      Not significant -BP well controlled with one agent -to monitor    4. Kidney calculus      None per CT    5. Vitamin D deficiency        PTH WNL    Recs: monitor BP, low Na diet, avoid NSAID's             F/u in 6 months

## 2019-04-05 DIAGNOSIS — I10 ESSENTIAL HYPERTENSION: ICD-10-CM

## 2019-04-05 DIAGNOSIS — E78.2 MIXED HYPERLIPIDEMIA: ICD-10-CM

## 2019-04-05 RX ORDER — LOVASTATIN 10 MG/1
10 TABLET ORAL EVERY EVENING
Qty: 90 TAB | Refills: 3 | Status: SHIPPED | OUTPATIENT
Start: 2019-04-05 | End: 2019-04-10 | Stop reason: SDUPTHER

## 2019-04-05 RX ORDER — LOSARTAN POTASSIUM 100 MG/1
100 TABLET ORAL DAILY
Qty: 90 TAB | Refills: 3 | Status: SHIPPED | OUTPATIENT
Start: 2019-04-05 | End: 2019-04-10 | Stop reason: SDUPTHER

## 2019-04-05 NOTE — TELEPHONE ENCOUNTER
Was the patient seen in the last year in this department? Yes    Does patient have an active prescription for medications requested? No     Received Request Via: Pharmacy       Pharmacy is requesting 100 day supply.

## 2019-04-10 ENCOUNTER — TELEPHONE (OUTPATIENT)
Dept: MEDICAL GROUP | Age: 74
End: 2019-04-10

## 2019-04-10 DIAGNOSIS — I10 ESSENTIAL HYPERTENSION: ICD-10-CM

## 2019-04-10 DIAGNOSIS — E78.2 MIXED HYPERLIPIDEMIA: ICD-10-CM

## 2019-04-10 RX ORDER — LOSARTAN POTASSIUM 100 MG/1
100 TABLET ORAL DAILY
Qty: 90 TAB | Refills: 3 | Status: SHIPPED | OUTPATIENT
Start: 2019-04-10 | End: 2019-07-02 | Stop reason: SDUPTHER

## 2019-04-10 RX ORDER — LOVASTATIN 10 MG/1
10 TABLET ORAL EVERY EVENING
Qty: 90 TAB | Refills: 3 | Status: SHIPPED | OUTPATIENT
Start: 2019-04-10 | End: 2019-07-05 | Stop reason: SDUPTHER

## 2019-04-10 NOTE — TELEPHONE ENCOUNTER
I resent losartan and lovastatin to [facility] mail order pharmacy today.    Kelli Sylvester M.D.

## 2019-04-10 NOTE — TELEPHONE ENCOUNTER
Was the patient seen in the last year in this department? Yes    Does patient have an active prescription for medications requested? Yes    Received Request Via: Pharmacy     Received vm from [facility] requesting Rx for Losartan. Verified Rx was sent electronically to [facility] on 04/05/19 & not picked up. Called Pt [phone] (home) and confirmed he wants the Lorsartan through [facility]. Per request updated his 1st pharmacy choice to [facility]. Jori

## 2019-05-06 ENCOUNTER — OFFICE VISIT (OUTPATIENT)
Dept: URGENT CARE | Facility: CLINIC | Age: 74
End: 2019-05-06
Payer: MEDICARE

## 2019-05-06 VITALS
SYSTOLIC BLOOD PRESSURE: 108 MMHG | TEMPERATURE: 98.6 F | RESPIRATION RATE: 18 BRPM | BODY MASS INDEX: 26.6 KG/M2 | HEIGHT: 71 IN | OXYGEN SATURATION: 93 % | DIASTOLIC BLOOD PRESSURE: 60 MMHG | WEIGHT: 190 LBS | HEART RATE: 94 BPM

## 2019-05-06 DIAGNOSIS — J01.00 ACUTE NON-RECURRENT MAXILLARY SINUSITIS: ICD-10-CM

## 2019-05-06 PROCEDURE — 99214 OFFICE O/P EST MOD 30 MIN: CPT | Performed by: FAMILY MEDICINE

## 2019-05-06 RX ORDER — AMOXICILLIN AND CLAVULANATE POTASSIUM 875; 125 MG/1; MG/1
1 TABLET, FILM COATED ORAL 2 TIMES DAILY
Qty: 14 TAB | Refills: 0 | Status: SHIPPED | OUTPATIENT
Start: 2019-05-06 | End: 2019-05-13

## 2019-05-06 ASSESSMENT — ENCOUNTER SYMPTOMS
HEADACHES: 1
FEVER: 0
SINUS PAIN: 1

## 2019-05-06 NOTE — PROGRESS NOTES
"Subjective:   Franklin Nails a 74 y.o. male who presents for Sinus Pain (x2weeks, sinus pain, congestion, dark mucus, headache, pressure)    Sinus Pain   This is a new problem. The current episode started 1 to 4 weeks ago. The problem occurs constantly. The problem has been rapidly worsening. Associated symptoms include congestion and headaches. Pertinent negatives include no fever.     Review of Systems   Constitutional: Negative for fever.   HENT: Positive for congestion and sinus pain.    Neurological: Positive for headaches.     Allergies   Allergen Reactions   • Flu Virus Vaccine Rash     Rash on both arms,   • Prilosec [Omeprazole] Rash     Mild rash      Objective:   /60 (BP Location: Left arm, Patient Position: Sitting, BP Cuff Size: Adult)   Pulse 94   Temp 37 °C (98.6 °F) (Temporal)   Resp 18   Ht 1.803 m (5' 11\")   Wt 86.2 kg (190 lb)   SpO2 93%   BMI 26.50 kg/m²   Physical Exam   Constitutional: He is oriented to person, place, and time. He appears well-developed and well-nourished. No distress.   HENT:   Head: Normocephalic and atraumatic.   Nose: Mucosal edema and rhinorrhea present. Right sinus exhibits maxillary sinus tenderness. Left sinus exhibits maxillary sinus tenderness.   Eyes: Pupils are equal, round, and reactive to light. Conjunctivae and EOM are normal.   Cardiovascular: Normal rate and regular rhythm.    No murmur heard.  Pulmonary/Chest: Effort normal and breath sounds normal. No respiratory distress.   Abdominal: Soft. He exhibits no distension. There is no tenderness.   Neurological: He is alert and oriented to person, place, and time. He has normal reflexes. No sensory deficit.   Skin: Skin is warm and dry.   Psychiatric: He has a normal mood and affect.   Vitals reviewed.    Assessment/Plan:   Assessment    1. Acute non-recurrent maxillary sinusitis  - amoxicillin-clavulanate (AUGMENTIN) 875-125 MG Tab; Take 1 Tab by mouth 2 times a day for 7 days.  Dispense: 14 " Tab; Refill: 0    Differential diagnosis, natural history, supportive care, and indications for immediate follow-up discussed.  Return if symptoms worsen or fail to improve.

## 2019-05-06 NOTE — PATIENT INSTRUCTIONS

## 2019-06-03 ENCOUNTER — PATIENT OUTREACH (OUTPATIENT)
Dept: HEALTH INFORMATION MANAGEMENT | Facility: OTHER | Age: 74
End: 2019-06-03

## 2019-07-02 DIAGNOSIS — I10 ESSENTIAL HYPERTENSION: ICD-10-CM

## 2019-07-02 RX ORDER — LOSARTAN POTASSIUM 100 MG/1
100 TABLET ORAL DAILY
Qty: 100 TAB | Refills: 3 | Status: SHIPPED | OUTPATIENT
Start: 2019-07-02 | End: 2019-11-21 | Stop reason: SDUPTHER

## 2019-07-05 ENCOUNTER — HOSPITAL ENCOUNTER (OUTPATIENT)
Dept: LAB | Facility: MEDICAL CENTER | Age: 74
End: 2019-07-05
Attending: INTERNAL MEDICINE
Payer: MEDICARE

## 2019-07-05 DIAGNOSIS — E78.2 MIXED HYPERLIPIDEMIA: ICD-10-CM

## 2019-07-05 LAB
ALBUMIN SERPL BCP-MCNC: 4 G/DL (ref 3.2–4.9)
ALBUMIN/GLOB SERPL: 1.5 G/DL
ALP SERPL-CCNC: 46 U/L (ref 30–99)
ALT SERPL-CCNC: 21 U/L (ref 2–50)
ANION GAP SERPL CALC-SCNC: 11 MMOL/L (ref 0–11.9)
AST SERPL-CCNC: 18 U/L (ref 12–45)
BILIRUB SERPL-MCNC: 0.7 MG/DL (ref 0.1–1.5)
BUN SERPL-MCNC: 22 MG/DL (ref 8–22)
CALCIUM SERPL-MCNC: 9.1 MG/DL (ref 8.5–10.5)
CHLORIDE SERPL-SCNC: 109 MMOL/L (ref 96–112)
CHOLEST SERPL-MCNC: 145 MG/DL (ref 100–199)
CO2 SERPL-SCNC: 22 MMOL/L (ref 20–33)
CREAT SERPL-MCNC: 1.27 MG/DL (ref 0.5–1.4)
FASTING STATUS PATIENT QL REPORTED: NORMAL
GLOBULIN SER CALC-MCNC: 2.7 G/DL (ref 1.9–3.5)
GLUCOSE SERPL-MCNC: 91 MG/DL (ref 65–99)
HDLC SERPL-MCNC: 42 MG/DL
LDLC SERPL CALC-MCNC: 72 MG/DL
POTASSIUM SERPL-SCNC: 4.4 MMOL/L (ref 3.6–5.5)
PROT SERPL-MCNC: 6.7 G/DL (ref 6–8.2)
SODIUM SERPL-SCNC: 142 MMOL/L (ref 135–145)
TRIGL SERPL-MCNC: 154 MG/DL (ref 0–149)

## 2019-07-05 PROCEDURE — 80061 LIPID PANEL: CPT

## 2019-07-05 PROCEDURE — 80053 COMPREHEN METABOLIC PANEL: CPT

## 2019-07-05 PROCEDURE — 36415 COLL VENOUS BLD VENIPUNCTURE: CPT

## 2019-07-05 NOTE — TELEPHONE ENCOUNTER
Was the patient seen in the last year in this department? Yes    Does patient have an active prescription for medications requested? Yes    Received Request Via: Pharmacy     Pharmacy requesting 100 day supply

## 2019-07-06 RX ORDER — LOVASTATIN 10 MG/1
10 TABLET ORAL EVERY EVENING
Qty: 100 TAB | Refills: 3 | Status: SHIPPED | OUTPATIENT
Start: 2019-07-06 | End: 2019-11-21 | Stop reason: SDUPTHER

## 2019-07-09 ENCOUNTER — TELEPHONE (OUTPATIENT)
Dept: MEDICAL GROUP | Age: 74
End: 2019-07-09

## 2019-07-09 NOTE — TELEPHONE ENCOUNTER
ESTABLISHED PATIENT PRE-VISIT PLANNING     Patient was NOT contacted to complete PVP.     Note: Patient will not be contacted if there is no indication to call.     1.  Reviewed notes from the last few office visits within the medical group: Yes    2.  If any orders were placed at last visit or intended to be done for this visit (i.e. 6 mos follow-up), do we have Results/Consult Notes?        •  Labs - Labs ordered, completed on 7/5/19 and results are in chart.   Note: If patient appointment is for lab review and patient did not complete labs, check with provider if OK to reschedule patient until labs completed.       •  Imaging - Imaging was not ordered at last office visit.       •  Referrals - No referrals were ordered at last office visit.    3. Is this appointment scheduled as a Hospital Follow-Up? No    4.  Immunizations were updated in Epic using WebIZ?: Epic matches WebIZ       •  Web Iz Recommendations: SHINGRIX (Shingles)    5.  Patient is due for the following Health Maintenance Topics:   Health Maintenance Due   Topic Date Due   • HEPATITIS C SCREENING  1945   • IMM ZOSTER VACCINES (1 of 2) 01/12/1997   • Annual Wellness Visit  01/24/2019           6. Orders for overdue Health Maintenance topics pended in Pre-Charting? N\A    7.  AHA (MDX) form printed for Provider? No, already completed    8.  Patient was NOT informed to arrive 15 min prior to their scheduled appointment and bring in their medication bottles.

## 2019-07-10 ENCOUNTER — OFFICE VISIT (OUTPATIENT)
Dept: MEDICAL GROUP | Age: 74
End: 2019-07-10
Payer: MEDICARE

## 2019-07-10 VITALS
HEIGHT: 71 IN | OXYGEN SATURATION: 94 % | WEIGHT: 187.8 LBS | BODY MASS INDEX: 26.29 KG/M2 | DIASTOLIC BLOOD PRESSURE: 66 MMHG | HEART RATE: 72 BPM | TEMPERATURE: 98.4 F | SYSTOLIC BLOOD PRESSURE: 108 MMHG

## 2019-07-10 DIAGNOSIS — I10 ESSENTIAL HYPERTENSION: ICD-10-CM

## 2019-07-10 DIAGNOSIS — E78.2 MIXED HYPERLIPIDEMIA: ICD-10-CM

## 2019-07-10 DIAGNOSIS — Z11.59 NEED FOR HEPATITIS C SCREENING TEST: ICD-10-CM

## 2019-07-10 DIAGNOSIS — N18.30 CKD (CHRONIC KIDNEY DISEASE) STAGE 3, GFR 30-59 ML/MIN (HCC): ICD-10-CM

## 2019-07-10 PROCEDURE — 99214 OFFICE O/P EST MOD 30 MIN: CPT | Performed by: INTERNAL MEDICINE

## 2019-07-10 ASSESSMENT — PAIN SCALES - GENERAL: PAINLEVEL: NO PAIN

## 2019-07-10 ASSESSMENT — ENCOUNTER SYMPTOMS: GENERAL WELL-BEING: GOOD

## 2019-07-10 ASSESSMENT — ACTIVITIES OF DAILY LIVING (ADL): BATHING_REQUIRES_ASSISTANCE: 0

## 2019-07-10 ASSESSMENT — PATIENT HEALTH QUESTIONNAIRE - PHQ9: CLINICAL INTERPRETATION OF PHQ2 SCORE: 0

## 2019-07-10 NOTE — PROGRESS NOTES
Subjective:   Delia Mejía is a 74 y.o. male here today for evaluation and management of:    Essential hypertension  Patient is taking losartan 100 mg daily. His blood pressure is stable at home around 130-140 in systole and is 108/66 in the office today. He denies any side effects.    Mixed hyperlipidemia  Patient is taking lovastatin 10 mg every evening and omega-3 twice daily. He denies any side effects. His triglycerides are elevated, but otherwise his lipid panel is within normal limits. I discussed the blood test with the patient in clinic today.    Results for DELIA MEJÍA (MRN [id number]) as of 7/10/2019 14:32   Ref. Range 7/5/2019 06:06   Cholesterol,Tot Latest Ref Range: 100 - 199 mg/dL 145   Triglycerides Latest Ref Range: 0 - 149 mg/dL 154 (H)   HDL Latest Ref Range: >=40 mg/dL 42   LDL Latest Ref Range: <100 mg/dL 72     CKD (chronic kidney disease) stage 3, GFR 30-59 ml/min (HCC)  His creatinine was still elevated in February of this year, but has gradually improved since then and was 1.27 on most recent labs. His estimated GFR has been stable. His most recent GFR was 56. I discussed the blood test with the patient in clinic today. He denies hematuria, kidney stone, urinary retention, or urinary urgency.  He is not taking over-the-counter NSAIDs.    Current medicines (including changes today)  Current Outpatient Prescriptions   Medication Sig Dispense Refill   • lovastatin (MEVACOR) 10 MG tablet Take 1 Tab by mouth every evening. 100 Tab 3   • losartan (COZAAR) 100 MG Tab Take 1 Tab by mouth every day. 100 Tab 3   • loratadine (KLS ALLERCLEAR) 10 MG Tab Take 10 mg by mouth every day.     • fluticasone (FLONASE) 50 MCG/ACT nasal spray USE 2 SPRAYS IN NOSE EVERY DAY 16 g 3   • coenzyme Q-10 30 MG capsule Take 60 mg by mouth every day.     • azelastine (ASTELIN) 137 MCG/SPRAY nasal spray [place] 1 Spray in nose 2 times a day. 30 mL 3   • Omega-3 1000 MG Cap Take  by mouth 2 Times a Day.     •  "Multiple Vitamin (MULTI VITAMIN PO) Take 1 Tab by mouth every day.     • Cyanocobalamin (VITAMIN B-12) 5000 MCG SL Tab Place 1 Tab under tongue every day.     • Cholecalciferol (VITAMIN D3) 5000 UNITS Cap Take 1 Cap by mouth every day.     • tamsulosin (FLOMAX) 0.4 MG capsule Take 1 Cap by mouth ONE-HALF HOUR AFTER BREAKFAST. 30 Cap 0     No current facility-administered medications for this visit.      He  has a past medical history of Hyperlipidemia; Hypertension; and Kidney stone.    ROS   No chest pain, no shortness of breath, no abdominal pain. Denies hematuria, kidney stone, urinary retention, or urinary urgency       Objective:     /66 (BP Location: Left arm, Patient Position: Sitting, BP Cuff Size: Adult)   Pulse 72   Temp 36.9 °C (98.4 °F) (Temporal)   Ht 1.803 m (5' 11\")   Wt 85.2 kg (187 lb 12.8 oz)   SpO2 94%  Body mass index is 26.19 kg/m².   Physical Exam:  General: Alert, oriented and no acute distress.  Eye contact is good, speech goal directed, affect calm  HEENT: conjunctiva non-injected, sclera non-icteric.  Oral mucous membranes pink and moist with no lesions.  Pinna normal.   Lungs: Normal respiratory effort, clear to auscultation bilaterally with good excursion.  CV: regular rate and rhythm. No murmurs. No carotid bruits.  Abdomen: soft, non distended, nontender, No CVAT, Bowel sound normal.  Ext: no edema, color normal, vascularity normal, temperature normal      Assessment and Plan:   The following treatment plan was discussed     1. Essential hypertension  - Well-controlled. Continue current regimens, losartan 100 mg daily. Recheck lab 1-2 weeks before next follow up visit.  - Reviewed the risks and benefits of treatment and potential side effects of medication.  - Advised to eat low fat, low carbohydrate and high fiber diet as well as do cardio physical exercise regularly.  - CBC WITH DIFFERENTIAL; Future  - Comp Metabolic Panel; Future    2. Mixed hyperlipidemia  -  " Triglycerides are elevated, but otherwise his lipid panel is within normal limits. Continue current regimens, lovastatin 10 mg every evening. Recheck lab 1-2 weeks before next follow up visit.  - Reviewed the risks and benefits as well as potential side effects of medications with patient.  - Discussed to eat low-sodium diet and encouraged to do regular physical exercise.  - Recommend to monitor blood pressure and heart rate at home.  - Comp Metabolic Panel; Future  - Lipid Profile; Future    3. CKD (chronic kidney disease) stage 3, GFR 30-59 ml/min (HCC)  - Stable.  Continue losartan 100 mg daily.  Continue to avoid nephrotoxic agents. Recheck lab 1-2 weeks before next follow up visit.  - Patient follows with nephrologist regularly.  - Comp Metabolic Panel; Future    4. Need for hepatitis C screening test  - He understands that his age group is at risk for hepatitis C and I would like to order a hepatitis C screening.  - HEP C VIRUS ANTIBODY; Future      Followup: Return in about 6 months (around 1/10/2020), or if symptoms worsen or fail to improve, for Hypertension, Hyperlipidemia, BPH, CKD, Lab review.      Please note that this dictation was created using voice recognition software. I have made every reasonable attempt to correct obvious errors, but I expect that there may have unintended errors in text, spelling, punctuation, or grammar that I did not discover.    I, Viet Del Valle (Scribe), am scribing for, and in the presence of, Kelli Sylvester M.D..    Electronically signed by: Viet Del Valle (Scribe), 7/10/2019    I, Kelli Sylvester M.D., personally performed the services described in this documentation, as scribed by Viet Del Valle in my presence, and it is both accurate and complete.

## 2019-10-24 ENCOUNTER — HOSPITAL ENCOUNTER (OUTPATIENT)
Dept: LAB | Facility: MEDICAL CENTER | Age: 74
End: 2019-10-24
Attending: INTERNAL MEDICINE
Payer: MEDICARE

## 2019-10-24 DIAGNOSIS — N18.30 CKD (CHRONIC KIDNEY DISEASE) STAGE 3, GFR 30-59 ML/MIN (HCC): ICD-10-CM

## 2019-10-24 LAB
ANION GAP SERPL CALC-SCNC: 8 MMOL/L (ref 0–11.9)
BUN SERPL-MCNC: 19 MG/DL (ref 8–22)
CALCIUM SERPL-MCNC: 8.9 MG/DL (ref 8.5–10.5)
CHLORIDE SERPL-SCNC: 108 MMOL/L (ref 96–112)
CO2 SERPL-SCNC: 26 MMOL/L (ref 20–33)
CREAT SERPL-MCNC: 1.42 MG/DL (ref 0.5–1.4)
GLUCOSE SERPL-MCNC: 93 MG/DL (ref 65–99)
POTASSIUM SERPL-SCNC: 4.3 MMOL/L (ref 3.6–5.5)
SODIUM SERPL-SCNC: 142 MMOL/L (ref 135–145)

## 2019-10-24 PROCEDURE — 36415 COLL VENOUS BLD VENIPUNCTURE: CPT

## 2019-10-24 PROCEDURE — 80048 BASIC METABOLIC PNL TOTAL CA: CPT

## 2019-10-31 ENCOUNTER — OFFICE VISIT (OUTPATIENT)
Dept: NEPHROLOGY | Facility: MEDICAL CENTER | Age: 74
End: 2019-10-31
Payer: MEDICARE

## 2019-10-31 VITALS
BODY MASS INDEX: 26.6 KG/M2 | HEIGHT: 71 IN | HEART RATE: 76 BPM | RESPIRATION RATE: 16 BRPM | DIASTOLIC BLOOD PRESSURE: 70 MMHG | TEMPERATURE: 98.2 F | OXYGEN SATURATION: 94 % | SYSTOLIC BLOOD PRESSURE: 128 MMHG | WEIGHT: 190 LBS

## 2019-10-31 DIAGNOSIS — E55.9 VITAMIN D DEFICIENCY: ICD-10-CM

## 2019-10-31 DIAGNOSIS — R80.9 MICROALBUMINURIA: ICD-10-CM

## 2019-10-31 DIAGNOSIS — N20.0 KIDNEY CALCULUS: ICD-10-CM

## 2019-10-31 DIAGNOSIS — N18.30 CKD (CHRONIC KIDNEY DISEASE) STAGE 3, GFR 30-59 ML/MIN (HCC): ICD-10-CM

## 2019-10-31 DIAGNOSIS — I10 ESSENTIAL HYPERTENSION: ICD-10-CM

## 2019-10-31 PROCEDURE — 99214 OFFICE O/P EST MOD 30 MIN: CPT | Performed by: INTERNAL MEDICINE

## 2019-10-31 ASSESSMENT — ENCOUNTER SYMPTOMS
CHILLS: 0
HEMOPTYSIS: 0
DIARRHEA: 0
EYES NEGATIVE: 1
FEVER: 0
WEIGHT LOSS: 0
SHORTNESS OF BREATH: 0
ABDOMINAL PAIN: 0
COUGH: 0
VOMITING: 0
NAUSEA: 0
WHEEZING: 0
ORTHOPNEA: 0
SINUS PAIN: 0
PALPITATIONS: 0
FLANK PAIN: 0

## 2019-10-31 NOTE — PROGRESS NOTES
Subjective:      Franklin Mejía is a 74 y.o. male who presents with Follow-Up and Chronic Kidney Disease            Chronic Kidney Disease   Pertinent negatives include no abdominal pain, chest pain, chills, congestion, coughing, fever, nausea or vomiting.     Delgado is coming today for f/u of CKD III  Baseline creat level at 1.2-1.5 - stable  HTN: BP under excellent control -compliant to medications , low Na diet  Hx/of Nephrolithiasis: s/p lithotripsy -f/u in Urology Clinic  No dysuria/hematuria/flank pain  CT negative for kidney stone    Review of Systems   Constitutional: Negative for chills, fever, malaise/fatigue and weight loss.   HENT: Negative for congestion, hearing loss and sinus pain.    Eyes: Negative.    Respiratory: Negative for cough, hemoptysis, shortness of breath and wheezing.    Cardiovascular: Negative for chest pain, palpitations, orthopnea and leg swelling.   Gastrointestinal: Negative for abdominal pain, diarrhea, nausea and vomiting.   Genitourinary: Negative for dysuria, flank pain, frequency, hematuria and urgency.   Skin: Negative.    All other systems reviewed and are negative.    Past Medical History:   Diagnosis Date   • Hyperlipidemia    • Hypertension     pt states well controlled on meds   • Kidney stone     stones       Family History   Problem Relation Age of Onset   • Cancer Mother         colon cancer   • Heart Disease Brother    • No Known Problems Maternal Grandmother    • Hypertension Maternal Grandfather        Social History     Socioeconomic History   • Marital status:      Spouse name: Not on file   • Number of children: Not on file   • Years of education: Not on file   • Highest education level: Not on file   Occupational History   • Not on file   Social Needs   • Financial resource strain: Not on file   • Food insecurity:     Worry: Not on file     Inability: Not on file   • Transportation needs:     Medical: Not on file     Non-medical: Not on file  "  Tobacco Use   • Smoking status: Former Smoker     Packs/day: 1.00     Years: 15.00     Pack years: 15.00     Types: Cigarettes     Last attempt to quit: 1987     Years since quittin.8   • Smokeless tobacco: Never Used   Substance and Sexual Activity   • Alcohol use: Yes     Alcohol/week: 1.2 oz     Types: 2 Shots of liquor per week     Comment: 3-4 per week   • Drug use: No   • Sexual activity: Yes     Partners: Female   Lifestyle   • Physical activity:     Days per week: Not on file     Minutes per session: Not on file   • Stress: Not on file   Relationships   • Social connections:     Talks on phone: Not on file     Gets together: Not on file     Attends [employer] service: Not on file     Active member of club or organization: Not on file     Attends meetings of clubs or organizations: Not on file     Relationship status: Not on file   • Intimate partner violence:     Fear of current or ex partner: Not on file     Emotionally abused: Not on file     Physically abused: Not on file     Forced sexual activity: Not on file   Other Topics Concern   • Not on file   Social History Narrative   • Not on file          Objective:     /70 (BP Location: Right arm, Patient Position: Sitting)   Pulse 76   Temp 36.8 °C (98.2 °F)   Resp 16   Ht 1.803 m (5' 11\")   Wt 86.2 kg (190 lb)   SpO2 94%   BMI 26.50 kg/m²      Physical Exam   Constitutional: He is oriented to person, place, and time. He appears well-developed and well-nourished. No distress.   HENT:   Head: Normocephalic and atraumatic.   Nose: Nose normal.   Mouth/Throat: Oropharynx is clear and moist.   Eyes: Pupils are equal, round, and reactive to light. Conjunctivae and EOM are normal. No scleral icterus.   Neck: Normal range of motion. Neck supple. No thyromegaly present.   Cardiovascular: Normal rate and regular rhythm. Exam reveals no gallop and no friction rub.   Pulmonary/Chest: Effort normal and breath sounds normal. No respiratory " distress. He has no wheezes. He has no rales.   Abdominal: Soft. Bowel sounds are normal. He exhibits no distension and no mass. There is no tenderness. There is no guarding.   Musculoskeletal: He exhibits no edema.   Neurological: He is alert and oriented to person, place, and time. No cranial nerve deficit. Coordination normal.   Skin: Skin is warm. No rash noted. He is not diaphoretic. No erythema.          Laboratory and renal US results reviewed: d/w Pt  Lab Results   Component Value Date/Time    CREATININE 1.42 (H) 10/24/2019 08:12 AM    POTASSIUM 4.3 10/24/2019 08:12 AM          Assessment/Plan:     1. CKD (chronic kidney disease) stage 3, GFR 30-59 ml/min ([facility])      Stable at baseline -to monitor      2. Essential hypertension      BP well controlled      3. Renal artery stenosis (HCC), right      Not significant -BP well controlled    4. Kidney calculus      Negative per CT    5. Vitamin D deficiency        PTH WNL    Recs: monitor BP, low Na diet, avoid NSAID's             F/u in 6 months

## 2019-11-21 ENCOUNTER — OFFICE VISIT (OUTPATIENT)
Dept: MEDICAL GROUP | Facility: MEDICAL CENTER | Age: 74
End: 2019-11-21
Payer: MEDICARE

## 2019-11-21 VITALS
HEART RATE: 86 BPM | HEIGHT: 71 IN | DIASTOLIC BLOOD PRESSURE: 66 MMHG | WEIGHT: 194 LBS | TEMPERATURE: 97.4 F | OXYGEN SATURATION: 94 % | SYSTOLIC BLOOD PRESSURE: 136 MMHG | BODY MASS INDEX: 27.16 KG/M2

## 2019-11-21 DIAGNOSIS — Z12.5 SCREENING PSA (PROSTATE SPECIFIC ANTIGEN): ICD-10-CM

## 2019-11-21 DIAGNOSIS — N18.30 CKD (CHRONIC KIDNEY DISEASE) STAGE 3, GFR 30-59 ML/MIN (HCC): ICD-10-CM

## 2019-11-21 DIAGNOSIS — Z11.59 NEED FOR HEPATITIS C SCREENING TEST: ICD-10-CM

## 2019-11-21 DIAGNOSIS — J30.89 CHRONIC NONSEASONAL ALLERGIC RHINITIS DUE TO POLLEN: ICD-10-CM

## 2019-11-21 DIAGNOSIS — N40.1 BPH WITH OBSTRUCTION/LOWER URINARY TRACT SYMPTOMS: ICD-10-CM

## 2019-11-21 DIAGNOSIS — J30.9 CHRONIC ALLERGIC RHINITIS: ICD-10-CM

## 2019-11-21 DIAGNOSIS — E78.5 DYSLIPIDEMIA: ICD-10-CM

## 2019-11-21 DIAGNOSIS — I70.1 RENAL ARTERY STENOSIS (HCC): ICD-10-CM

## 2019-11-21 DIAGNOSIS — N13.8 BPH WITH OBSTRUCTION/LOWER URINARY TRACT SYMPTOMS: ICD-10-CM

## 2019-11-21 DIAGNOSIS — Z00.00 MEDICARE ANNUAL WELLNESS VISIT, SUBSEQUENT: ICD-10-CM

## 2019-11-21 DIAGNOSIS — I10 ESSENTIAL HYPERTENSION: ICD-10-CM

## 2019-11-21 PROCEDURE — 8041 PR SCP AHA: Performed by: INTERNAL MEDICINE

## 2019-11-21 PROCEDURE — G0439 PPPS, SUBSEQ VISIT: HCPCS | Performed by: INTERNAL MEDICINE

## 2019-11-21 PROCEDURE — 99214 OFFICE O/P EST MOD 30 MIN: CPT | Mod: 25 | Performed by: INTERNAL MEDICINE

## 2019-11-21 RX ORDER — LOSARTAN POTASSIUM 100 MG/1
100 TABLET ORAL DAILY
Qty: 100 TAB | Refills: 3 | Status: SHIPPED | OUTPATIENT
Start: 2019-11-21 | End: 2021-02-05 | Stop reason: SDUPTHER

## 2019-11-21 RX ORDER — LOVASTATIN 10 MG/1
10 TABLET ORAL EVERY EVENING
Qty: 100 TAB | Refills: 3 | Status: SHIPPED | OUTPATIENT
Start: 2019-11-21 | End: 2020-08-11

## 2019-11-21 RX ORDER — TAMSULOSIN HYDROCHLORIDE 0.4 MG/1
0.4 CAPSULE ORAL
Qty: 100 CAP | Refills: 3 | Status: SHIPPED | OUTPATIENT
Start: 2019-11-21 | End: 2024-01-03 | Stop reason: SDUPTHER

## 2019-11-21 RX ORDER — FLUTICASONE PROPIONATE 50 MCG
2 SPRAY, SUSPENSION (ML) NASAL DAILY
Qty: 16 G | Refills: 3 | Status: SHIPPED | OUTPATIENT
Start: 2019-11-21 | End: 2020-05-20 | Stop reason: SDUPTHER

## 2019-11-21 RX ORDER — AZELASTINE 1 MG/ML
1 SPRAY, METERED NASAL 2 TIMES DAILY
Qty: 30 ML | Refills: 3 | Status: SHIPPED
Start: 2019-11-21 | End: 2020-10-12

## 2019-11-21 ASSESSMENT — PATIENT HEALTH QUESTIONNAIRE - PHQ9: CLINICAL INTERPRETATION OF PHQ2 SCORE: 0

## 2019-11-21 ASSESSMENT — ACTIVITIES OF DAILY LIVING (ADL): BATHING_REQUIRES_ASSISTANCE: 0

## 2019-11-21 ASSESSMENT — ENCOUNTER SYMPTOMS: GENERAL WELL-BEING: GOOD

## 2019-11-21 NOTE — PROGRESS NOTES
Annual Health Assessment Questions:    1.  Are you currently engaging in any exercise or physical activity? yes    2.  How would you describe your mood or emotional well-being today? good    3.  Have you had any falls in the last year? No    4.  Have you noticed any problems with your balance or had difficulty walking? No    5.  In the last six months have you experienced any leakage of urine? No    6. DPA/Advanced Directive: Patient has Advanced Directive, but it is not on file. Instructed to bring in a copy to scan into their chart.      CC: Establishing care hypertension, dyslipidemia and kidney disease due for wellness examination.    HPI:   Franklin presents today with the following.      1. Medicare annual wellness visit, subsequent  Patient has advanced directives at home will bring in a copy.    2. Essential hypertension  Slightly elevated in office today at home readings are slightly high as well.  He is maintained on losartan and compliant with medications.  Denies any side effects to meds plans on exercising change his diet.  He would like to monitor for a while before increasing therapy.    3. Dyslipidemia  Maintain on statin without myalgia well-controlled as of last check 6 months ago.    4. CKD (chronic kidney disease) stage 3, GFR 30-59 ml/min ([facility])  Any function has been stable he does see nephrology.    5. BPH with obstruction/lower urinary tract symptoms  Maintain on medications followed by urology.    6. Renal artery stenosis (HCC), right  3 of kidney stones as well currently doing well.  Denying any edema      Information for advance directives given to patient or instructed to bring in advance directives into to office to put in chart.      Depression Screening    Little interest or pleasure in doing things?  0 - not at all  Feeling down, depressed , or hopeless? 0 - not at all  Patient Health Questionnaire Score: 0     If depressive symptoms identified deferred to follow up visit unless  specifically addressed in assessment and plan.    Interpretation of PHQ-9 Total Score   Score Severity   1-4 No Depression   5-9 Mild Depression   10-14 Moderate Depression   15-19 Moderately Severe Depression   20-27 Severe Depression    Screening for Cognitive Impairment    Three Minute Recall (village, kitchen, baby) 2/3    Santhosh clock face with all 12 numbers and set the hands to show 10 past 10.  Yes    Cognitive concerns identified deferred for follow up unless specifically addressed in assessment and plan.    Fall Risk Assessment    Has the patient had two or more falls in the last year or any fall with injury in the last year?  No    Safety Assessment    Throw rugs on floor.  No  Handrails on all stairs.  Yes  Good lighting in all hallways.  Yes  Difficulty hearing.  Yes  Patient counseled about all safety risks that were identified.    Functional Assessment ADLs    Are there any barriers preventing you from cooking for yourself or meeting nutritional needs?  No.    Are there any barriers preventing you from driving safely or obtaining transportation?  No.    Are there any barriers preventing you from using a telephone or calling for help?  No.    Are there any barriers preventing you from shopping?  No.    Are there any barriers preventing you from taking care of your own finances?  No.    Are there any barriers preventing you from managing your medications?  No.    Are there any barriers preventing you from showering, bathing or dressing yourself?  No.    Are you currently engaging in any exercise or physical activity?  Yes.     What is your perception of your health?  Good.      Health Maintenance Summary                HEPATITIS C SCREENING Overdue 1945     Annual Wellness Visit Overdue 1/24/2019      Done 1/23/2018 Visit Dx: Medicare annual wellness visit, initial    IMM ZOSTER VACCINES Postponed 4/20/2020 Originally 1/12/1997. System: vaccine not available, other system reasons    COLONOSCOPY Next  Due 11/21/2020      Done 11/21/2017 REFERRAL TO GI FOR COLONOSCOPY    IMM DTaP/Tdap/Td Vaccine Next Due 1/23/2028      Done 1/23/2018 Imm Admin: Tdap Vaccine          Patient Care Team:  Anshul Nair M.D. as PCP - General (Internal Medicine)  Emil Addison M.D. as Consulting Physician (Urology)  Javier Lopez O.D. as Consulting Physician (Optometry)  [facility] as Consulting Physician (Gastroenterology)  Thom Garcia M.D. as Consulting Physician (Gastroenterology)  Andreia Vazquez M.D. (Nephrology)            Health Care Screening: Age-appropriate preventive services that Medicare covers were discussed today and ordered as indicated and agreed upon by the patient, and as recommended by USPTF and ACIP.     Patient Active Problem List    Diagnosis Date Noted   • Renal artery stenosis (HCC), right 10/30/2018   • CKD (chronic kidney disease) stage 3, GFR 30-59 ml/min ([facility]) 09/19/2018   • Essential hypertension 02/17/2017   • Chronic nonseasonal allergic rhinitis due to pollen 02/17/2017   • BPH with obstruction/lower urinary tract symptoms 02/17/2017   • Dyslipidemia 02/17/2017   • Kidney calculus 01/16/2017       Current Outpatient Medications   Medication Sig Dispense Refill   • lovastatin (MEVACOR) 10 MG tablet Take 1 Tab by mouth every evening. 100 Tab 3   • losartan (COZAAR) 100 MG Tab Take 1 Tab by mouth every day. 100 Tab 3   • azelastine (ASTELIN) 137 MCG/SPRAY nasal spray [place] 1 Spray in nose 2 times a day. 30 mL 3   • tamsulosin (FLOMAX) 0.4 MG capsule Take 1 Cap by mouth ONE-HALF HOUR AFTER BREAKFAST. 100 Cap 3   • fluticasone (FLONASE) 50 MCG/ACT nasal spray Spray 2 Sprays in nose every day. 16 g 3   • coenzyme Q-10 30 MG capsule Take 60 mg by mouth every day.     • Multiple Vitamin (MULTI VITAMIN PO) Take 1 Tab by mouth every day.     • Cyanocobalamin (VITAMIN B-12) 5000 MCG SL Tab Place 1 Tab under tongue every day.     • Cholecalciferol (VITAMIN D3) 5000 UNITS Cap  Take 1 Cap by mouth every day.       No current facility-administered medications for this visit.        Family History   Problem Relation Age of Onset   • Cancer Mother         colon cancer   • Heart Disease Brother    • No Known Problems Maternal Grandmother    • Hypertension Maternal Grandfather        Social History     Socioeconomic History   • Marital status:      Spouse name: Not on file   • Number of children: Not on file   • Years of education: Not on file   • Highest education level: Not on file   Occupational History   • Not on file   Social Needs   • Financial resource strain: Not on file   • Food insecurity:     Worry: Not on file     Inability: Not on file   • Transportation needs:     Medical: Not on file     Non-medical: Not on file   Tobacco Use   • Smoking status: Former Smoker     Packs/day: 1.00     Years: 15.00     Pack years: 15.00     Types: Cigarettes     Last attempt to quit: 1987     Years since quittin.9   • Smokeless tobacco: Never Used   Substance and Sexual Activity   • Alcohol use: Yes     Alcohol/week: 1.2 oz     Types: 2 Shots of liquor per week     Comment: 3-4 per week   • Drug use: No   • Sexual activity: Yes     Partners: Female   Lifestyle   • Physical activity:     Days per week: Not on file     Minutes per session: Not on file   • Stress: Not on file   Relationships   • Social connections:     Talks on phone: Not on file     Gets together: Not on file     Attends Orthodoxy service: Not on file     Active member of club or organization: Not on file     Attends meetings of clubs or organizations: Not on file     Relationship status: Not on file   • Intimate partner violence:     Fear of current or ex partner: Not on file     Emotionally abused: Not on file     Physically abused: Not on file     Forced sexual activity: Not on file   Other Topics Concern   • Not on file   Social History Narrative   • Not on file       Past Surgical History:   Procedure Laterality  "Date   • INGUINAL HERNIA REPAIR ROBOTIC Left 2/22/2019    Procedure: INGUINAL HERNIA REPAIR ROBOTIC - W/MESH PLACEMENT;  Surgeon: Washington Alonso M.D.;  Location: SURGERY SAME DAY [facility];  Service: General   • URETEROSCOPY Left 1/16/2017    Procedure: URETEROSCOPY;  Surgeon: Emil Addison M.D.;  Location: SURGERY [facility];  Service:    • LASERTRIPSY  1/16/2017    Procedure: LASERTRIPSY - LITHO;  Surgeon: Emil Addison M.D.;  Location: SURGERY [facility];  Service:    • OTHER ORTHOPEDIC SURGERY  2011    left knee meniscus   • OTHER  [id number]    kidney stone        Allergies as of 11/21/2019 - Reviewed 11/21/2019   Allergen Reaction Noted   • Flu virus vaccine Rash 01/09/2019   • Prilosec [omeprazole] Rash 01/17/2018        ROS: Denies Chest pain, SOB, LE edema.    /66 (BP Location: Right arm, Patient Position: Sitting)   Pulse 86   Temp 36.3 °C (97.4 °F)   Ht 1.803 m (5' 11\")   Wt 88 kg (194 lb)   SpO2 94%   BMI 27.06 kg/m²     Physical Exam:  Gen:         Alert and oriented, No apparent distress.  Neck:        No Lymphadenopathy or Bruits.  Lungs:     Clear to auscultation bilaterally  CV:          Regular rate and rhythm. No murmurs, rubs or gallops.  Abd:         Soft non tender, non distended. Normal active bowel sounds.  No  Hepatosplenomegaly, No pulsatile masses.                   Ext:          No clubbing, cyanosis, edema.      Assessment and Plan.   74 y.o. male with the following issues.    1. Medicare annual wellness visit, subsequent  Discussed healthy lifestyle habits as well as screening regimens.    2. Essential hypertension  Discussion about monitoring blood pressures will see back in 3 months if pressures not below 130/80 recurrently will start on low-dose calcium channel blocker.  - MICROALBUMIN CREAT RATIO URINE; Future  - losartan (COZAAR) 100 MG Tab; Take 1 Tab by mouth every day.  Dispense: 100 Tab; Refill: 3    3. Dyslipidemia  Lipids currently well controlled. " Discussed continued diet and exercise recheck 6 months to 1 year.  - Comp Metabolic Panel; Future  - Lipid Profile; Future  - lovastatin (MEVACOR) 10 MG tablet; Take 1 Tab by mouth every evening.  Dispense: 100 Tab; Refill: 3    4. CKD (chronic kidney disease) stage 3, GFR 30-59 ml/min (HCC)  Discussion about avoidance of nephrotoxins    5. BPH with obstruction/lower urinary tract symptoms  Stable follow with urology    6. Renal artery stenosis (HCC), right  Avoidance of nephrotoxins monitor blood pressure closely    7. Need for hepatitis C screening test    - HEP C VIRUS ANTIBODY; Future    8. Screening PSA (prostate specific antigen)    - PROSTATE SPECIFIC AG SCREENING; Future            Referrals offered: Community-based lifestyle interventions to reduce health risks and promote self-management and wellness, fall prevention, nutrition, physical activity, tobacco-use cessation, weight loss, and mental health services as per orders if indicated.    Discussion today about general wellness and lifestyle habits:    · Prevent falls and reduce trip hazards; Cautioned about securing or removing rugs.  · Have a working fire alarm and carbon monoxide detector;   · Engage in regular physical activity and social activities

## 2020-05-16 ENCOUNTER — HOSPITAL ENCOUNTER (OUTPATIENT)
Dept: LAB | Facility: MEDICAL CENTER | Age: 75
End: 2020-05-16
Attending: INTERNAL MEDICINE
Payer: MEDICARE

## 2020-05-16 DIAGNOSIS — Z12.5 SCREENING PSA (PROSTATE SPECIFIC ANTIGEN): ICD-10-CM

## 2020-05-16 DIAGNOSIS — E78.5 DYSLIPIDEMIA: ICD-10-CM

## 2020-05-16 DIAGNOSIS — I10 ESSENTIAL HYPERTENSION: ICD-10-CM

## 2020-05-16 DIAGNOSIS — Z11.59 NEED FOR HEPATITIS C SCREENING TEST: ICD-10-CM

## 2020-05-16 LAB
ALBUMIN SERPL BCP-MCNC: 4.3 G/DL (ref 3.2–4.9)
ALBUMIN/GLOB SERPL: 1.7 G/DL
ALP SERPL-CCNC: 48 U/L (ref 30–99)
ALT SERPL-CCNC: 25 U/L (ref 2–50)
ANION GAP SERPL CALC-SCNC: 12 MMOL/L (ref 7–16)
AST SERPL-CCNC: 22 U/L (ref 12–45)
BILIRUB SERPL-MCNC: 0.6 MG/DL (ref 0.1–1.5)
BUN SERPL-MCNC: 22 MG/DL (ref 8–22)
CALCIUM SERPL-MCNC: 9.1 MG/DL (ref 8.5–10.5)
CHLORIDE SERPL-SCNC: 105 MMOL/L (ref 96–112)
CHOLEST SERPL-MCNC: 178 MG/DL (ref 100–199)
CO2 SERPL-SCNC: 21 MMOL/L (ref 20–33)
CREAT SERPL-MCNC: 1.28 MG/DL (ref 0.5–1.4)
CREAT UR-MCNC: 139.12 MG/DL
GLOBULIN SER CALC-MCNC: 2.6 G/DL (ref 1.9–3.5)
GLUCOSE SERPL-MCNC: 98 MG/DL (ref 65–99)
HCV AB SER QL: NORMAL
HDLC SERPL-MCNC: 50 MG/DL
LDLC SERPL CALC-MCNC: 86 MG/DL
MICROALBUMIN UR-MCNC: <1.2 MG/DL
MICROALBUMIN/CREAT UR: NORMAL MG/G (ref 0–30)
POTASSIUM SERPL-SCNC: 4.6 MMOL/L (ref 3.6–5.5)
PROT SERPL-MCNC: 6.9 G/DL (ref 6–8.2)
PSA SERPL-MCNC: 1.11 NG/ML (ref 0–4)
SODIUM SERPL-SCNC: 138 MMOL/L (ref 135–145)
TRIGL SERPL-MCNC: 208 MG/DL (ref 0–149)

## 2020-05-16 PROCEDURE — 84153 ASSAY OF PSA TOTAL: CPT

## 2020-05-16 PROCEDURE — 36415 COLL VENOUS BLD VENIPUNCTURE: CPT

## 2020-05-16 PROCEDURE — 86803 HEPATITIS C AB TEST: CPT

## 2020-05-16 PROCEDURE — 80061 LIPID PANEL: CPT

## 2020-05-16 PROCEDURE — 82570 ASSAY OF URINE CREATININE: CPT

## 2020-05-16 PROCEDURE — 80053 COMPREHEN METABOLIC PANEL: CPT

## 2020-05-16 PROCEDURE — 82043 UR ALBUMIN QUANTITATIVE: CPT

## 2020-05-20 DIAGNOSIS — J30.89 CHRONIC NONSEASONAL ALLERGIC RHINITIS DUE TO POLLEN: ICD-10-CM

## 2020-05-20 RX ORDER — FLUTICASONE PROPIONATE 50 MCG
2 SPRAY, SUSPENSION (ML) NASAL DAILY
Qty: 16 G | Refills: 0 | Status: SHIPPED | OUTPATIENT
Start: 2020-05-20 | End: 2021-05-24

## 2020-05-21 ENCOUNTER — TELEMEDICINE (OUTPATIENT)
Dept: MEDICAL GROUP | Facility: MEDICAL CENTER | Age: 75
End: 2020-05-21
Payer: MEDICARE

## 2020-05-21 VITALS
DIASTOLIC BLOOD PRESSURE: 80 MMHG | RESPIRATION RATE: 12 BRPM | SYSTOLIC BLOOD PRESSURE: 138 MMHG | BODY MASS INDEX: 26.04 KG/M2 | WEIGHT: 186 LBS | HEIGHT: 71 IN | HEART RATE: 72 BPM

## 2020-05-21 DIAGNOSIS — N18.30 CKD (CHRONIC KIDNEY DISEASE) STAGE 3, GFR 30-59 ML/MIN: ICD-10-CM

## 2020-05-21 DIAGNOSIS — I70.1 RENAL ARTERY STENOSIS (HCC): ICD-10-CM

## 2020-05-21 DIAGNOSIS — K21.9 GASTROESOPHAGEAL REFLUX DISEASE WITHOUT ESOPHAGITIS: ICD-10-CM

## 2020-05-21 DIAGNOSIS — I70.0 ATHEROSCLEROSIS OF AORTA (HCC): ICD-10-CM

## 2020-05-21 DIAGNOSIS — I10 ESSENTIAL HYPERTENSION: ICD-10-CM

## 2020-05-21 PROCEDURE — 99214 OFFICE O/P EST MOD 30 MIN: CPT | Mod: 95,CR | Performed by: INTERNAL MEDICINE

## 2020-05-21 RX ORDER — AMLODIPINE BESYLATE 2.5 MG/1
2.5 TABLET ORAL DAILY
Qty: 90 TAB | Refills: 3 | Status: SHIPPED | OUTPATIENT
Start: 2020-05-21 | End: 2020-06-29 | Stop reason: SDUPTHER

## 2020-05-21 ASSESSMENT — FIBROSIS 4 INDEX: FIB4 SCORE: 1.29

## 2020-05-21 NOTE — PROGRESS NOTES
This encounter was conducted via Zoom meeting  Verbal consent was obtained. Patient's identity was verified.       CC: Pretension, heartburn, chronic conditions                                                                                                                                      HPI:   Franklin presents today with the following.    1. Essential hypertension  Presents with blood pressure log showing readings on average slightly above 130/80.  Denies any dizziness maintained on losartan without side effect.  Kidney function is in a good range on recent check.    2. Gastroesophageal reflux disease without esophagitis  Complaining of reflux type symptoms no nausea or vomiting no food sticking no blood in his stool.  No black stools.    3. Renal artery stenosis (HCC), right  Denies any changes to urination again blood pressures not been labile kidney function reasonable range      Patient Active Problem List    Diagnosis Date Noted   • Atherosclerosis of aorta ([facility]) 05/21/2020   • Renal artery stenosis (formerly [facility]), right 10/30/2018   • CKD (chronic kidney disease) stage 3, GFR 30-59 ml/min ([facility]) 09/19/2018   • Essential hypertension 02/17/2017   • Chronic nonseasonal allergic rhinitis due to pollen 02/17/2017   • BPH with obstruction/lower urinary tract symptoms 02/17/2017   • Dyslipidemia 02/17/2017   • Kidney calculus 01/16/2017       Current Outpatient Medications   Medication Sig Dispense Refill   • amLODIPine (NORVASC) 2.5 MG Tab Take 1 Tab by mouth every day. 90 Tab 3   • losartan (COZAAR) 100 MG Tab Take 1 Tab by mouth every day. 100 Tab 3   • fluticasone (FLONASE) 50 MCG/ACT nasal spray Spray 2 Sprays in nose every day. 16 g 0   • lovastatin (MEVACOR) 10 MG tablet Take 1 Tab by mouth every evening. 100 Tab 3   • azelastine (ASTELIN) 137 MCG/SPRAY nasal spray [place] 1 Spray in nose 2 times a day. 30 mL 3   • tamsulosin (FLOMAX) 0.4 MG capsule Take 1 Cap by mouth ONE-HALF HOUR AFTER BREAKFAST. 100 Cap 3   •  "coenzyme Q-10 30 MG capsule Take 60 mg by mouth every day.     • Multiple Vitamin (MULTI VITAMIN PO) Take 1 Tab by mouth every day.     • Cyanocobalamin (VITAMIN B-12) 5000 MCG SL Tab Place 1 Tab under tongue every day.     • Cholecalciferol (VITAMIN D3) 5000 UNITS Cap Take 1 Cap by mouth every day.       No current facility-administered medications for this visit.          Allergies as of 05/21/2020 - Reviewed 05/21/2020   Allergen Reaction Noted   • Flu virus vaccine Rash 01/09/2019   • Prilosec [omeprazole] Rash 01/17/2018        ROS:  Denies, chest pain, Shortness of breath, Edema.     /80   Pulse 72   Resp 12   Ht 1.803 m (5' 11\")   Wt 84.4 kg (186 lb)   BMI 25.94 kg/m²       Physical Exam:  Constitutional: Alert, no distress, well-groomed.  Skin: No rashes in visible areas.  Eye: Round. Conjunctiva clear, lNo icterus.   ENMT: Lips pink without lesions, good dentition, moist mucous membranes. Phonation normal.  Neck: No masses, no thyromegaly. Moves freely without pain.  CV: Pulse as reported by patient  Respiratory: Unlabored respiratory effort, no cough or audible wheeze  Psych: Alert and oriented x3, normal affect and mood.          Assessment and Plan.   75 y.o. male with the following issues.    1. Essential hypertension  Very slightly above goal adding low-dose amlodipine caution about side effects see back in 1 month with blood pressure log for video visit.  - amLODIPine (NORVASC) 2.5 MG Tab; Take 1 Tab by mouth every day.  Dispense: 90 Tab; Refill: 3    2. Gastroesophageal reflux disease without esophagitis  Discussion about heartburn placing on low-dose Prilosec over-the-counter cautioned about side effects will follow-up with GI    3. Renal artery stenosis (HCC), right  Able    4. CKD (chronic kidney disease) stage 3, GFR 30-59 ml/min (HCC)  Kidney function slightly improved he CKD still present minimally discussed avoidance of nephrotoxins    5. Atherosclerosis of aorta (HCC)  10-year risk " reductions.

## 2020-06-29 ENCOUNTER — TELEMEDICINE (OUTPATIENT)
Dept: MEDICAL GROUP | Facility: MEDICAL CENTER | Age: 75
End: 2020-06-29
Payer: MEDICARE

## 2020-06-29 VITALS
BODY MASS INDEX: 25.76 KG/M2 | HEART RATE: 74 BPM | DIASTOLIC BLOOD PRESSURE: 70 MMHG | SYSTOLIC BLOOD PRESSURE: 138 MMHG | HEIGHT: 71 IN | WEIGHT: 184 LBS | RESPIRATION RATE: 14 BRPM

## 2020-06-29 DIAGNOSIS — I70.0 ATHEROSCLEROSIS OF AORTA (HCC): ICD-10-CM

## 2020-06-29 DIAGNOSIS — N18.30 CKD (CHRONIC KIDNEY DISEASE) STAGE 3, GFR 30-59 ML/MIN: ICD-10-CM

## 2020-06-29 DIAGNOSIS — I10 ESSENTIAL HYPERTENSION: ICD-10-CM

## 2020-06-29 DIAGNOSIS — I70.1 RENAL ARTERY STENOSIS (HCC): ICD-10-CM

## 2020-06-29 PROCEDURE — 99214 OFFICE O/P EST MOD 30 MIN: CPT | Mod: 95,CR | Performed by: INTERNAL MEDICINE

## 2020-06-29 PROCEDURE — 8041 PR SCP AHA: Mod: 95,CR | Performed by: INTERNAL MEDICINE

## 2020-06-29 RX ORDER — AMLODIPINE BESYLATE 2.5 MG/1
2.5 TABLET ORAL DAILY
Qty: 90 TAB | Refills: 3 | Status: SHIPPED | OUTPATIENT
Start: 2020-06-29 | End: 2021-08-09 | Stop reason: SDUPTHER

## 2020-06-29 ASSESSMENT — FIBROSIS 4 INDEX: FIB4 SCORE: 1.29

## 2020-06-29 NOTE — PROGRESS NOTES
This encounter was conducted via Zoom meeting  Verbal consent was obtained. Patient's identity was verified.     Annual Health Assessment Questions:    1.  Are you currently engaging in any exercise or physical activity? Yes    2.  How would you describe your mood or emotional well-being today? good    3.  Have you had any falls in the last year? No    4.  Have you noticed any problems with your balance or had difficulty walking? No    5.  In the last six months have you experienced any leakage of urine? No    6. DPA/Advanced Directive: Patient has Advanced Directive, but it is not on file. Instructed to bring in a copy to scan into their chart.    CC: Follow-up hypertension, kidney disease, atherosclerosis.                                                                                                                                      HPI:   Franklin presents today with the following.    1. Essential hypertension  Presents after starting on amlodipine 2.5 mg.  Diligent blood pressure log scanned into the chart reveals readings not going over 130s.  He does on average approach roughly an average of 130 systolic.  He does report some mild dizziness with standing up quickly but no fainting.  No other chest pains or shortness of breath    2. CKD (chronic kidney disease) stage 3, GFR 30-59 ml/min ([facility])  Not taking any current nephrotoxins recent GFR is at 55.    3. Renal artery stenosis (HCC), right  Tolerating Cozaar well without side effect kidney function has not changed    4. Atherosclerosis of aorta ([facility])  20 on statin recently checked found to be under good control LDL triglycerides still slightly up      Patient Active Problem List    Diagnosis Date Noted   • Atherosclerosis of aorta ([facility]) 05/21/2020   • Renal artery stenosis (HCC), right 10/30/2018   • CKD (chronic kidney disease) stage 3, GFR 30-59 ml/min ([facility]) 09/19/2018   • Essential hypertension 02/17/2017   • Chronic nonseasonal allergic rhinitis due to  "pollen 02/17/2017   • BPH with obstruction/lower urinary tract symptoms 02/17/2017   • Dyslipidemia 02/17/2017   • Kidney calculus 01/16/2017       Current Outpatient Medications   Medication Sig Dispense Refill   • amLODIPine (NORVASC) 2.5 MG Tab Take 1 Tab by mouth every day. 90 Tab 3   • fluticasone (FLONASE) 50 MCG/ACT nasal spray Spray 2 Sprays in nose every day. 16 g 0   • lovastatin (MEVACOR) 10 MG tablet Take 1 Tab by mouth every evening. 100 Tab 3   • losartan (COZAAR) 100 MG Tab Take 1 Tab by mouth every day. 100 Tab 3   • azelastine (ASTELIN) 137 MCG/SPRAY nasal spray [place] 1 Spray in nose 2 times a day. 30 mL 3   • tamsulosin (FLOMAX) 0.4 MG capsule Take 1 Cap by mouth ONE-HALF HOUR AFTER BREAKFAST. 100 Cap 3   • coenzyme Q-10 30 MG capsule Take 60 mg by mouth every day.     • Multiple Vitamin (MULTI VITAMIN PO) Take 1 Tab by mouth every day.     • Cyanocobalamin (VITAMIN B-12) 5000 MCG SL Tab Place 1 Tab under tongue every day.     • Cholecalciferol (VITAMIN D3) 5000 UNITS Cap Take 1 Cap by mouth every day.       No current facility-administered medications for this visit.          Allergies as of 06/29/2020 - Reviewed 06/29/2020   Allergen Reaction Noted   • Flu virus vaccine Rash 01/09/2019   • Prilosec [omeprazole] Rash 01/17/2018        ROS:  Denies, chest pain, Shortness of breath, Edema.     /70   Pulse 74   Resp 14   Ht 1.803 m (5' 11\")   Wt 83.5 kg (184 lb)   BMI 25.66 kg/m²       Physical Exam:  Constitutional: Alert, no distress, well-groomed.  Skin: No rashes in visible areas.  Eye: Round. Conjunctiva clear, lNo icterus.   ENMT: Lips pink without lesions, good dentition, moist mucous membranes. Phonation normal.  Neck: No masses, no thyromegaly. Moves freely without pain.  CV: Pulse as reported by patient  Respiratory: Unlabored respiratory effort, no cough or audible wheeze  Psych: Alert and oriented x3, normal affect and mood.          Assessment and Plan.   75 y.o. male with the " following issues.    1. Essential hypertension  Blood pressure near control but slight orthostasis discussed diet exercise weight loss continue to monitor.  - amLODIPine (NORVASC) 2.5 MG Tab; Take 1 Tab by mouth every day.  Dispense: 90 Tab; Refill: 3    2. CKD (chronic kidney disease) stage 3, GFR 30-59 ml/min ([facility])  Avoidance of nephrotoxins    3. Renal artery stenosis (HCC), right  10 you to follow with annual blood work    4. Atherosclerosis of aorta ([facility])  Consistent risk reduction with statin.

## 2020-08-11 DIAGNOSIS — E78.5 DYSLIPIDEMIA: ICD-10-CM

## 2020-08-11 RX ORDER — LOVASTATIN 10 MG/1
TABLET ORAL
Qty: 100 TAB | Refills: 2 | Status: SHIPPED | OUTPATIENT
Start: 2020-08-11 | End: 2021-05-24

## 2020-10-06 ENCOUNTER — OFFICE VISIT (OUTPATIENT)
Dept: URGENT CARE | Facility: CLINIC | Age: 75
End: 2020-10-06
Payer: MEDICARE

## 2020-10-06 VITALS
DIASTOLIC BLOOD PRESSURE: 72 MMHG | SYSTOLIC BLOOD PRESSURE: 110 MMHG | HEART RATE: 68 BPM | WEIGHT: 192 LBS | BODY MASS INDEX: 26.88 KG/M2 | OXYGEN SATURATION: 98 % | TEMPERATURE: 97 F | HEIGHT: 71 IN

## 2020-10-06 DIAGNOSIS — S60.459A FOREIGN BODY IN SKIN OF FINGER, INITIAL ENCOUNTER: ICD-10-CM

## 2020-10-06 PROCEDURE — 99214 OFFICE O/P EST MOD 30 MIN: CPT | Performed by: PHYSICIAN ASSISTANT

## 2020-10-06 RX ORDER — CEPHALEXIN 500 MG/1
500 CAPSULE ORAL 4 TIMES DAILY
Qty: 20 CAP | Refills: 0 | Status: SHIPPED | OUTPATIENT
Start: 2020-10-06 | End: 2020-10-11

## 2020-10-06 ASSESSMENT — ENCOUNTER SYMPTOMS
TINGLING: 0
VOMITING: 0
FEVER: 0
COUGH: 0
CHILLS: 0
NAUSEA: 0
SHORTNESS OF BREATH: 0
SENSORY CHANGE: 0
FOCAL WEAKNESS: 0

## 2020-10-06 ASSESSMENT — FIBROSIS 4 INDEX: FIB4 SCORE: 1.29

## 2020-10-06 NOTE — PROGRESS NOTES
"Subjective:   Franklin Mejía  is a 75 y.o. male who presents for Finger Injury (took sliver out last night, today swollen and red, index finger on right hand)      HPI    Patient notes he is one-week status post working in the yard when he felt a foreign body superficial injury to her right index finger just adjacent to the nail.  He notes minimal discomfort except for having \"bumped\" over the last few days.  Last night after doing so once again it became more swollen red throbbing with pain.  He did take with a needle and was able to remove portion of foreign body.  He is concerned there may be more foreign body retained.  Denies fevers or chills.  Denies discharge or drainage.  Tdap is up-to-date through 2028.    Review of Systems   Constitutional: Negative for chills and fever.   Respiratory: Negative for cough and shortness of breath.    Gastrointestinal: Negative for nausea and vomiting.   Skin: Negative for rash.   Neurological: Negative for tingling, sensory change and focal weakness.       Allergies   Allergen Reactions   • Flu Virus Vaccine Rash     Rash on both arms,   • Prilosec [Omeprazole] Rash     Mild rash        Objective:   /72   Pulse 68   Temp 36.1 °C (97 °F) (Temporal)   Ht 1.803 m (5' 11\")   Wt 87.1 kg (192 lb)   SpO2 98%   BMI 26.78 kg/m²     Physical Exam  Vitals signs and nursing note reviewed.   Constitutional:       General: He is not in acute distress.     Appearance: He is well-developed. He is not diaphoretic.   HENT:      Head: Normocephalic and atraumatic.      Right Ear: External ear normal.      Left Ear: External ear normal.      Nose: Nose normal.   Eyes:      General: No scleral icterus.        Right eye: No discharge.         Left eye: No discharge.      Conjunctiva/sclera: Conjunctivae normal.   Neck:      Musculoskeletal: Neck supple.   Pulmonary:      Effort: Pulmonary effort is normal. No respiratory distress.   Musculoskeletal: Normal range of motion.   " Right hand: He exhibits tenderness ( dorsum distal phalanx) and swelling ( diffuse distal). He exhibits normal range of motion, no bony tenderness, normal two-point discrimination, normal capillary refill, no deformity and no laceration. Normal sensation noted. Normal strength noted.        Hands:       Comments: Slight dark linear splinter visible superficially   Skin:     General: Skin is warm and dry.      Coloration: Skin is not pale.   Neurological:      Mental Status: He is alert and oriented to person, place, and time.      Coordination: Coordination normal.       Procedure: Skin Foreign Body Removal   -Risks, benefits and alternatives discussed. Risks including bleeding, nerve damage, infection and poor cosmetic outcome  -Clean technique with sterile instruments  -Local anesthesia using plain lidocaine  -Foreign body not removed, small area of pus expressed and irrigated  -Wound irrigated copiously with sterile saline  -No active bleeding after removal with minimal blood loss during procedure  -Patient tolerated well      Addendum to procedure:  -To clarify, a small incision is made over appearance of foreign body, wound is explored to depth of superficial dermis with needle and forceps; see above, foreign body not located/removed.  [facility] up to date    Assessment/Plan:   1. Foreign body in skin of finger, initial encounter  - cephALEXin (KEFLEX) 500 MG Cap; Take 1 Cap by mouth 4 times a day for 5 days.  Dispense: 20 Cap; Refill: 0    Supportive care is reviewed with patient/caregiver - recommend to push PO fluids and electrolytes,  take full course of Rx, take with probiotics, observe for resolution  Return to clinic with lack of resolution or progression of symptoms.  Epsom salt soaks 15 minutes 3-4 times a day    I have worn an N95 mask, gloves and eye protection for the entire encounter with this patient.     Differential diagnosis, natural history, supportive care, and indications for immediate  follow-up discussed.

## 2020-10-12 ENCOUNTER — TELEMEDICINE (OUTPATIENT)
Dept: MEDICAL GROUP | Facility: MEDICAL CENTER | Age: 75
End: 2020-10-12
Payer: MEDICARE

## 2020-10-12 VITALS
TEMPERATURE: 98.7 F | BODY MASS INDEX: 26.18 KG/M2 | HEART RATE: 75 BPM | WEIGHT: 187 LBS | HEIGHT: 71 IN | SYSTOLIC BLOOD PRESSURE: 130 MMHG | DIASTOLIC BLOOD PRESSURE: 76 MMHG

## 2020-10-12 DIAGNOSIS — I70.0 ATHEROSCLEROSIS OF AORTA (HCC): ICD-10-CM

## 2020-10-12 DIAGNOSIS — N18.31 STAGE 3A CHRONIC KIDNEY DISEASE: ICD-10-CM

## 2020-10-12 DIAGNOSIS — I10 ESSENTIAL HYPERTENSION: ICD-10-CM

## 2020-10-12 DIAGNOSIS — N20.0 CALCULUS OF KIDNEY: ICD-10-CM

## 2020-10-12 DIAGNOSIS — I70.1 RENAL ARTERY STENOSIS (HCC): ICD-10-CM

## 2020-10-12 DIAGNOSIS — N13.8 BPH WITH OBSTRUCTION/LOWER URINARY TRACT SYMPTOMS: ICD-10-CM

## 2020-10-12 DIAGNOSIS — E78.5 DYSLIPIDEMIA: ICD-10-CM

## 2020-10-12 DIAGNOSIS — N40.1 BPH WITH OBSTRUCTION/LOWER URINARY TRACT SYMPTOMS: ICD-10-CM

## 2020-10-12 PROCEDURE — 99214 OFFICE O/P EST MOD 30 MIN: CPT | Performed by: INTERNAL MEDICINE

## 2020-10-12 ASSESSMENT — FIBROSIS 4 INDEX: FIB4 SCORE: 1.29

## 2020-10-12 NOTE — ASSESSMENT & PLAN NOTE
He has some obstructive uropathy that is controlled with Flomax.  He is followed for this again by Dr. Addison.

## 2020-10-12 NOTE — ASSESSMENT & PLAN NOTE
He has chronic renal insufficiency with most recent GFR 55 which is relatively stable.  He is again followed for this by Dr. Addison.

## 2020-10-12 NOTE — ASSESSMENT & PLAN NOTE
He has a history of renal stones followed by Dr. Addison.  No recent flank pain or gross hematuria or discomfort.

## 2020-10-12 NOTE — PROGRESS NOTES
Subjective:     Chief Complaint   Patient presents with   • Establish Care       Virtual Visit: Established Patient   This visit was conducted via Zoom  using secure and encrypted videoconferencing technology. The patient was in a private location in the [facility].    The patient's identity was confirmed and verbal consent was obtained for this virtual visit.      CC:This patient is seen through video conference today for follow-up of atherosclerosis of aorta along with obstructive uropathy chronic renal insufficiency, hyperlipidemia, hypertension  and to establish himself in this office.                                                                                                                                   HPI:   Franklin presents today with the following.  1.  Renal calculus followed by Dr. Addison.  The patient denies flank pain or recent gross hematuria.  2.  Essential hypertension for which the patient is on losartan.  He denies significant lightheadedness.  When he was on amlodipine, he did have some lightheadedness.  He tries to follow a low-salt diet with some success.  3.  Benign prostatic hypertrophy with obstructive uropathy symptoms for which the patient is on Flomax and followed by Dr. Addison.  4.  Dyslipidemia for which the patient is on lovastatin and is trying to follow appropriate diet.  Most recent cholesterol is 178, triglycerides 208, HDL 50, LDL 86.  5.  Chronic kidney disease stage III with most recent GFR 55.  This is remaining stable.  6.  Renal artery stenosis that is currently asymptomatic.  This is followed by Dr. Addison.  7.  Atherosclerosis of the aorta that is currently asymptomatic.    Patient Active Problem List    Diagnosis Date Noted   • Atherosclerosis of aorta (HCC) 05/21/2020   • Renal artery stenosis (HCC), right 10/30/2018   • CKD (chronic kidney disease) stage 3, GFR 30-59 ml/min 09/19/2018   • Essential hypertension 02/17/2017   • Chronic nonseasonal allergic rhinitis  "due to pollen 02/17/2017   • BPH with obstruction/lower urinary tract symptoms 02/17/2017   • Dyslipidemia 02/17/2017   • Kidney calculus 01/16/2017       Current Outpatient Medications   Medication Sig Dispense Refill   • lovastatin (MEVACOR) 10 MG tablet TAKE 1 TABLET BY MOUTH EVERY EVENING  100 Tab 2   • amLODIPine (NORVASC) 2.5 MG Tab Take 1 Tab by mouth every day. 90 Tab 3   • fluticasone (FLONASE) 50 MCG/ACT nasal spray Spray 2 Sprays in nose every day. 16 g 0   • losartan (COZAAR) 100 MG Tab Take 1 Tab by mouth every day. 100 Tab 3   • tamsulosin (FLOMAX) 0.4 MG capsule Take 1 Cap by mouth ONE-HALF HOUR AFTER BREAKFAST. 100 Cap 3   • coenzyme Q-10 30 MG capsule Take 60 mg by mouth every day.     • Multiple Vitamin (MULTI VITAMIN PO) Take 1 Tab by mouth every day.     • Cyanocobalamin (VITAMIN B-12) 5000 MCG SL Tab Place 1 Tab under tongue every day.     • Cholecalciferol (VITAMIN D3) 5000 UNITS Cap Take 1 Cap by mouth every day.       No current facility-administered medications for this visit.          Allergies as of 10/12/2020 - Reviewed 10/12/2020   Allergen Reaction Noted   • Flu virus vaccine Rash 01/09/2019   • Prilosec [omeprazole] Rash 01/17/2018        ROS:  Denies, chest pain, Shortness of breath, Edema. He denies headaches or lightheadedness or recent change in vision or hearing or swallowing.  He denies significant dyspnea or chest pain or palpitations or indigestion or change in bowel habits or change in urinating.  He does have obstructive uropathy.  He denies ankle swelling.    /76   Pulse 75   Temp 37.1 °C (98.7 °F)   Ht 1.803 m (5' 11\")   Wt 84.8 kg (187 lb)   BMI 26.08 kg/m²       Physical Exam:  Constitutional: Alert, no distress, well-groomed.  Skin: No rashes in visible areas.  Eye: Round. Conjunctiva clear, lNo icterus.   ENMT: Lips pink without lesions, good dentition, moist mucous membranes. Phonation normal.  Neck: No masses, no thyromegaly. Moves freely without pain.  CV: " Pulse as reported by patient  Respiratory: Unlabored respiratory effort, no cough or audible wheeze  Psych: Alert and oriented x3, normal affect and mood.          Assessment and Plan.   75 y.o. male with the following issues.    1. Atherosclerosis of aorta (HCC)  Follow appropriate diet and he will continue statin therapy.    2. BPH with obstruction/lower urinary tract symptoms  Continue Flomax and follow-up with Dr. Addison.    3. Stage 3a chronic kidney disease  Remain well-hydrated, avoid nephrotoxic medications.    4. Dyslipidemia  Continue lovastatin and follow appropriate diet.    5. Essential hypertension  No medication change.  Follow low-salt diet.    6. Kidney calculus  Remain well-hydrated.    7. Renal artery stenosis (HCC), right  Follow renal function and blood pressure.        Followup: He will be seen in the next 2 months for periodic wellness evaluation.  At that time we will get a BMP as well as lipid panel.

## 2020-10-12 NOTE — ASSESSMENT & PLAN NOTE
Hypertension is fairly well controlled with  Losartan.  When he tried taking amlodipine, He develops lightheadedness so stopped that medication.  He is trying to follow a low-salt diet.

## 2020-10-12 NOTE — ASSESSMENT & PLAN NOTE
He has hyperlipidemia for which she is on lovastatin.  Most recent cholesterol is 178, triglycerides 208, HDL 50, LDL 86.  He does admit to eating some sweets.

## 2020-10-12 NOTE — ASSESSMENT & PLAN NOTE
He has a history of renal artery stenosis again followed by Dr. Addison.  This is stable currently.

## 2020-12-21 ENCOUNTER — HOSPITAL ENCOUNTER (OUTPATIENT)
Dept: LAB | Facility: MEDICAL CENTER | Age: 75
End: 2020-12-21
Attending: INTERNAL MEDICINE
Payer: MEDICARE

## 2020-12-21 DIAGNOSIS — E78.5 DYSLIPIDEMIA: ICD-10-CM

## 2020-12-21 DIAGNOSIS — I10 ESSENTIAL HYPERTENSION: ICD-10-CM

## 2020-12-21 LAB
ANION GAP SERPL CALC-SCNC: 9 MMOL/L (ref 7–16)
BUN SERPL-MCNC: 24 MG/DL (ref 8–22)
CALCIUM SERPL-MCNC: 9.5 MG/DL (ref 8.5–10.5)
CHLORIDE SERPL-SCNC: 106 MMOL/L (ref 96–112)
CHOLEST SERPL-MCNC: 187 MG/DL (ref 100–199)
CO2 SERPL-SCNC: 24 MMOL/L (ref 20–33)
CREAT SERPL-MCNC: 1.24 MG/DL (ref 0.5–1.4)
FASTING STATUS PATIENT QL REPORTED: NORMAL
GLUCOSE SERPL-MCNC: 86 MG/DL (ref 65–99)
HDLC SERPL-MCNC: 45 MG/DL
LDLC SERPL CALC-MCNC: 95 MG/DL
POTASSIUM SERPL-SCNC: 4.8 MMOL/L (ref 3.6–5.5)
SODIUM SERPL-SCNC: 139 MMOL/L (ref 135–145)
TRIGL SERPL-MCNC: 236 MG/DL (ref 0–149)

## 2020-12-21 PROCEDURE — 80061 LIPID PANEL: CPT

## 2020-12-21 PROCEDURE — 36415 COLL VENOUS BLD VENIPUNCTURE: CPT

## 2020-12-21 PROCEDURE — 80048 BASIC METABOLIC PNL TOTAL CA: CPT

## 2021-01-04 ENCOUNTER — OFFICE VISIT (OUTPATIENT)
Dept: MEDICAL GROUP | Facility: MEDICAL CENTER | Age: 76
End: 2021-01-04
Payer: MEDICARE

## 2021-01-04 VITALS
HEIGHT: 71 IN | WEIGHT: 195 LBS | TEMPERATURE: 97.7 F | BODY MASS INDEX: 27.3 KG/M2 | HEART RATE: 72 BPM | SYSTOLIC BLOOD PRESSURE: 110 MMHG | DIASTOLIC BLOOD PRESSURE: 70 MMHG | OXYGEN SATURATION: 96 %

## 2021-01-04 DIAGNOSIS — N18.31 STAGE 3A CHRONIC KIDNEY DISEASE: ICD-10-CM

## 2021-01-04 DIAGNOSIS — N52.8 OTHER MALE ERECTILE DYSFUNCTION: ICD-10-CM

## 2021-01-04 DIAGNOSIS — Z12.11 SCREEN FOR COLON CANCER: ICD-10-CM

## 2021-01-04 DIAGNOSIS — I10 ESSENTIAL HYPERTENSION: ICD-10-CM

## 2021-01-04 DIAGNOSIS — E78.5 DYSLIPIDEMIA: ICD-10-CM

## 2021-01-04 DIAGNOSIS — Z79.899 LONG TERM USE OF DRUG: ICD-10-CM

## 2021-01-04 DIAGNOSIS — I70.1 RENAL ARTERY STENOSIS (HCC): ICD-10-CM

## 2021-01-04 PROCEDURE — 8041 PR SCP AHA: Performed by: INTERNAL MEDICINE

## 2021-01-04 PROCEDURE — 99214 OFFICE O/P EST MOD 30 MIN: CPT | Performed by: INTERNAL MEDICINE

## 2021-01-04 RX ORDER — FENOFIBRATE 160 MG/1
160 TABLET ORAL DAILY
Qty: 90 TAB | Refills: 3 | Status: SHIPPED | OUTPATIENT
Start: 2021-01-04 | End: 2022-01-03

## 2021-01-04 RX ORDER — PANTOPRAZOLE SODIUM 20 MG/1
TABLET, DELAYED RELEASE ORAL
COMMUNITY
Start: 2020-11-19

## 2021-01-04 RX ORDER — SODIUM, POTASSIUM,MAG SULFATES 17.5-3.13G
SOLUTION, RECONSTITUTED, ORAL ORAL
COMMUNITY
Start: 2020-10-26 | End: 2021-11-19

## 2021-01-04 ASSESSMENT — PATIENT HEALTH QUESTIONNAIRE - PHQ9: CLINICAL INTERPRETATION OF PHQ2 SCORE: 0

## 2021-01-04 ASSESSMENT — FIBROSIS 4 INDEX: FIB4 SCORE: 1.29

## 2021-01-04 NOTE — ASSESSMENT & PLAN NOTE
Renal artery stenosis remains relatively stable.  Blood pressures under good control.  GFR is fairly stable at 57.

## 2021-01-04 NOTE — PROGRESS NOTES
Subjective:     Chief Complaint   Patient presents with   • Annual Exam     Preventative   • Lab Results     Franklin Mejía is a 75 y.o. male here today forFollow-up of his renal artery stenosis and erectile dysfunction and hypertension and hyperlipidemia and chronic renal insufficiency.    Renal artery stenosis (HCC), right  Renal artery stenosis remains relatively stable.  Blood pressures under good control.  GFR is fairly stable at 57.    Other male erectile dysfunction  He has erectile dysfunction.  He originally was on some Viagra and would like to get back on that medication again.  He will review with Dr. Addison.  We discussed appropriate use of the medication and potential side effects.    Essential hypertension  Hypertension is well controlled with amlodipine and losartan.  He is also on a low-salt diet.  He denies significant lightheadedness.    Dyslipidemia  He takes lovastatin for his hyperlipidemia.  Most recent cholesterol is 187 with triglycerides 236, HDL 45, LDL 95.  He tries to follow appropriate diet.  He does get some exercise.    CKD (chronic kidney disease) stage 3, GFR 30-59 ml/min  He has chronic renal insufficiency with most recent GFR 57.  He tries to remain well-hydrated.       Diagnoses of Screen for colon cancer, Stage 3a chronic kidney disease, Dyslipidemia, Essential hypertension, Long term use of drug, Renal artery stenosis (HCC), right, and Other male erectile dysfunction were pertinent to this visit.    Allergies: Flu virus vaccine and Prilosec [omeprazole]  Current medicines (including changes today)  Current Outpatient Medications   Medication Sig Dispense Refill   • pantoprazole (PROTONIX) 20 MG tablet      • fenofibrate (TRIGLIDE) 160 MG tablet Take 1 Tab by mouth every day. 90 Tab 3   • lovastatin (MEVACOR) 10 MG tablet TAKE 1 TABLET BY MOUTH EVERY EVENING  100 Tab 2   • amLODIPine (NORVASC) 2.5 MG Tab Take 1 Tab by mouth every day. 90 Tab 3   • fluticasone (FLONASE) 50  "MCG/ACT nasal spray Spray 2 Sprays in nose every day. 16 g 0   • losartan (COZAAR) 100 MG Tab Take 1 Tab by mouth every day. 100 Tab 3   • tamsulosin (FLOMAX) 0.4 MG capsule Take 1 Cap by mouth ONE-HALF HOUR AFTER BREAKFAST. 100 Cap 3   • coenzyme Q-10 30 MG capsule Take 60 mg by mouth every day.     • Multiple Vitamin (MULTI VITAMIN PO) Take 1 Tab by mouth every day.     • Cyanocobalamin (VITAMIN B-12) 5000 MCG SL Tab Place 1 Tab under tongue every day.     • Cholecalciferol (VITAMIN D3) 5000 UNITS Cap Take 1 Cap by mouth every day.     • SUPREP BOWEL PREP KIT 17.5-3.13-1.6 GM/177ML Solution        No current facility-administered medications for this visit.        He  has a past medical history of Hyperlipidemia, Hypertension, Kidney stone, and Other male erectile dysfunction (1/4/2021).  Health Maintenance: He will get a colonoscopy in the fairly near futureHe will get a colonoscopy in the fairly near future  ROS    Patient denies significant change in strength, weight or appetite.  No significant lightheadedness or headaches.  No change in vision, hearing, or swallowing.  No new dyspnea, coughing, chest pain, or palpitations.  No indigestion, abdominal pain, or change in bowel habits.  No change in urinating.  He does have some erectile dysfunction and would like some Viagra.  He will discuss with Dr. Addisno.  No new ankle swelling.       Objective:     PE:  /70 (BP Location: Right arm, Patient Position: Sitting, BP Cuff Size: Adult)   Pulse 72   Temp 36.5 °C (97.7 °F) (Temporal)   Ht 1.803 m (5' 11\")   Wt 88.5 kg (195 lb)   SpO2 96%   BMI 27.20 kg/m²    Neck is supple without significant lymphadenopathy or masses.  Lungs are clear with normal breath sounds without wheezes or rales .  Cardiovascular: peripheral circulation is satisfactory, heart sounds are unchanged and unremarkable.  Abdomen is soft, without masses or tenderness, with normal bowel sounds.  Extremities are without significant edema, " cyanosis or deformity.      Assessment and Plan:   The following treatment plan was discussed  1. Screen for colon cancer  OCCULT BLOOD FECES IMMUNOASSAY    He is scheduled for colonoscopy in the not too distant future.  He would also like to get a stool fit test kit which we ordered.   2. Stage 3a chronic kidney disease  Basic Metabolic Panel    He was encouraged to remain well-hydrated.  We will follow renal function carefully.   3. Dyslipidemia  Lipid Profile    Continue lovastatin.  We reviewed appropriate diet.  We will start fenofibrate.  We reviewed potential side effects.  Follow-up labs were ordered.   4. Essential hypertension  Basic Metabolic Panel    No medication change.  We reviewed low-salt diet.   5. Long term use of drug  HEPATIC FUNCTION PANEL    We will check a hepatic panel because we added fenofibrate.   6. Renal artery stenosis (HCC), right      Continue to follow renal function and blood pressures closely.   7. Other male erectile dysfunction      We reviewed appropriate use and potential side effects of Viagra.  He wants to discuss with Dr. Addison.       Followup: Return in about 3 months (around 4/4/2021), or wellness, for Long.

## 2021-01-04 NOTE — PROGRESS NOTES
Annual Health Assessment Questions:    1.  Are you currently engaging in any exercise or physical activity? Yes    2.  How would you describe your mood or emotional well-being today? good    3.  Have you had any falls in the last year? No    4.  Have you noticed any problems with your balance or had difficulty walking? No    5.  In the last six months have you experienced any leakage of urine? Yes    6. DPA/Advanced Directive: Patient has Advanced Directive on file.  Scanned on 2/20/2019

## 2021-01-04 NOTE — PROGRESS NOTES
Subjective:     Chief Complaint   Patient presents with   • Annual Exam     Preventative   • Lab Results     Franklin Mejía is a 75 y.o. male here today for Follow-up of erectile dysfunction as well as hypertension and hyperlipidemia and chronic renal insufficiency and renal artery stenosis.    Renal artery stenosis (HCC), right  Renal artery stenosis remains relatively stable.  Blood pressures under good control.  GFR is fairly stable at 57.    Other male erectile dysfunction  He has erectile dysfunction.  He originally was on some Viagra and would like to get back on that medication again.  He will review with Dr. Addison.  We discussed appropriate use of the medication and potential side effects.    Essential hypertension  Hypertension is well controlled with amlodipine and losartan.  He is also on a low-salt diet.  He denies significant lightheadedness.    Dyslipidemia  He takes lovastatin for his hyperlipidemia.  Most recent cholesterol is 187 with triglycerides 236, HDL 45, LDL 95.  He tries to follow appropriate diet.  He does get some exercise.    CKD (chronic kidney disease) stage 3, GFR 30-59 ml/min  He has chronic renal insufficiency with most recent GFR 57.  He tries to remain well-hydrated.       Diagnoses of Screen for colon cancer, Stage 3a chronic kidney disease, Dyslipidemia, Essential hypertension, Long term use of drug, Renal artery stenosis (HCC), right, and Other male erectile dysfunction were pertinent to this visit.    Allergies: Flu virus vaccine and Prilosec [omeprazole]  Current medicines (including changes today)  Current Outpatient Medications   Medication Sig Dispense Refill   • pantoprazole (PROTONIX) 20 MG tablet      • fenofibrate (TRIGLIDE) 160 MG tablet Take 1 Tab by mouth every day. 90 Tab 3   • lovastatin (MEVACOR) 10 MG tablet TAKE 1 TABLET BY MOUTH EVERY EVENING  100 Tab 2   • amLODIPine (NORVASC) 2.5 MG Tab Take 1 Tab by mouth every day. 90 Tab 3   • fluticasone (FLONASE) 50  MCG/ACT nasal spray Spray 2 Sprays in nose every day. 16 g 0   • losartan (COZAAR) 100 MG Tab Take 1 Tab by mouth every day. 100 Tab 3   • tamsulosin (FLOMAX) 0.4 MG capsule Take 1 Cap by mouth ONE-HALF HOUR AFTER BREAKFAST. 100 Cap 3   • coenzyme Q-10 30 MG capsule Take 60 mg by mouth every day.     • Multiple Vitamin (MULTI VITAMIN PO) Take 1 Tab by mouth every day.     • Cyanocobalamin (VITAMIN B-12) 5000 MCG SL Tab Place 1 Tab under tongue every day.     • Cholecalciferol (VITAMIN D3) 5000 UNITS Cap Take 1 Cap by mouth every day.     • SUPREP BOWEL PREP KIT 17.5-3.13-1.6 GM/177ML Solution        No current facility-administered medications for this visit.        He  has a past medical history of Hyperlipidemia, Hypertension, Kidney stone, and Other male erectile dysfunction (1/4/2021).  Health Maintenance: He is due for periodic wellness evaluation and will get that done at next visit.  He is also due for colonoscopy and will arrange for that in the fairly near future.    Annual Health Assessment Questions:     1.  Are you currently engaging in any exercise or physical activity? Yes     2.  How would you describe your mood or emotional well-being today? good     3.  Have you had any falls in the last year? No     4.  Have you noticed any problems with your balance or had difficulty walking? No     5.  In the last six months have you experienced any leakage of urine? Yes     6. DPA/Advanced Directive: Patient has Advanced Directive on file.  Scanned on 2/20/2019Annual Health Assessment Questions:       ROS    Patient denies significant change in strength, weight or appetite.  No significant lightheadedness or headaches.  No change in vision, hearing, or swallowing.  No new dyspnea, coughing, chest pain, or palpitations.  No indigestion, abdominal pain, or change in bowel habits.  No change in urinating.  No new ankle swelling.       Objective:     PE:  /70 (BP Location: Right arm, Patient Position: Sitting,  "BP Cuff Size: Adult)   Pulse 72   Temp 36.5 °C (97.7 °F) (Temporal)   Ht 1.803 m (5' 11\")   Wt 88.5 kg (195 lb)   SpO2 96%   BMI 27.20 kg/m²   Neck is supple without significant lymphadenopathy or masses.  Lungs are clear with normal breath sounds without wheezes or rales .  Cardiovascular: peripheral circulation is satisfactory, heart sounds are unchanged and unremarkable.  Abdomen is soft, without masses or tenderness, with normal bowel sounds.  Extremities are without significant edema, cyanosis or deformity.      Assessment and Plan:   The following treatment plan was discussed  1. Screen for colon cancer  OCCULT BLOOD FECES IMMUNOASSAY    He is scheduled for colonoscopy in the not too distant future.  He would also like to get a stool fit test kit which we ordered.   2. Stage 3a chronic kidney disease  Basic Metabolic Panel    He was encouraged to remain well-hydrated.  We will follow renal function carefully.   3. Dyslipidemia  Lipid Profile    Continue lovastatin.  We reviewed appropriate diet.  We will start fenofibrate.  We reviewed potential side effects.  Follow-up labs were ordered.   4. Essential hypertension  Basic Metabolic Panel    No medication change.  We reviewed low-salt diet.   5. Long term use of drug  HEPATIC FUNCTION PANEL    We will check a hepatic panel because we added fenofibrate.   6. Renal artery stenosis (HCC), right      Continue to follow renal function and blood pressures closely.   7. Other male erectile dysfunction      We reviewed appropriate use and potential side effects of Viagra.  He wants to discuss with Dr. Addison.       Followup: Return in about 3 months (around 4/4/2021), or wellness, for Long.           "

## 2021-01-04 NOTE — ASSESSMENT & PLAN NOTE
He takes lovastatin for his hyperlipidemia.  Most recent cholesterol is 187 with triglycerides 236, HDL 45, LDL 95.  He tries to follow appropriate diet.  He does get some exercise.

## 2021-01-04 NOTE — ASSESSMENT & PLAN NOTE
Hypertension is well controlled with amlodipine and losartan.  He is also on a low-salt diet.  He denies significant lightheadedness.

## 2021-01-04 NOTE — ASSESSMENT & PLAN NOTE
He has erectile dysfunction.  He originally was on some Viagra and would like to get back on that medication again.  He will review with Dr. Addison.  We discussed appropriate use of the medication and potential side effects.

## 2021-01-15 DIAGNOSIS — Z23 NEED FOR VACCINATION: ICD-10-CM

## 2021-01-26 ENCOUNTER — IMMUNIZATION (OUTPATIENT)
Dept: FAMILY PLANNING/WOMEN'S HEALTH CLINIC | Facility: IMMUNIZATION CENTER | Age: 76
End: 2021-01-26
Payer: MEDICARE

## 2021-01-26 ENCOUNTER — APPOINTMENT (OUTPATIENT)
Dept: FAMILY PLANNING/WOMEN'S HEALTH CLINIC | Facility: IMMUNIZATION CENTER | Age: 76
End: 2021-01-26
Attending: INTERNAL MEDICINE
Payer: MEDICARE

## 2021-01-26 DIAGNOSIS — Z23 NEED FOR VACCINATION: ICD-10-CM

## 2021-01-26 DIAGNOSIS — Z23 ENCOUNTER FOR VACCINATION: Primary | ICD-10-CM

## 2021-01-26 PROCEDURE — 0001A PFIZER SARS-COV-2 VACCINE: CPT

## 2021-01-26 PROCEDURE — 91300 PFIZER SARS-COV-2 VACCINE: CPT

## 2021-02-05 DIAGNOSIS — I10 ESSENTIAL HYPERTENSION: ICD-10-CM

## 2021-02-05 RX ORDER — LOSARTAN POTASSIUM 100 MG/1
100 TABLET ORAL DAILY
Qty: 100 TAB | Refills: 3 | Status: SHIPPED | OUTPATIENT
Start: 2021-02-05 | End: 2022-01-31

## 2021-02-17 ENCOUNTER — HOSPITAL ENCOUNTER (OUTPATIENT)
Facility: MEDICAL CENTER | Age: 76
End: 2021-02-17
Attending: INTERNAL MEDICINE
Payer: MEDICARE

## 2021-02-17 PROCEDURE — 82274 ASSAY TEST FOR BLOOD FECAL: CPT

## 2021-02-18 ENCOUNTER — IMMUNIZATION (OUTPATIENT)
Dept: FAMILY PLANNING/WOMEN'S HEALTH CLINIC | Facility: IMMUNIZATION CENTER | Age: 76
End: 2021-02-18
Attending: INTERNAL MEDICINE
Payer: MEDICARE

## 2021-02-18 DIAGNOSIS — Z23 ENCOUNTER FOR VACCINATION: Primary | ICD-10-CM

## 2021-02-18 PROCEDURE — 91300 PFIZER SARS-COV-2 VACCINE: CPT

## 2021-02-18 PROCEDURE — 0002A PFIZER SARS-COV-2 VACCINE: CPT

## 2021-02-25 DIAGNOSIS — Z12.11 SCREEN FOR COLON CANCER: ICD-10-CM

## 2021-03-01 LAB — IMM ASSAY OCC BLD FITOB: NEGATIVE

## 2021-05-24 DIAGNOSIS — J30.89 CHRONIC NONSEASONAL ALLERGIC RHINITIS DUE TO POLLEN: ICD-10-CM

## 2021-05-24 DIAGNOSIS — E78.5 DYSLIPIDEMIA: ICD-10-CM

## 2021-05-24 RX ORDER — FLUTICASONE PROPIONATE 50 MCG
2 SPRAY, SUSPENSION (ML) NASAL DAILY
Qty: 48 G | Refills: 3 | Status: SHIPPED | OUTPATIENT
Start: 2021-05-24 | End: 2022-08-18

## 2021-05-24 RX ORDER — LOVASTATIN 10 MG/1
10 TABLET ORAL EVERY EVENING
Qty: 100 TABLET | Refills: 3 | Status: SHIPPED | OUTPATIENT
Start: 2021-05-24 | End: 2022-08-18

## 2021-05-25 DIAGNOSIS — J30.89 CHRONIC NONSEASONAL ALLERGIC RHINITIS DUE TO POLLEN: ICD-10-CM

## 2021-05-25 RX ORDER — FLUTICASONE PROPIONATE 50 MCG
2 SPRAY, SUSPENSION (ML) NASAL DAILY
Qty: 48 G | Refills: 3 | OUTPATIENT
Start: 2021-05-25

## 2021-08-09 DIAGNOSIS — I10 ESSENTIAL HYPERTENSION: ICD-10-CM

## 2021-08-09 RX ORDER — AMLODIPINE BESYLATE 2.5 MG/1
2.5 TABLET ORAL DAILY
Qty: 90 TABLET | Refills: 0 | Status: SHIPPED | OUTPATIENT
Start: 2021-08-09 | End: 2021-11-09 | Stop reason: SDUPTHER

## 2021-08-10 ENCOUNTER — PATIENT MESSAGE (OUTPATIENT)
Dept: HEALTH INFORMATION MANAGEMENT | Facility: OTHER | Age: 76
End: 2021-08-10

## 2021-11-09 DIAGNOSIS — I10 ESSENTIAL HYPERTENSION: ICD-10-CM

## 2021-11-09 RX ORDER — AMLODIPINE BESYLATE 2.5 MG/1
2.5 TABLET ORAL DAILY
Qty: 90 TABLET | Refills: 0 | Status: SHIPPED | OUTPATIENT
Start: 2021-11-09 | End: 2022-01-31

## 2021-11-15 SDOH — ECONOMIC STABILITY: INCOME INSECURITY: IN THE LAST 12 MONTHS, WAS THERE A TIME WHEN YOU WERE NOT ABLE TO PAY THE MORTGAGE OR RENT ON TIME?: NO

## 2021-11-15 SDOH — ECONOMIC STABILITY: INCOME INSECURITY: HOW HARD IS IT FOR YOU TO PAY FOR THE VERY BASICS LIKE FOOD, HOUSING, MEDICAL CARE, AND HEATING?: NOT HARD AT ALL

## 2021-11-15 SDOH — HEALTH STABILITY: PHYSICAL HEALTH: ON AVERAGE, HOW MANY DAYS PER WEEK DO YOU ENGAGE IN MODERATE TO STRENUOUS EXERCISE (LIKE A BRISK WALK)?: 5 DAYS

## 2021-11-15 SDOH — HEALTH STABILITY: PHYSICAL HEALTH: ON AVERAGE, HOW MANY MINUTES DO YOU ENGAGE IN EXERCISE AT THIS LEVEL?: 60 MIN

## 2021-11-15 SDOH — ECONOMIC STABILITY: FOOD INSECURITY: WITHIN THE PAST 12 MONTHS, YOU WORRIED THAT YOUR FOOD WOULD RUN OUT BEFORE YOU GOT MONEY TO BUY MORE.: NEVER TRUE

## 2021-11-15 SDOH — ECONOMIC STABILITY: HOUSING INSECURITY
IN THE LAST 12 MONTHS, WAS THERE A TIME WHEN YOU DID NOT HAVE A STEADY PLACE TO SLEEP OR SLEPT IN A SHELTER (INCLUDING NOW)?: NO

## 2021-11-15 SDOH — ECONOMIC STABILITY: TRANSPORTATION INSECURITY
IN THE PAST 12 MONTHS, HAS LACK OF TRANSPORTATION KEPT YOU FROM MEETINGS, WORK, OR FROM GETTING THINGS NEEDED FOR DAILY LIVING?: NO

## 2021-11-15 SDOH — ECONOMIC STABILITY: TRANSPORTATION INSECURITY
IN THE PAST 12 MONTHS, HAS THE LACK OF TRANSPORTATION KEPT YOU FROM MEDICAL APPOINTMENTS OR FROM GETTING MEDICATIONS?: NO

## 2021-11-15 SDOH — ECONOMIC STABILITY: HOUSING INSECURITY: IN THE LAST 12 MONTHS, HOW MANY PLACES HAVE YOU LIVED?: 1

## 2021-11-15 SDOH — HEALTH STABILITY: MENTAL HEALTH
STRESS IS WHEN SOMEONE FEELS TENSE, NERVOUS, ANXIOUS, OR CAN'T SLEEP AT NIGHT BECAUSE THEIR MIND IS TROUBLED. HOW STRESSED ARE YOU?: ONLY A LITTLE

## 2021-11-15 SDOH — ECONOMIC STABILITY: FOOD INSECURITY: WITHIN THE PAST 12 MONTHS, THE FOOD YOU BOUGHT JUST DIDN'T LAST AND YOU DIDN'T HAVE MONEY TO GET MORE.: NEVER TRUE

## 2021-11-15 SDOH — ECONOMIC STABILITY: TRANSPORTATION INSECURITY
IN THE PAST 12 MONTHS, HAS LACK OF RELIABLE TRANSPORTATION KEPT YOU FROM MEDICAL APPOINTMENTS, MEETINGS, WORK OR FROM GETTING THINGS NEEDED FOR DAILY LIVING?: NO

## 2021-11-15 ASSESSMENT — SOCIAL DETERMINANTS OF HEALTH (SDOH)
DO YOU BELONG TO ANY CLUBS OR ORGANIZATIONS SUCH AS CHURCH GROUPS UNIONS, FRATERNAL OR ATHLETIC GROUPS, OR SCHOOL GROUPS?: YES
DO YOU BELONG TO ANY CLUBS OR ORGANIZATIONS SUCH AS CHURCH GROUPS UNIONS, FRATERNAL OR ATHLETIC GROUPS, OR SCHOOL GROUPS?: YES
HOW MANY DRINKS CONTAINING ALCOHOL DO YOU HAVE ON A TYPICAL DAY WHEN YOU ARE DRINKING: 1 OR 2
HOW OFTEN DO YOU HAVE A DRINK CONTAINING ALCOHOL: 2-3 TIMES A WEEK
HOW OFTEN DO YOU GET TOGETHER WITH FRIENDS OR RELATIVES?: THREE TIMES A WEEK
IN A TYPICAL WEEK, HOW MANY TIMES DO YOU TALK ON THE PHONE WITH FAMILY, FRIENDS, OR NEIGHBORS?: MORE THAN THREE TIMES A WEEK
HOW OFTEN DO YOU GET TOGETHER WITH FRIENDS OR RELATIVES?: THREE TIMES A WEEK
HOW OFTEN DO YOU ATTENT MEETINGS OF THE CLUB OR ORGANIZATION YOU BELONG TO?: MORE THAN 4 TIMES PER YEAR
IN A TYPICAL WEEK, HOW MANY TIMES DO YOU TALK ON THE PHONE WITH FAMILY, FRIENDS, OR NEIGHBORS?: MORE THAN THREE TIMES A WEEK
HOW OFTEN DO YOU ATTEND CHURCH OR RELIGIOUS SERVICES?: 1 TO 4 TIMES PER YEAR
HOW HARD IS IT FOR YOU TO PAY FOR THE VERY BASICS LIKE FOOD, HOUSING, MEDICAL CARE, AND HEATING?: NOT HARD AT ALL
HOW OFTEN DO YOU ATTEND CHURCH OR RELIGIOUS SERVICES?: 1 TO 4 TIMES PER YEAR
HOW OFTEN DO YOU HAVE SIX OR MORE DRINKS ON ONE OCCASION: NEVER
HOW OFTEN DO YOU ATTENT MEETINGS OF THE CLUB OR ORGANIZATION YOU BELONG TO?: MORE THAN 4 TIMES PER YEAR
WITHIN THE PAST 12 MONTHS, YOU WORRIED THAT YOUR FOOD WOULD RUN OUT BEFORE YOU GOT THE MONEY TO BUY MORE: NEVER TRUE

## 2021-11-15 ASSESSMENT — LIFESTYLE VARIABLES
HOW OFTEN DO YOU HAVE A DRINK CONTAINING ALCOHOL: 2-3 TIMES A WEEK
HOW OFTEN DO YOU HAVE SIX OR MORE DRINKS ON ONE OCCASION: NEVER
HOW MANY STANDARD DRINKS CONTAINING ALCOHOL DO YOU HAVE ON A TYPICAL DAY: 1 OR 2

## 2021-11-19 ENCOUNTER — OFFICE VISIT (OUTPATIENT)
Dept: MEDICAL GROUP | Facility: MEDICAL CENTER | Age: 76
End: 2021-11-19
Payer: MEDICARE

## 2021-11-19 VITALS
BODY MASS INDEX: 26.99 KG/M2 | WEIGHT: 192.8 LBS | DIASTOLIC BLOOD PRESSURE: 70 MMHG | SYSTOLIC BLOOD PRESSURE: 124 MMHG | OXYGEN SATURATION: 97 % | TEMPERATURE: 98.4 F | HEART RATE: 68 BPM | HEIGHT: 71 IN

## 2021-11-19 DIAGNOSIS — E78.5 DYSLIPIDEMIA: ICD-10-CM

## 2021-11-19 DIAGNOSIS — Z00.00 WELL ADULT EXAM: ICD-10-CM

## 2021-11-19 DIAGNOSIS — I10 ESSENTIAL HYPERTENSION: ICD-10-CM

## 2021-11-19 DIAGNOSIS — N13.8 BPH WITH OBSTRUCTION/LOWER URINARY TRACT SYMPTOMS: ICD-10-CM

## 2021-11-19 DIAGNOSIS — Z12.5 PROSTATE CANCER SCREENING: ICD-10-CM

## 2021-11-19 DIAGNOSIS — R79.89 LOW VITAMIN D LEVEL: ICD-10-CM

## 2021-11-19 DIAGNOSIS — N40.1 BPH WITH OBSTRUCTION/LOWER URINARY TRACT SYMPTOMS: ICD-10-CM

## 2021-11-19 DIAGNOSIS — N18.31 STAGE 3A CHRONIC KIDNEY DISEASE: ICD-10-CM

## 2021-11-19 PROCEDURE — 99214 OFFICE O/P EST MOD 30 MIN: CPT | Performed by: FAMILY MEDICINE

## 2021-11-19 RX ORDER — SILDENAFIL CITRATE 20 MG/1
TABLET ORAL
COMMUNITY

## 2021-11-19 RX ORDER — TAMSULOSIN HYDROCHLORIDE 0.4 MG/1
CAPSULE ORAL
COMMUNITY
End: 2021-11-19

## 2021-11-19 NOTE — ASSESSMENT & PLAN NOTE
Chronic problem. Currently on fenofibrate and lovastatin. Last lipid panel done   Component      Latest Ref Rng & Units 12/21/2020           6:09 AM   Cholesterol,Tot      100 - 199 mg/dL 187   Triglycerides      0 - 149 mg/dL 236 (H)   HDL      >=40 mg/dL 45   LDL      <100 mg/dL 95

## 2021-11-19 NOTE — PROGRESS NOTES
Subjective:     CC: Diagnoses of Essential hypertension, BPH with obstruction/lower urinary tract symptoms, Dyslipidemia, Stage 3a chronic kidney disease (HCC), Well adult exam, Prostate cancer screening, and Low vitamin D level were pertinent to this visit.    HPI: Patient is a 76 y.o. male new patient who presents today to establish care.      Essential hypertension  Chronic problem. Currently losartan and amlodpine. Has been for the past few years. No side effects. Well controlled.     BPH with obstruction/lower urinary tract symptoms  Chronic problem. Sees Dr. Addison regularly. Currently on tamsulosin. Generally well controlled.     Dyslipidemia  Chronic problem. Currently on fenofibrate and lovastatin. Last lipid panel done   Component      Latest Ref Rng & Units 2020           6:09 AM   Cholesterol,Tot      100 - 199 mg/dL 187   Triglycerides      0 - 149 mg/dL 236 (H)   HDL      >=40 mg/dL 45   LDL      <100 mg/dL 95       CKD (chronic kidney disease) stage 3, GFR 30-59 ml/min  Chronic problem. Last l      Past Medical History:   Diagnosis Date   • Hyperlipidemia    • Hypertension     pt states well controlled on meds   • Kidney stone     stones   • Other male erectile dysfunction 2021       Social History     Tobacco Use   • Smoking status: Former Smoker     Packs/day: 1.00     Years: 15.00     Pack years: 15.00     Types: Cigarettes     Quit date: 1987     Years since quittin.9   • Smokeless tobacco: Never Used   Substance Use Topics   • Alcohol use: Yes     Alcohol/week: 1.2 oz     Types: 2 Shots of liquor per week     Comment: 3-4 per week   • Drug use: No       Current Outpatient Medications Ordered in Epic   Medication Sig Dispense Refill   • sildenafil (REVATIO) 20 MG tablet sildenafil (pulmonary hypertension) 20 mg tablet   Take 2 tablets as needed by oral route as needed for 30 days.     • amLODIPine (NORVASC) 2.5 MG Tab Take 1 Tablet by mouth every day. 90 Tablet 0   • fluticasone  "(FLONASE) 50 MCG/ACT nasal spray Administer 2 Sprays into affected nostril(S) every day. 48 g 3   • lovastatin (MEVACOR) 10 MG tablet Take 1 tablet by mouth every evening. 100 tablet 3   • losartan (COZAAR) 100 MG Tab Take 1 Tab by mouth every day. 100 Tab 3   • pantoprazole (PROTONIX) 20 MG tablet      • fenofibrate (TRIGLIDE) 160 MG tablet Take 1 Tab by mouth every day. 90 Tab 3   • tamsulosin (FLOMAX) 0.4 MG capsule Take 1 Cap by mouth ONE-HALF HOUR AFTER BREAKFAST. 100 Cap 3   • coenzyme Q-10 30 MG capsule Take 60 mg by mouth every day.     • Multiple Vitamin (MULTI VITAMIN PO) Take 1 Tab by mouth every day.     • Cyanocobalamin (VITAMIN B-12) 5000 MCG SL Tab Place 1 Tab under tongue every day.     • Cholecalciferol (VITAMIN D3) 5000 UNITS Cap Take 1 Cap by mouth every day.       No current [facility]-ordered facility-administered medications on file.       Allergies:  Flu virus vaccine and Prilosec [omeprazole]    Health Maintenance: Completed    ROS:  Gen: no fevers/chill, no changes in weight  Eyes: no changes in vision  ENT: no sore throat, no hearing loss, no bloody nose  Pulm: no sob, no cough  CV: no chest pain, no palpitations  GI: no nausea/vomiting, no diarrhea  : no dysuria  MSk: no myalgias  Skin: no rash  Neuro: no headaches, no numbness/tingling  Heme/Lymph: no easy bruising      Objective:       Exam:  /70 (BP Location: Right arm, Patient Position: Sitting, BP Cuff Size: Adult long)   Pulse 68   Temp 36.9 °C (98.4 °F) (Temporal)   Ht 1.803 m (5' 11\")   Wt 87.5 kg (192 lb 12.8 oz)   SpO2 97%   BMI 26.89 kg/m²  Body mass index is 26.89 kg/m².      General: Normal appearing. No distress.  HEAD: NCAT  EYES: conjunctiva clear, lids without ptosis, pupils equal  and reactive to light  EARS: Bilateral cerumen impaction.  Neck: Supple without masses. Thyroid is not enlarged. Normal ROM  Pulmonary: Clear to ausculation.  Normal effort. No rales, ronchi, or wheezing.  Cardiovascular: Regular rate " and rhythm, no murmur. No LE edema  Neurologic: Grossly normal, no focal deficits  Lymph: No cervical or supraclavicular lymph nodes are palpable  Skin: Warm and dry.  No obvious lesions.  Musculoskeletal: Normal gait and station.   Psych: Normal mood and affect. Alert and oriented x3. Judgment and insight is normal.     Labs: 12/21/20 Results reviewed and discussed with the patient, questions answered.    Assessment & Plan:     76 y.o. male with the following -     1. Essential hypertension  Chronic problem. BP well controlled.     2. BPH with obstruction/lower urinary tract symptoms  Chronic problem. Well controlled with flomax. Followed by allergy Dr. Cyn Araujo.    3. Dyslipidemia  Chronic problem, last lipid panel LDL was well controlled.  Repeat panel ordered for this year.  - Lipid Profile; Future    4. Stage 3a chronic kidney disease (HCC)  Chronic problem, stable.  He does have a history of mild right renal artery stenosis, blood pressure stable, labs stable, no need for intervention.  - Comp Metabolic Panel; Future  - MICROALBUMIN CREAT RATIO URINE; Future    5. Well adult exam    6. Prostate cancer screening  - PROSTATE SPECIFIC AG SCREENING; Future    7. Low vitamin D level  - VITAMIN D,25 HYDROXY; Future      Return in about 2 weeks (around 12/3/2021) for Ear lavage.    Please note that this dictation was created using voice recognition software. I have made every reasonable attempt to correct obvious errors, but I expect that there are errors of grammar and possibly content that I did not discover before finalizing the note.

## 2021-11-19 NOTE — ASSESSMENT & PLAN NOTE
Chronic problem. Currently losartan and amlodpine. Has been for the past few years. No side effects. Well controlled.

## 2021-12-02 ENCOUNTER — OFFICE VISIT (OUTPATIENT)
Dept: MEDICAL GROUP | Facility: MEDICAL CENTER | Age: 76
End: 2021-12-02
Payer: MEDICARE

## 2021-12-02 VITALS
TEMPERATURE: 97.8 F | HEIGHT: 71 IN | SYSTOLIC BLOOD PRESSURE: 124 MMHG | OXYGEN SATURATION: 96 % | HEART RATE: 74 BPM | DIASTOLIC BLOOD PRESSURE: 62 MMHG | BODY MASS INDEX: 26.88 KG/M2 | WEIGHT: 192 LBS

## 2021-12-02 DIAGNOSIS — H61.23 BILATERAL HEARING LOSS DUE TO CERUMEN IMPACTION: ICD-10-CM

## 2021-12-02 PROCEDURE — 99212 OFFICE O/P EST SF 10 MIN: CPT | Performed by: FAMILY MEDICINE

## 2021-12-03 NOTE — ASSESSMENT & PLAN NOTE
Patient here today for bilateral cerumen impaction.  Hoping to do a lavage.  Does have bilateral hearing aids.  Wife had noticed some slight worsening in hearing.

## 2021-12-03 NOTE — PROGRESS NOTES
Subjective:     CC: The encounter diagnosis was Bilateral hearing loss due to cerumen impaction.    HPI: Patient is a 76 y.o. male established patient who presents today for cerumen impaction.      Bilateral hearing loss due to cerumen impaction  Patient here today for bilateral cerumen impaction.  Hoping to do a lavage.  Does have bilateral hearing aids.  Wife had noticed some slight worsening in hearing.      Past Medical History:   Diagnosis Date   • Hyperlipidemia    • Hypertension     pt states well controlled on meds   • Kidney stone     stones   • Other male erectile dysfunction 2021       Social History     Tobacco Use   • Smoking status: Former Smoker     Packs/day: 1.00     Years: 15.00     Pack years: 15.00     Types: Cigarettes     Quit date: 1987     Years since quittin.9   • Smokeless tobacco: Never Used   Substance Use Topics   • Alcohol use: Yes     Alcohol/week: 1.2 oz     Types: 2 Shots of liquor per week     Comment: 3-4 per week   • Drug use: No       Current Outpatient Medications Ordered in Epic   Medication Sig Dispense Refill   • sildenafil (REVATIO) 20 MG tablet sildenafil (pulmonary hypertension) 20 mg tablet   Take 2 tablets as needed by oral route as needed for 30 days.     • amLODIPine (NORVASC) 2.5 MG Tab Take 1 Tablet by mouth every day. 90 Tablet 0   • fluticasone (FLONASE) 50 MCG/ACT nasal spray Administer 2 Sprays into affected nostril(S) every day. 48 g 3   • lovastatin (MEVACOR) 10 MG tablet Take 1 tablet by mouth every evening. 100 tablet 3   • losartan (COZAAR) 100 MG Tab Take 1 Tab by mouth every day. 100 Tab 3   • pantoprazole (PROTONIX) 20 MG tablet      • fenofibrate (TRIGLIDE) 160 MG tablet Take 1 Tab by mouth every day. 90 Tab 3   • tamsulosin (FLOMAX) 0.4 MG capsule Take 1 Cap by mouth ONE-HALF HOUR AFTER BREAKFAST. 100 Cap 3   • coenzyme Q-10 30 MG capsule Take 60 mg by mouth every day.     • Multiple Vitamin (MULTI VITAMIN PO) Take 1 Tab by mouth every day.  "    • Cholecalciferol (VITAMIN D3) 5000 UNITS Cap Take 1 Cap by mouth every day.     • Cyanocobalamin (VITAMIN B-12) 5000 MCG SL Tab Place 1 Tab under tongue every day.       No current Epic-ordered facility-administered medications on file.       Allergies:  Flu virus vaccine and Prilosec [omeprazole]    Health Maintenance: Completed    ROS:  Gen: no fevers/chill, no changes in weight  Eyes: no changes in vision  ENT: no sore throat, no hearing loss, no bloody nose  Pulm: no sob, no cough  CV: no chest pain, no palpitations  GI: no nausea/vomiting, no diarrhea  : no dysuria  MSk: no myalgias  Skin: no rash  Neuro: no headaches, no numbness/tingling  Heme/Lymph: no easy bruising      Objective:       Exam:  /62 (BP Location: Right arm, Patient Position: Sitting, BP Cuff Size: Adult long)   Pulse 74   Temp 36.6 °C (97.8 °F) (Temporal)   Ht 1.803 m (5' 11\")   Wt 87.1 kg (192 lb)   SpO2 96%   BMI 26.78 kg/m²  Body mass index is 26.78 kg/m².    Ears: Following ear lavage able to visualize entire tympanic membrane in both ears, clear.        Assessment & Plan:     76 y.o. male with the following -     1. Bilateral hearing loss due to cerumen impaction  Bilateral lavage was successful, able to visualize both TMs.      No follow-ups on file.    Please note that this dictation was created using voice recognition software. I have made every reasonable attempt to correct obvious errors, but I expect that there are errors of grammar and possibly content that I did not discover before finalizing the note.      "

## 2022-01-03 RX ORDER — FENOFIBRATE 160 MG/1
TABLET ORAL
Qty: 90 TABLET | Refills: 0 | Status: SHIPPED | OUTPATIENT
Start: 2022-01-03 | End: 2022-02-28

## 2022-01-19 ENCOUNTER — HOSPITAL ENCOUNTER (OUTPATIENT)
Dept: LAB | Facility: MEDICAL CENTER | Age: 77
End: 2022-01-19
Attending: FAMILY MEDICINE
Payer: MEDICARE

## 2022-01-19 DIAGNOSIS — E78.5 DYSLIPIDEMIA: ICD-10-CM

## 2022-01-19 DIAGNOSIS — R79.89 LOW VITAMIN D LEVEL: ICD-10-CM

## 2022-01-19 DIAGNOSIS — Z12.5 PROSTATE CANCER SCREENING: ICD-10-CM

## 2022-01-19 DIAGNOSIS — N18.31 STAGE 3A CHRONIC KIDNEY DISEASE: ICD-10-CM

## 2022-01-19 LAB
25(OH)D3 SERPL-MCNC: 46 NG/ML (ref 30–100)
ALBUMIN SERPL BCP-MCNC: 4.5 G/DL (ref 3.2–4.9)
ALBUMIN/GLOB SERPL: 1.9 G/DL
ALP SERPL-CCNC: 32 U/L (ref 30–99)
ALT SERPL-CCNC: 16 U/L (ref 2–50)
ANION GAP SERPL CALC-SCNC: 10 MMOL/L (ref 7–16)
AST SERPL-CCNC: 21 U/L (ref 12–45)
BILIRUB SERPL-MCNC: 0.4 MG/DL (ref 0.1–1.5)
BUN SERPL-MCNC: 22 MG/DL (ref 8–22)
CALCIUM SERPL-MCNC: 9.1 MG/DL (ref 8.5–10.5)
CHLORIDE SERPL-SCNC: 107 MMOL/L (ref 96–112)
CHOLEST SERPL-MCNC: 179 MG/DL (ref 100–199)
CO2 SERPL-SCNC: 23 MMOL/L (ref 20–33)
CREAT SERPL-MCNC: 1.45 MG/DL (ref 0.5–1.4)
CREAT UR-MCNC: 139.4 MG/DL
FASTING STATUS PATIENT QL REPORTED: NORMAL
GLOBULIN SER CALC-MCNC: 2.4 G/DL (ref 1.9–3.5)
GLUCOSE SERPL-MCNC: 89 MG/DL (ref 65–99)
HDLC SERPL-MCNC: 45 MG/DL
LDLC SERPL CALC-MCNC: 102 MG/DL
MICROALBUMIN UR-MCNC: <1.2 MG/DL
MICROALBUMIN/CREAT UR: NORMAL MG/G (ref 0–30)
POTASSIUM SERPL-SCNC: 4.5 MMOL/L (ref 3.6–5.5)
PROT SERPL-MCNC: 6.9 G/DL (ref 6–8.2)
PSA SERPL-MCNC: 1.01 NG/ML (ref 0–4)
SODIUM SERPL-SCNC: 140 MMOL/L (ref 135–145)
TRIGL SERPL-MCNC: 158 MG/DL (ref 0–149)

## 2022-01-19 PROCEDURE — 80061 LIPID PANEL: CPT

## 2022-01-19 PROCEDURE — 80053 COMPREHEN METABOLIC PANEL: CPT

## 2022-01-19 PROCEDURE — 82306 VITAMIN D 25 HYDROXY: CPT

## 2022-01-19 PROCEDURE — 36415 COLL VENOUS BLD VENIPUNCTURE: CPT

## 2022-01-19 PROCEDURE — 82570 ASSAY OF URINE CREATININE: CPT

## 2022-01-19 PROCEDURE — 84153 ASSAY OF PSA TOTAL: CPT

## 2022-01-19 PROCEDURE — 82043 UR ALBUMIN QUANTITATIVE: CPT

## 2022-01-31 DIAGNOSIS — I10 ESSENTIAL HYPERTENSION: ICD-10-CM

## 2022-01-31 RX ORDER — AMLODIPINE BESYLATE 2.5 MG/1
TABLET ORAL
Qty: 90 TABLET | Refills: 0 | Status: SHIPPED | OUTPATIENT
Start: 2022-01-31 | End: 2022-04-28

## 2022-02-07 ENCOUNTER — OFFICE VISIT (OUTPATIENT)
Dept: MEDICAL GROUP | Facility: MEDICAL CENTER | Age: 77
End: 2022-02-07
Payer: MEDICARE

## 2022-02-07 VITALS
HEART RATE: 66 BPM | TEMPERATURE: 97.9 F | HEIGHT: 71 IN | DIASTOLIC BLOOD PRESSURE: 64 MMHG | BODY MASS INDEX: 27.65 KG/M2 | WEIGHT: 197.53 LBS | OXYGEN SATURATION: 97 % | SYSTOLIC BLOOD PRESSURE: 124 MMHG

## 2022-02-07 DIAGNOSIS — I10 ESSENTIAL HYPERTENSION: ICD-10-CM

## 2022-02-07 DIAGNOSIS — I70.0 ATHEROSCLEROSIS OF AORTA (HCC): ICD-10-CM

## 2022-02-07 DIAGNOSIS — N40.1 BPH WITH OBSTRUCTION/LOWER URINARY TRACT SYMPTOMS: ICD-10-CM

## 2022-02-07 DIAGNOSIS — N52.8 OTHER MALE ERECTILE DYSFUNCTION: ICD-10-CM

## 2022-02-07 DIAGNOSIS — N18.31 STAGE 3A CHRONIC KIDNEY DISEASE: ICD-10-CM

## 2022-02-07 DIAGNOSIS — K21.9 GASTROESOPHAGEAL REFLUX DISEASE WITHOUT ESOPHAGITIS: ICD-10-CM

## 2022-02-07 DIAGNOSIS — J30.89 CHRONIC NONSEASONAL ALLERGIC RHINITIS DUE TO POLLEN: ICD-10-CM

## 2022-02-07 DIAGNOSIS — E78.5 DYSLIPIDEMIA: ICD-10-CM

## 2022-02-07 DIAGNOSIS — N13.8 BPH WITH OBSTRUCTION/LOWER URINARY TRACT SYMPTOMS: ICD-10-CM

## 2022-02-07 DIAGNOSIS — I70.1 RENAL ARTERY STENOSIS (HCC): ICD-10-CM

## 2022-02-07 PROCEDURE — G0439 PPPS, SUBSEQ VISIT: HCPCS | Performed by: FAMILY MEDICINE

## 2022-02-07 ASSESSMENT — ENCOUNTER SYMPTOMS: GENERAL WELL-BEING: EXCELLENT

## 2022-02-07 ASSESSMENT — ACTIVITIES OF DAILY LIVING (ADL): BATHING_REQUIRES_ASSISTANCE: 0

## 2022-02-07 ASSESSMENT — PATIENT HEALTH QUESTIONNAIRE - PHQ9: CLINICAL INTERPRETATION OF PHQ2 SCORE: 0

## 2022-02-07 NOTE — ASSESSMENT & PLAN NOTE
Chronic problem, currently on losartan and amlodipine.  Has been on this for the past few years without side effect.  Blood pressure well controlled.

## 2022-02-07 NOTE — PROGRESS NOTES
Chief Complaint   Patient presents with   • Lab Results   • Annual Wellness Visit         HPI:  Franklin is a 77 y.o. here for Medicare Annual Wellness Visit    Renal artery stenosis (HCC), right  Chronic problem. Stenosis remainsstable.  Blood pressures under good controlled.  GFR essentially stable over the past few years. GFR 47 on labs done last month.    Other male erectile dysfunction  Chronic problem, patient has history of erectile dysfunction.  Has prescription for sildenafil as needed.    Dyslipidemia  Chronic problem, last lipid panel done 1/19/22  Currently on lovastatin 10 mg.  Patient had a small bump in his LDL up to 102. HDL normal, triglycerides mildly elevated 1 fatigue.      CKD (chronic kidney disease) stage 3, GFR 30-59 ml/min  Chronic problem, last GFR 47.      Chronic nonseasonal allergic rhinitis due to pollen  Chronic problem, uses Flonase.    BPH with obstruction/lower urinary tract symptoms  Chronic problem, followed by Dr. Addison.  Currently on tamsulosin, symptoms well controlled.    Atherosclerosis of aorta (HCC)  Chronic problem, noted incidentally, asymptomatic.  Currently on a statin.    Essential hypertension  Chronic problem, currently on losartan and amlodipine.  Has been on this for the past few years without side effect.  Blood pressure well controlled.    Gastroesophageal reflux disease without esophagitis  Chronic problem. Coming due for endoscopy. On PPI daily.         Patient Active Problem List    Diagnosis Date Noted   • Gastroesophageal reflux disease without esophagitis 02/07/2022   • Other male erectile dysfunction 01/04/2021   • Atherosclerosis of aorta (HCC) 05/21/2020   • Renal artery stenosis (HCC), right 10/30/2018   • CKD (chronic kidney disease) stage 3, GFR 30-59 ml/min (HCC) 09/19/2018   • Bilateral hearing loss due to cerumen impaction 08/14/2018   • Essential hypertension 02/17/2017   • Chronic nonseasonal allergic rhinitis due to pollen 02/17/2017   • BPH with  obstruction/lower urinary tract symptoms 02/17/2017   • Dyslipidemia 02/17/2017   • Kidney calculus 01/16/2017       Current Outpatient Medications   Medication Sig Dispense Refill   • losartan (COZAAR) 100 MG Tab TAKE ONE TABLET BY MOUTH EVERY  Tablet 3   • amLODIPine (NORVASC) 2.5 MG Tab TAKE ONE TABLET BY MOUTH EVERY DAY 90 Tablet 0   • fenofibrate (TRIGLIDE) 160 MG tablet TAKE ONE TABLET BY MOUTH ONE TIME DAILY 90 Tablet 0   • sildenafil (REVATIO) 20 MG tablet sildenafil (pulmonary hypertension) 20 mg tablet   Take 2 tablets as needed by oral route as needed for 30 days.     • fluticasone (FLONASE) 50 MCG/ACT nasal spray Administer 2 Sprays into affected nostril(S) every day. 48 g 3   • lovastatin (MEVACOR) 10 MG tablet Take 1 tablet by mouth every evening. 100 tablet 3   • pantoprazole (PROTONIX) 20 MG tablet      • tamsulosin (FLOMAX) 0.4 MG capsule Take 1 Cap by mouth ONE-HALF HOUR AFTER BREAKFAST. 100 Cap 3   • coenzyme Q-10 30 MG capsule Take 60 mg by mouth every day.     • Multiple Vitamin (MULTI VITAMIN PO) Take 1 Tab by mouth every day.     • Cyanocobalamin (VITAMIN B-12) 5000 MCG SL Tab Place 1 Tab under tongue every day.     • Cholecalciferol (VITAMIN D3) 5000 UNITS Cap Take 1 Cap by mouth every day.       No current facility-administered medications for this visit.        Patient is taking medications as noted in medication list.  Current supplements as per medication list.     Allergies: Flu virus vaccine and Prilosec [omeprazole]    Current social contact/activities: Family/Friends, Golf     Is patient current with immunizations? Yes.    He  reports that he quit smoking about 35 years ago. His smoking use included cigarettes. He has a 15.00 pack-year smoking history. He has never used smokeless tobacco. He reports current alcohol use of about 1.2 oz of alcohol per week. He reports that he does not use drugs.  Counseling given: Not Answered        DPA/Advanced directive: Patient has Advanced  Directive on file.     ROS:    Gait: Uses no assistive device   Ostomy: No   Other tubes: No   Amputations: No   Chronic oxygen use No   Last eye exam 2021  Wears hearing aids: No   : Denies any urinary leakage during the last 6 months      Screening:        Depression Screening    Little interest or pleasure in doing things?  0 - not at all  Feeling down, depressed, or hopeless? 0 - not at all  Patient Health Questionnaire Score: 0    If depressive symptoms identified deferred to follow up visit unless specifically addressed in assessment and plan.    Interpretation of PHQ-9 Total Score   Score Severity   1-4 No Depression   5-9 Mild Depression   10-14 Moderate Depression   15-19 Moderately Severe Depression   20-27 Severe Depression    Screening for Cognitive Impairment    Three Minute Recall (captain, garden, picture)  3/3    Santhosh clock face with all 12 numbers and set the hands to show 5 past 8.  Yes    If cognitive concerns identified, deferred for follow up unless specifically addressed in assessment and plan.    Fall Risk Assessment    Has the patient had two or more falls in the last year or any fall with injury in the last year?  No  If fall risk identified, deferred for follow up unless specifically addressed in assessment and plan.    Safety Assessment    Throw rugs on floor.  No  Handrails on all stairs.  No  Good lighting in all hallways.  Yes  Difficulty hearing.  No  Patient counseled about all safety risks that were identified.    Functional Assessment ADLs    Are there any barriers preventing you from cooking for yourself or meeting nutritional needs?  No.    Are there any barriers preventing you from driving safely or obtaining transportation?  No.    Are there any barriers preventing you from using a telephone or calling for help?  No.    Are there any barriers preventing you from shopping?  No.    Are there any barriers preventing you from taking care of your own finances?  No.    Are there any  barriers preventing you from managing your medications?  No.    Are there any barriers preventing you from showering, bathing or dressing yourself?  No.    Are you currently engaging in any exercise or physical activity?  Yes.  Gym 2-3 days a week- weight bearing   What is your perception of your health?  Excellent.    Health Maintenance Summary          Overdue - Annual Wellness Visit (Every 366 Days) Overdue since 11/21/2020 11/21/2019  Visit Dx: Medicare annual wellness visit, subsequent    11/21/2019  Subsequent Annual Wellness Visit - Includes PPPS ()    01/23/2018  Visit Dx: Medicare annual wellness visit, initial          Postponed - IMM ZOSTER VACCINES (1 of 2) Postponed until 10/31/2030    No completion history exists for this topic.          COLORECTAL CANCER SCREENING (COLONOSCOPY - Every 3 Years) Next due on 2/1/2024 02/17/2021  OCCULT BLOOD FECES IMMUNOASSAY    02/01/2021  REFERRAL TO GI FOR COLONOSCOPY    11/21/2017  REFERRAL TO GI FOR COLONOSCOPY          IMM DTaP/Tdap/Td Vaccine (2 - Td or Tdap) Next due on 1/23/2028 01/23/2018  Imm Admin: Tdap Vaccine          IMM PNEUMOCOCCAL VACCINE: 65+ Years (Series Information) Completed    09/19/2018  Imm Admin: Pneumococcal polysaccharide vaccine (PPSV-23)    06/12/2017  Imm Admin: Pneumococcal Conjugate Vaccine (Prevnar/PCV-13)          HEPATITIS C SCREENING  Completed    05/16/2020  HEP C VIRUS ANTIBODY          IMM INFLUENZA (Series Information) Completed    09/24/2021  Imm Admin: Influenza, Unspecified - HISTORICAL DATA    08/31/2020  Imm Admin: Influenza Vaccine Quad Inj (Pf)    10/10/2019  Imm Admin: Influenza Vaccine Quad Inj (Pf)    09/19/2018  Imm Admin: Influenza Vaccine Adult HD    09/12/2017  Imm Admin: Influenza Vaccine Adult HD    Only the first 5 history entries have been loaded, but more history exists.          COVID-19 Vaccine (Series Information) Completed    10/18/2021  Imm Admin: Pfizer SARS-CoV-2 Vaccine 12+    02/18/2021   Imm Admin: Pfizer SARS-CoV-2 Vaccine    2021  Imm Admin: Pfizer SARS-CoV-2 Vaccine          IMM HEP B VACCINE (Series Information) Aged Out    No completion history exists for this topic.          IMM MENINGOCOCCAL VACCINE (MCV4) (Series Information) Aged Out    No completion history exists for this topic.                Patient Care Team:  Maria M Khan M.D. as PCP - General (Family Medicine)  Sol Tadeo M.D. as PCP - [facility] Paneled  Emil Addison M.D. as Consulting Physician (Urology)  Javier Lopez O.D. as Consulting Physician (Optometry)  [facility] (Inactive) as Consulting Physician (Gastroenterology)  Thom Garcia M.D. as Consulting Physician (Gastroenterology)  Barbie Vazquez M.D. (Nephrology)    Social History     Tobacco Use   • Smoking status: Former Smoker     Packs/day: 1.00     Years: 15.00     Pack years: 15.00     Types: Cigarettes     Quit date: 1987     Years since quittin.1   • Smokeless tobacco: Never Used   Substance Use Topics   • Alcohol use: Yes     Alcohol/week: 1.2 oz     Types: 2 Shots of liquor per week     Comment: 3-4 per week   • Drug use: No     Family History   Problem Relation Age of Onset   • Cancer Mother         colon cancer   • Heart Disease Brother    • No Known Problems Maternal Grandmother    • Hypertension Maternal Grandfather      He  has a past medical history of Hyperlipidemia, Hypertension, Kidney stone, and Other male erectile dysfunction (2021).   Past Surgical History:   Procedure Laterality Date   • INGUINAL HERNIA REPAIR ROBOTIC Left 2019    Procedure: INGUINAL HERNIA REPAIR ROBOTIC - W/MESH PLACEMENT;  Surgeon: Washington Alonso M.D.;  Location: SURGERY SAME DAY [facility];  Service: General   • URETEROSCOPY Left 2017    Procedure: URETEROSCOPY;  Surgeon: Emil Addison M.D.;  Location: SURGERY [facility];  Service:    • LASERTRIPSY  2017    Procedure: LASERTRIPSY - LITHO;  Surgeon: Emil Addison  "M.D.;  Location: SURGERY [facility];  Service:    • OTHER ORTHOPEDIC SURGERY  2011    left knee meniscus   • OTHER  [id number]    kidney stone            Exam:     /64 (BP Location: Right arm, Patient Position: Sitting, BP Cuff Size: Adult long)   Pulse 66   Temp 36.6 °C (97.9 °F) (Temporal)   Ht 1.803 m (5' 11\")   Wt 89.6 kg (197 lb 8.5 oz)   SpO2 97%  Body mass index is 27.55 kg/m².    Hearing excellent.    Dentition good  Alert, oriented in no acute distress.  Eye contact is good, speech goal directed, affect calm      Assessment and Plan. The following treatment and monitoring plan is recommended:    1. Renal artery stenosis (HCC), right  PTH INTACT (PTH ONLY)    Basic Metabolic Panel   2. Other male erectile dysfunction     3. Dyslipidemia  Lipid Profile   4. Stage 3a chronic kidney disease (HCC)  PTH INTACT (PTH ONLY)    Basic Metabolic Panel   5. Chronic nonseasonal allergic rhinitis due to pollen     6. BPH with obstruction/lower urinary tract symptoms     7. Atherosclerosis of aorta (HCC)     8. Essential hypertension  Basic Metabolic Panel   9. Gastroesophageal reflux disease without esophagitis       Patient is overall doing quite well. Plan to repeat labs in 6 months for CKD/renal artery stenosis. Blood pressure well controlled. Follow-up in 6 months.    Services suggested: No services needed at this time  Health Care Screening recommendations as per orders if indicated.  Referrals offered: PT/OT/Nutrition counseling/Behavioral Health/Smoking cessation as per orders if indicated.    Discussion today about general wellness and lifestyle habits:    · Prevent falls and reduce trip hazards; Cautioned about securing or removing rugs.  · Have a working fire alarm and carbon monoxide detector;   · Engage in regular physical activity and social activities       Follow-up: No follow-ups on file.  "

## 2022-02-07 NOTE — ASSESSMENT & PLAN NOTE
Chronic problem, patient has history of erectile dysfunction.  Has prescription for sildenafil as needed

## 2022-02-11 ENCOUNTER — PATIENT MESSAGE (OUTPATIENT)
Dept: HEALTH INFORMATION MANAGEMENT | Facility: OTHER | Age: 77
End: 2022-02-11
Payer: MEDICARE

## 2022-02-28 RX ORDER — FENOFIBRATE 160 MG/1
TABLET ORAL
Qty: 90 TABLET | Refills: 0 | Status: SHIPPED | OUTPATIENT
Start: 2022-02-28 | End: 2022-05-19

## 2022-04-28 DIAGNOSIS — I10 ESSENTIAL HYPERTENSION: ICD-10-CM

## 2022-04-28 RX ORDER — AMLODIPINE BESYLATE 2.5 MG/1
TABLET ORAL
Qty: 90 TABLET | Refills: 0 | Status: SHIPPED | OUTPATIENT
Start: 2022-04-28 | End: 2022-07-26

## 2022-05-04 ENCOUNTER — HOSPITAL ENCOUNTER (OUTPATIENT)
Dept: LAB | Facility: MEDICAL CENTER | Age: 77
End: 2022-05-04
Attending: FAMILY MEDICINE
Payer: MEDICARE

## 2022-05-04 DIAGNOSIS — I70.1 RENAL ARTERY STENOSIS (HCC): ICD-10-CM

## 2022-05-04 DIAGNOSIS — I10 ESSENTIAL HYPERTENSION: ICD-10-CM

## 2022-05-04 DIAGNOSIS — E78.5 DYSLIPIDEMIA: ICD-10-CM

## 2022-05-04 DIAGNOSIS — N18.31 STAGE 3A CHRONIC KIDNEY DISEASE: ICD-10-CM

## 2022-05-04 LAB
ANION GAP SERPL CALC-SCNC: 10 MMOL/L (ref 7–16)
BUN SERPL-MCNC: 26 MG/DL (ref 8–22)
CALCIUM SERPL-MCNC: 9.5 MG/DL (ref 8.5–10.5)
CHLORIDE SERPL-SCNC: 106 MMOL/L (ref 96–112)
CHOLEST SERPL-MCNC: 175 MG/DL (ref 100–199)
CO2 SERPL-SCNC: 24 MMOL/L (ref 20–33)
CREAT SERPL-MCNC: 1.51 MG/DL (ref 0.5–1.4)
FASTING STATUS PATIENT QL REPORTED: NORMAL
GFR SERPLBLD CREATININE-BSD FMLA CKD-EPI: 48 ML/MIN/1.73 M 2
GLUCOSE SERPL-MCNC: 94 MG/DL (ref 65–99)
HDLC SERPL-MCNC: 44 MG/DL
LDLC SERPL CALC-MCNC: 105 MG/DL
POTASSIUM SERPL-SCNC: 4.9 MMOL/L (ref 3.6–5.5)
PTH-INTACT SERPL-MCNC: 18.1 PG/ML (ref 14–72)
SODIUM SERPL-SCNC: 140 MMOL/L (ref 135–145)
TRIGL SERPL-MCNC: 132 MG/DL (ref 0–149)

## 2022-05-04 PROCEDURE — 80048 BASIC METABOLIC PNL TOTAL CA: CPT

## 2022-05-04 PROCEDURE — 83970 ASSAY OF PARATHORMONE: CPT

## 2022-05-04 PROCEDURE — 80061 LIPID PANEL: CPT

## 2022-05-04 PROCEDURE — 36415 COLL VENOUS BLD VENIPUNCTURE: CPT

## 2022-05-19 RX ORDER — FENOFIBRATE 160 MG/1
TABLET ORAL
Qty: 90 TABLET | Refills: 0 | Status: SHIPPED | OUTPATIENT
Start: 2022-05-19 | End: 2022-08-18

## 2022-06-27 ENCOUNTER — OFFICE VISIT (OUTPATIENT)
Dept: MEDICAL GROUP | Facility: MEDICAL CENTER | Age: 77
End: 2022-06-27
Payer: MEDICARE

## 2022-06-27 VITALS
HEIGHT: 71 IN | TEMPERATURE: 98.1 F | SYSTOLIC BLOOD PRESSURE: 122 MMHG | BODY MASS INDEX: 26.46 KG/M2 | DIASTOLIC BLOOD PRESSURE: 64 MMHG | OXYGEN SATURATION: 97 % | WEIGHT: 189 LBS | HEART RATE: 63 BPM

## 2022-06-27 DIAGNOSIS — N18.31 STAGE 3A CHRONIC KIDNEY DISEASE: ICD-10-CM

## 2022-06-27 DIAGNOSIS — I70.1 RENAL ARTERY STENOSIS (HCC): ICD-10-CM

## 2022-06-27 DIAGNOSIS — Z12.5 PROSTATE CANCER SCREENING: ICD-10-CM

## 2022-06-27 DIAGNOSIS — E78.5 DYSLIPIDEMIA: ICD-10-CM

## 2022-06-27 DIAGNOSIS — N26.1 ATROPHY OF KIDNEY: ICD-10-CM

## 2022-06-27 DIAGNOSIS — I10 ESSENTIAL HYPERTENSION: ICD-10-CM

## 2022-06-27 DIAGNOSIS — K21.9 GASTROESOPHAGEAL REFLUX DISEASE WITHOUT ESOPHAGITIS: ICD-10-CM

## 2022-06-27 PROCEDURE — 99214 OFFICE O/P EST MOD 30 MIN: CPT | Performed by: FAMILY MEDICINE

## 2022-06-27 RX ORDER — SILDENAFIL CITRATE 20 MG/1
TABLET ORAL
COMMUNITY
End: 2022-06-27

## 2022-06-27 NOTE — ASSESSMENT & PLAN NOTE
Had u/s with urology [place] after passing some debris  U/s showed cyst and possible mass vs. scarring

## 2022-06-28 NOTE — PROGRESS NOTES
Subjective:     CC: Diagnoses of Stage 3a chronic kidney disease (HCC), Renal artery stenosis (HCC), right, Atrophy of kidney, Gastroesophageal reflux disease without esophagitis, Essential hypertension, Dyslipidemia, and Prostate cancer screening were pertinent to this visit.    HPI: Patient is a 77 y.o. male established patient who presents today to follow up on labs.       Atrophy of kidney  CKD  WANDA  Chronic problem.   GFR stable around 48.   Followed by nephrology  BP well controlled  Had u/s with urology [place] after passing some debris  U/s showed cyst and possible mass vs. Scarring  CT scheduled    Gastroesophageal reflux disease without esophagitis  Had recent endoscopy.   Had some stretching done  Biopsy looked good  Advised to continue PPI.     Essential hypertension  Walking and playing golf  Eating better  Down 10 lbs.   BP well controlled.     Past Medical History:   Diagnosis Date   • Hyperlipidemia    • Hypertension     pt states well controlled on meds   • Kidney stone     stones   • Other male erectile dysfunction 2021       Social History     Tobacco Use   • Smoking status: Former Smoker     Packs/day: 1.00     Years: 15.00     Pack years: 15.00     Types: Cigarettes     Quit date: 1987     Years since quittin.5   • Smokeless tobacco: Never Used   Substance Use Topics   • Alcohol use: Yes     Alcohol/week: 1.2 oz     Types: 2 Shots of liquor per week     Comment: 3-4 per week   • Drug use: No       Current Outpatient Medications Ordered in Epic   Medication Sig Dispense Refill   • fenofibrate (TRIGLIDE) 160 MG tablet TAKE ONE TABLET BY MOUTH ONE TIME DAILY 90 Tablet 0   • amLODIPine (NORVASC) 2.5 MG Tab TAKE ONE TABLET BY MOUTH ONE TIME DAILY 90 Tablet 0   • losartan (COZAAR) 100 MG Tab TAKE ONE TABLET BY MOUTH EVERY  Tablet 3   • sildenafil (REVATIO) 20 MG tablet sildenafil (pulmonary hypertension) 20 mg tablet   Take 2 tablets as needed by oral route as needed for 30 days.    "  • fluticasone (FLONASE) 50 MCG/ACT nasal spray Administer 2 Sprays into affected nostril(S) every day. 48 g 3   • lovastatin (MEVACOR) 10 MG tablet Take 1 tablet by mouth every evening. 100 tablet 3   • pantoprazole (PROTONIX) 20 MG tablet      • tamsulosin (FLOMAX) 0.4 MG capsule Take 1 Cap by mouth ONE-HALF HOUR AFTER BREAKFAST. 100 Cap 3   • coenzyme Q-10 30 MG capsule Take 60 mg by mouth every day.     • Multiple Vitamin (MULTI VITAMIN PO) Take 1 Tab by mouth every day.     • Cyanocobalamin (VITAMIN B-12) 5000 MCG SL Tab Place 1 Tab under tongue every day.     • Cholecalciferol (VITAMIN D3) 5000 UNITS Cap Take 1 Cap by mouth every day.       No current [facility]-ordered facility-administered medications on file.       Allergies:  Flu virus vaccine and Prilosec [omeprazole]    Health Maintenance: Completed    Objective:       Exam:  /64 (BP Location: Right arm, Patient Position: Sitting, BP Cuff Size: Adult long)   Pulse 63   Temp 36.7 °C (98.1 °F) (Temporal)   Ht 1.803 m (5' 11\")   Wt 85.7 kg (189 lb)   SpO2 97%   BMI 26.36 kg/m²  Body mass index is 26.36 kg/m².    General: Normal appearing. No distress.  HEAD: NCAT  EYES: conjunctiva clear, lids without ptosis, pupils equal and reactive to light  EARS: ears normal shape and contour.  MOUTH: normal dentition   Neck:  Normal ROM  Pulmonary: Normal effort. Normal respiratory rate.  Cardiovascular: Well perfused. No LE edema  Neurologic: Grossly normal, no focal deficits  Skin: Warm and dry.  No obvious lesions.  Musculoskeletal: Normal gait and station.   Psych: Normal mood and affect. Alert and oriented x3. Judgment and insight is normal.      Labs: 5/4/22 Results reviewed and discussed with the patient, questions answered.    Assessment & Plan:     77 y.o. male with the following -     1. Stage 3a chronic kidney disease (HCC)  2. Renal artery stenosis (HCC), right  3. Atrophy of kidney  Chronic problem, patient has known history of renal artery " stenosis.  This is remained stable.  Blood pressures look great.  Being followed by urology, had recent ultrasound which did show possible scarring versus mass and so he is scheduled for a CT scan.  Reviewed recent blood work, GFR is stable.  Plan to continue monitor.  Annual labs ordered.  - Comp Metabolic Panel; Future    4. Gastroesophageal reflux disease without esophagitis  Chronic problem, followed by GI.  Did have recent endoscopy.  They did do some stretching of the esophagus.  Remains on daily PPIs.    5. Essential hypertension  Chronic problem, well-controlled on current medications.  - Comp Metabolic Panel; Future    6. Dyslipidemia  - Lipid Profile; Future      Return in about 6 months (around 12/27/2022).    Please note that this dictation was created using voice recognition software. I have made every reasonable attempt to correct obvious errors, but I expect that there are errors of grammar and possibly content that I did not discover before finalizing the note.

## 2022-07-06 ENCOUNTER — TELEPHONE (OUTPATIENT)
Dept: HEALTH INFORMATION MANAGEMENT | Facility: OTHER | Age: 77
End: 2022-07-06
Payer: MEDICARE

## 2022-07-25 DIAGNOSIS — I10 ESSENTIAL HYPERTENSION: ICD-10-CM

## 2022-07-26 RX ORDER — AMLODIPINE BESYLATE 2.5 MG/1
TABLET ORAL
Qty: 90 TABLET | Refills: 0 | Status: SHIPPED | OUTPATIENT
Start: 2022-07-26 | End: 2022-11-04

## 2022-08-18 RX ORDER — FENOFIBRATE 160 MG/1
TABLET ORAL
Qty: 90 TABLET | Refills: 0 | Status: SHIPPED | OUTPATIENT
Start: 2022-08-18 | End: 2022-11-21

## 2022-10-26 ENCOUNTER — HOSPITAL ENCOUNTER (OUTPATIENT)
Dept: RADIOLOGY | Facility: MEDICAL CENTER | Age: 77
End: 2022-10-26
Attending: PHYSICIAN ASSISTANT
Payer: MEDICARE

## 2022-10-26 DIAGNOSIS — N28.89 OTHER SPECIFIED DISORDERS OF KIDNEY AND URETER: ICD-10-CM

## 2022-10-26 PROCEDURE — 74176 CT ABD & PELVIS W/O CONTRAST: CPT

## 2022-11-02 DIAGNOSIS — I10 ESSENTIAL HYPERTENSION: ICD-10-CM

## 2022-11-04 RX ORDER — AMLODIPINE BESYLATE 2.5 MG/1
TABLET ORAL
Qty: 90 TABLET | Refills: 0 | Status: SHIPPED | OUTPATIENT
Start: 2022-11-04 | End: 2023-02-06

## 2022-11-21 DIAGNOSIS — J30.89 CHRONIC NONSEASONAL ALLERGIC RHINITIS DUE TO POLLEN: ICD-10-CM

## 2022-11-21 RX ORDER — FLUTICASONE PROPIONATE 50 MCG
SPRAY, SUSPENSION (ML) NASAL
Qty: 48 G | Refills: 0 | Status: SHIPPED | OUTPATIENT
Start: 2022-11-21 | End: 2023-02-27

## 2022-11-21 RX ORDER — FENOFIBRATE 160 MG/1
TABLET ORAL
Qty: 90 TABLET | Refills: 0 | Status: SHIPPED | OUTPATIENT
Start: 2022-11-21 | End: 2023-02-27

## 2023-01-19 ENCOUNTER — HOSPITAL ENCOUNTER (OUTPATIENT)
Dept: LAB | Facility: MEDICAL CENTER | Age: 78
End: 2023-01-19
Attending: FAMILY MEDICINE
Payer: MEDICARE

## 2023-01-19 ENCOUNTER — TELEMEDICINE (OUTPATIENT)
Dept: MEDICAL GROUP | Facility: MEDICAL CENTER | Age: 78
End: 2023-01-19
Payer: MEDICARE

## 2023-01-19 VITALS — BODY MASS INDEX: 26.88 KG/M2 | HEIGHT: 71 IN | WEIGHT: 192 LBS | TEMPERATURE: 98.6 F

## 2023-01-19 DIAGNOSIS — U07.1 COVID-19: ICD-10-CM

## 2023-01-19 PROBLEM — N21.0 URINARY BLADDER STONE: Status: ACTIVE | Noted: 2022-10-27

## 2023-01-19 LAB
ANION GAP SERPL CALC-SCNC: 12 MMOL/L (ref 7–16)
BUN SERPL-MCNC: 17 MG/DL (ref 8–22)
CALCIUM SERPL-MCNC: 9.3 MG/DL (ref 8.5–10.5)
CHLORIDE SERPL-SCNC: 102 MMOL/L (ref 96–112)
CO2 SERPL-SCNC: 23 MMOL/L (ref 20–33)
CREAT SERPL-MCNC: 1.69 MG/DL (ref 0.5–1.4)
GFR SERPLBLD CREATININE-BSD FMLA CKD-EPI: 42 ML/MIN/1.73 M 2
GLUCOSE SERPL-MCNC: 87 MG/DL (ref 65–99)
POTASSIUM SERPL-SCNC: 4.6 MMOL/L (ref 3.6–5.5)
SODIUM SERPL-SCNC: 137 MMOL/L (ref 135–145)

## 2023-01-19 PROCEDURE — 99214 OFFICE O/P EST MOD 30 MIN: CPT | Mod: 95 | Performed by: FAMILY MEDICINE

## 2023-01-19 PROCEDURE — 36415 COLL VENOUS BLD VENIPUNCTURE: CPT

## 2023-01-19 PROCEDURE — 80048 BASIC METABOLIC PNL TOTAL CA: CPT

## 2023-01-19 RX ORDER — NIRMATRELVIR AND RITONAVIR 150-100 MG
KIT ORAL
Qty: 20 EACH | Refills: 0 | Status: SHIPPED | OUTPATIENT
Start: 2023-01-19 | End: 2023-03-06

## 2023-01-19 ASSESSMENT — PATIENT HEALTH QUESTIONNAIRE - PHQ9: CLINICAL INTERPRETATION OF PHQ2 SCORE: 0

## 2023-01-19 NOTE — ASSESSMENT & PLAN NOTE
Symtpomst started MOnday afternoon  Tested positive on wednes  Now it is Thursday  Symtpoms do seem to be improving a bit.   Nasal congestio, cough, body aches, headache, fatigue, no fevers.

## 2023-01-19 NOTE — PROGRESS NOTES
Virtual Visit: Established Patient   This visit was conducted via Zoom using secure and encrypted videoconferencing technology.   The patient was in their home in the state of [place].    The patient's identity was confirmed and verbal consent was obtained for this virtual visit.     Subjective:   CC:   Chief Complaint   Patient presents with    Coronavirus Screening     Tested positive 01/18/2023         Fatigue    Nasal Congestion       Franklin Mejía is a 78 y.o. male presenting for evaluation and management of:    COVID-19  Symtpomst started Monday afternoon  Tested positive on Wednesday  Now it is Thursday  Symtpoms do seem to be improving a bit.   Continues to have Nasal congestion, cough, body aches, headache, fatigue, no fevers.          Current medicines (including changes today)  Current Outpatient Medications   Medication Sig Dispense Refill    Nirmatrelvir&Ritonavir 150/100 (PAXLOVID, 150/100,) 10 x 150 MG & 10 x 100MG Tablet Therapy Pack Nirmatrelvir 1 150mg tab po BID x 5 days /Ritonovir 1 100mg tab po BID x 5 days. (Reduced dose for GFR of 42) 20 Each 0    fluticasone (FLONASE) 50 MCG/ACT nasal spray USE 2 SPRAYS IN EACH NOSTRIL ONCE DAILY 48 g 0    fenofibrate (TRIGLIDE) 160 MG tablet TAKE ONE TABLET BY MOUTH ONE TIME DAILY 90 Tablet 0    amLODIPine (NORVASC) 2.5 MG Tab TAKE ONE TABLET BY MOUTH ONE TIME DAILY 90 Tablet 0    lovastatin (MEVACOR) 10 MG tablet TAKE ONE TABLET BY MOUTH EVERY EVENING 100 Tablet 3    losartan (COZAAR) 100 MG Tab TAKE ONE TABLET BY MOUTH EVERY  Tablet 3    sildenafil (REVATIO) 20 MG tablet sildenafil (pulmonary hypertension) 20 mg tablet   Take 2 tablets as needed by oral route as needed for 30 days.      pantoprazole (PROTONIX) 20 MG tablet       tamsulosin (FLOMAX) 0.4 MG capsule Take 1 Cap by mouth ONE-HALF HOUR AFTER BREAKFAST. 100 Cap 3    coenzyme Q-10 30 MG capsule Take 60 mg by mouth every day.      Multiple Vitamin (MULTI VITAMIN PO) Take 1 Tab by mouth  "every day.      Cyanocobalamin (VITAMIN B-12) 5000 MCG SL Tab Place 1 Tab under tongue every day.      Cholecalciferol (VITAMIN D3) 5000 UNITS Cap Take 1 Cap by mouth every day.       No current facility-administered medications for this visit.       Patient Active Problem List    Diagnosis Date Noted    COVID-19 01/19/2023    Atrophy of kidney 03/02/2022    Gastroesophageal reflux disease without esophagitis 02/07/2022    Other male erectile dysfunction 01/04/2021    Atherosclerosis of aorta ([facility]) 05/21/2020    Renal artery stenosis ([facility]), right 10/30/2018    CKD (chronic kidney disease) stage 3, GFR 30-59 ml/min ([facility]) 09/19/2018    Bilateral hearing loss due to cerumen impaction 08/14/2018    Essential hypertension 02/17/2017    Chronic nonseasonal allergic rhinitis due to pollen 02/17/2017    BPH with obstruction/lower urinary tract symptoms 02/17/2017    Dyslipidemia 02/17/2017    Kidney calculus 01/16/2017        Objective:   Temp 37 °C (98.6 °F) (Temporal)   Ht 1.803 m (5' 11\")   Wt 87.1 kg (192 lb)   BMI 26.78 kg/m²     Physical Exam:  Constitutional: Alert, no distress, well-groomed.  Skin: No rashes in visible areas.  Eye: Round. Conjunctiva clear, lids normal. No icterus.   ENMT: Lips pink without lesions, good dentition, moist mucous membranes. Phonation normal.  Neck: No masses, no thyromegaly. Moves freely without pain.  Respiratory: Unlabored respiratory effort, no cough or audible wheeze  Psych: Alert and oriented x3, normal affect and mood.     Assessment and Plan:   The following treatment plan was discussed:     1. COVID-19  2. CKD stage 3  Given age and comorbidites, recommend paxlovid. However, no recent GFR on file. Stat BMP ordered which showed GFR 42. Recommend reduced dose. Patient currently on lovastatin, recommend to discontinue, ok to restart 5 days following finishing the paxlovid. Avoid sildenafil while taking medication as well  F/u in 2 weeks   - Basic Metabolic Panel; " Future      Follow-up: Return in about 2 weeks (around 2/2/2023).

## 2023-01-19 NOTE — Clinical Note
Hi,  Can you please let the patient know that I have sent the reduced dosing for his kidney function. And please set a f/u visit for 2 weeks out? Thanks! MARÍA Prieto

## 2023-02-03 DIAGNOSIS — I10 ESSENTIAL HYPERTENSION: ICD-10-CM

## 2023-02-06 RX ORDER — AMLODIPINE BESYLATE 2.5 MG/1
TABLET ORAL
Qty: 90 TABLET | Refills: 0 | Status: SHIPPED | OUTPATIENT
Start: 2023-02-06 | End: 2023-05-10 | Stop reason: SDUPTHER

## 2023-02-07 DIAGNOSIS — I10 ESSENTIAL HYPERTENSION: ICD-10-CM

## 2023-02-07 RX ORDER — LOSARTAN POTASSIUM 100 MG/1
100 TABLET ORAL
Qty: 100 TABLET | Refills: 3 | Status: SHIPPED | OUTPATIENT
Start: 2023-02-07 | End: 2024-01-03 | Stop reason: SDUPTHER

## 2023-02-27 ENCOUNTER — HOSPITAL ENCOUNTER (OUTPATIENT)
Dept: LAB | Facility: MEDICAL CENTER | Age: 78
End: 2023-02-27
Attending: FAMILY MEDICINE
Payer: MEDICARE

## 2023-02-27 DIAGNOSIS — J30.89 CHRONIC NONSEASONAL ALLERGIC RHINITIS DUE TO POLLEN: ICD-10-CM

## 2023-02-27 DIAGNOSIS — I10 ESSENTIAL HYPERTENSION: ICD-10-CM

## 2023-02-27 DIAGNOSIS — N18.31 STAGE 3A CHRONIC KIDNEY DISEASE: ICD-10-CM

## 2023-02-27 DIAGNOSIS — E78.5 DYSLIPIDEMIA: ICD-10-CM

## 2023-02-27 LAB
ALBUMIN SERPL BCP-MCNC: 4.3 G/DL (ref 3.2–4.9)
ALBUMIN/GLOB SERPL: 1.7 G/DL
ALP SERPL-CCNC: 33 U/L (ref 30–99)
ALT SERPL-CCNC: 16 U/L (ref 2–50)
ANION GAP SERPL CALC-SCNC: 9 MMOL/L (ref 7–16)
AST SERPL-CCNC: 20 U/L (ref 12–45)
BILIRUB SERPL-MCNC: 0.5 MG/DL (ref 0.1–1.5)
BUN SERPL-MCNC: 24 MG/DL (ref 8–22)
CALCIUM ALBUM COR SERPL-MCNC: 9.3 MG/DL (ref 8.5–10.5)
CALCIUM SERPL-MCNC: 9.5 MG/DL (ref 8.5–10.5)
CHLORIDE SERPL-SCNC: 107 MMOL/L (ref 96–112)
CHOLEST SERPL-MCNC: 177 MG/DL (ref 100–199)
CO2 SERPL-SCNC: 24 MMOL/L (ref 20–33)
CREAT SERPL-MCNC: 1.75 MG/DL (ref 0.5–1.4)
FASTING STATUS PATIENT QL REPORTED: NORMAL
GFR SERPLBLD CREATININE-BSD FMLA CKD-EPI: 40 ML/MIN/1.73 M 2
GLOBULIN SER CALC-MCNC: 2.5 G/DL (ref 1.9–3.5)
GLUCOSE SERPL-MCNC: 103 MG/DL (ref 65–99)
HDLC SERPL-MCNC: 47 MG/DL
LDLC SERPL CALC-MCNC: 99 MG/DL
POTASSIUM SERPL-SCNC: 4.9 MMOL/L (ref 3.6–5.5)
PROT SERPL-MCNC: 6.8 G/DL (ref 6–8.2)
SODIUM SERPL-SCNC: 140 MMOL/L (ref 135–145)
TRIGL SERPL-MCNC: 155 MG/DL (ref 0–149)

## 2023-02-27 PROCEDURE — 80061 LIPID PANEL: CPT

## 2023-02-27 PROCEDURE — 80053 COMPREHEN METABOLIC PANEL: CPT

## 2023-02-27 PROCEDURE — 36415 COLL VENOUS BLD VENIPUNCTURE: CPT

## 2023-02-27 RX ORDER — FENOFIBRATE 160 MG/1
TABLET ORAL
Qty: 90 TABLET | Refills: 0 | Status: SHIPPED | OUTPATIENT
Start: 2023-02-27 | End: 2023-06-01 | Stop reason: SDUPTHER

## 2023-02-27 RX ORDER — FLUTICASONE PROPIONATE 50 MCG
SPRAY, SUSPENSION (ML) NASAL
Qty: 48 G | Refills: 0 | Status: SHIPPED | OUTPATIENT
Start: 2023-02-27 | End: 2023-07-03 | Stop reason: SDUPTHER

## 2023-02-27 NOTE — TELEPHONE ENCOUNTER
Received request via: Pharmacy    Was the patient seen in the last year in this department? Yes    Does the patient have an active prescription (recently filled or refills available) for medication(s) requested? No    Does the patient have care home Plus and need 100 day supply (blood pressure, diabetes and cholesterol meds only)? Medication is not for cholesterol, blood pressure or diabetes

## 2023-02-28 ENCOUNTER — APPOINTMENT (OUTPATIENT)
Dept: ADMISSIONS | Facility: MEDICAL CENTER | Age: 78
End: 2023-02-28
Payer: MEDICARE

## 2023-03-01 ENCOUNTER — PRE-ADMISSION TESTING (OUTPATIENT)
Dept: ADMISSIONS | Facility: MEDICAL CENTER | Age: 78
End: 2023-03-01
Attending: UROLOGY
Payer: MEDICARE

## 2023-03-01 DIAGNOSIS — Z01.810 PRE-OPERATIVE CARDIOVASCULAR EXAMINATION: ICD-10-CM

## 2023-03-01 DIAGNOSIS — Z01.812 PRE-OPERATIVE LABORATORY EXAMINATION: ICD-10-CM

## 2023-03-01 LAB
APPEARANCE UR: CLEAR
BASOPHILS # BLD AUTO: 0.8 % (ref 0–1.8)
BASOPHILS # BLD: 0.05 K/UL (ref 0–0.12)
BILIRUB UR QL STRIP.AUTO: NEGATIVE
COLOR UR: YELLOW
EKG IMPRESSION: NORMAL
EOSINOPHIL # BLD AUTO: 0.1 K/UL (ref 0–0.51)
EOSINOPHIL NFR BLD: 1.5 % (ref 0–6.9)
ERYTHROCYTE [DISTWIDTH] IN BLOOD BY AUTOMATED COUNT: 45.5 FL (ref 35.9–50)
GLUCOSE UR STRIP.AUTO-MCNC: NEGATIVE MG/DL
HCT VFR BLD AUTO: 43.6 % (ref 42–52)
HGB BLD-MCNC: 14.8 G/DL (ref 14–18)
IMM GRANULOCYTES # BLD AUTO: 0.05 K/UL (ref 0–0.11)
IMM GRANULOCYTES NFR BLD AUTO: 0.8 % (ref 0–0.9)
INR PPP: 1.08 (ref 0.87–1.13)
KETONES UR STRIP.AUTO-MCNC: NEGATIVE MG/DL
LEUKOCYTE ESTERASE UR QL STRIP.AUTO: NEGATIVE
LYMPHOCYTES # BLD AUTO: 1.54 K/UL (ref 1–4.8)
LYMPHOCYTES NFR BLD: 23.6 % (ref 22–41)
MCH RBC QN AUTO: 32.3 PG (ref 27–33)
MCHC RBC AUTO-ENTMCNC: 33.9 G/DL (ref 33.7–35.3)
MCV RBC AUTO: 95.2 FL (ref 81.4–97.8)
MICRO URNS: NORMAL
MONOCYTES # BLD AUTO: 0.66 K/UL (ref 0–0.85)
MONOCYTES NFR BLD AUTO: 10.1 % (ref 0–13.4)
NEUTROPHILS # BLD AUTO: 4.13 K/UL (ref 1.82–7.42)
NEUTROPHILS NFR BLD: 63.2 % (ref 44–72)
NITRITE UR QL STRIP.AUTO: NEGATIVE
NRBC # BLD AUTO: 0 K/UL
NRBC BLD-RTO: 0 /100 WBC
PH UR STRIP.AUTO: 6 [PH] (ref 5–8)
PLATELET # BLD AUTO: 277 K/UL (ref 164–446)
PMV BLD AUTO: 10.6 FL (ref 9–12.9)
PROT UR QL STRIP: NEGATIVE MG/DL
PROTHROMBIN TIME: 13.9 SEC (ref 12–14.6)
RBC # BLD AUTO: 4.58 M/UL (ref 4.7–6.1)
RBC UR QL AUTO: NEGATIVE
SP GR UR STRIP.AUTO: 1.02
UROBILINOGEN UR STRIP.AUTO-MCNC: 0.2 MG/DL
WBC # BLD AUTO: 6.5 K/UL (ref 4.8–10.8)

## 2023-03-01 PROCEDURE — 93010 ELECTROCARDIOGRAM REPORT: CPT | Performed by: INTERNAL MEDICINE

## 2023-03-01 PROCEDURE — 85610 PROTHROMBIN TIME: CPT

## 2023-03-01 PROCEDURE — 87086 URINE CULTURE/COLONY COUNT: CPT

## 2023-03-01 PROCEDURE — 81003 URINALYSIS AUTO W/O SCOPE: CPT

## 2023-03-01 PROCEDURE — 85025 COMPLETE CBC W/AUTO DIFF WBC: CPT

## 2023-03-01 PROCEDURE — 36415 COLL VENOUS BLD VENIPUNCTURE: CPT

## 2023-03-01 PROCEDURE — 93005 ELECTROCARDIOGRAM TRACING: CPT

## 2023-03-03 LAB
BACTERIA UR CULT: NORMAL
SIGNIFICANT IND 70042: NORMAL
SITE SITE: NORMAL
SOURCE SOURCE: NORMAL

## 2023-03-04 SDOH — HEALTH STABILITY: PHYSICAL HEALTH: ON AVERAGE, HOW MANY DAYS PER WEEK DO YOU ENGAGE IN MODERATE TO STRENUOUS EXERCISE (LIKE A BRISK WALK)?: 4 DAYS

## 2023-03-04 SDOH — ECONOMIC STABILITY: HOUSING INSECURITY: IN THE LAST 12 MONTHS, HOW MANY PLACES HAVE YOU LIVED?: 1

## 2023-03-04 SDOH — ECONOMIC STABILITY: FOOD INSECURITY: WITHIN THE PAST 12 MONTHS, YOU WORRIED THAT YOUR FOOD WOULD RUN OUT BEFORE YOU GOT MONEY TO BUY MORE.: NEVER TRUE

## 2023-03-04 SDOH — ECONOMIC STABILITY: INCOME INSECURITY: IN THE LAST 12 MONTHS, WAS THERE A TIME WHEN YOU WERE NOT ABLE TO PAY THE MORTGAGE OR RENT ON TIME?: NO

## 2023-03-04 SDOH — ECONOMIC STABILITY: FOOD INSECURITY: WITHIN THE PAST 12 MONTHS, THE FOOD YOU BOUGHT JUST DIDN'T LAST AND YOU DIDN'T HAVE MONEY TO GET MORE.: NEVER TRUE

## 2023-03-04 SDOH — ECONOMIC STABILITY: INCOME INSECURITY: HOW HARD IS IT FOR YOU TO PAY FOR THE VERY BASICS LIKE FOOD, HOUSING, MEDICAL CARE, AND HEATING?: NOT HARD AT ALL

## 2023-03-04 SDOH — HEALTH STABILITY: PHYSICAL HEALTH: ON AVERAGE, HOW MANY MINUTES DO YOU ENGAGE IN EXERCISE AT THIS LEVEL?: 90 MIN

## 2023-03-04 ASSESSMENT — SOCIAL DETERMINANTS OF HEALTH (SDOH)
DO YOU BELONG TO ANY CLUBS OR ORGANIZATIONS SUCH AS CHURCH GROUPS UNIONS, FRATERNAL OR ATHLETIC GROUPS, OR SCHOOL GROUPS?: YES
HOW OFTEN DO YOU HAVE A DRINK CONTAINING ALCOHOL: 2-3 TIMES A WEEK
HOW MANY DRINKS CONTAINING ALCOHOL DO YOU HAVE ON A TYPICAL DAY WHEN YOU ARE DRINKING: 1 OR 2
HOW OFTEN DO YOU HAVE SIX OR MORE DRINKS ON ONE OCCASION: NEVER
HOW OFTEN DO YOU ATTEND CHURCH OR RELIGIOUS SERVICES?: MORE THAN 4 TIMES PER YEAR
DO YOU BELONG TO ANY CLUBS OR ORGANIZATIONS SUCH AS CHURCH GROUPS UNIONS, FRATERNAL OR ATHLETIC GROUPS, OR SCHOOL GROUPS?: YES
HOW OFTEN DO YOU ATTENT MEETINGS OF THE CLUB OR ORGANIZATION YOU BELONG TO?: MORE THAN 4 TIMES PER YEAR
HOW OFTEN DO YOU ATTEND CHURCH OR RELIGIOUS SERVICES?: MORE THAN 4 TIMES PER YEAR
IN A TYPICAL WEEK, HOW MANY TIMES DO YOU TALK ON THE PHONE WITH FAMILY, FRIENDS, OR NEIGHBORS?: MORE THAN THREE TIMES A WEEK
HOW OFTEN DO YOU GET TOGETHER WITH FRIENDS OR RELATIVES?: ONCE A WEEK
HOW OFTEN DO YOU ATTENT MEETINGS OF THE CLUB OR ORGANIZATION YOU BELONG TO?: MORE THAN 4 TIMES PER YEAR
HOW OFTEN DO YOU GET TOGETHER WITH FRIENDS OR RELATIVES?: ONCE A WEEK
HOW HARD IS IT FOR YOU TO PAY FOR THE VERY BASICS LIKE FOOD, HOUSING, MEDICAL CARE, AND HEATING?: NOT HARD AT ALL
IN A TYPICAL WEEK, HOW MANY TIMES DO YOU TALK ON THE PHONE WITH FAMILY, FRIENDS, OR NEIGHBORS?: MORE THAN THREE TIMES A WEEK
WITHIN THE PAST 12 MONTHS, YOU WORRIED THAT YOUR FOOD WOULD RUN OUT BEFORE YOU GOT THE MONEY TO BUY MORE: NEVER TRUE

## 2023-03-04 ASSESSMENT — LIFESTYLE VARIABLES
HOW MANY STANDARD DRINKS CONTAINING ALCOHOL DO YOU HAVE ON A TYPICAL DAY: 1 OR 2
HOW OFTEN DO YOU HAVE A DRINK CONTAINING ALCOHOL: 2-3 TIMES A WEEK
SKIP TO QUESTIONS 9-10: 1
AUDIT-C TOTAL SCORE: 3
HOW OFTEN DO YOU HAVE SIX OR MORE DRINKS ON ONE OCCASION: NEVER

## 2023-03-06 ENCOUNTER — OFFICE VISIT (OUTPATIENT)
Dept: MEDICAL GROUP | Facility: MEDICAL CENTER | Age: 78
End: 2023-03-06
Payer: MEDICARE

## 2023-03-06 VITALS
OXYGEN SATURATION: 97 % | SYSTOLIC BLOOD PRESSURE: 120 MMHG | WEIGHT: 196 LBS | HEIGHT: 71 IN | BODY MASS INDEX: 27.44 KG/M2 | HEART RATE: 66 BPM | TEMPERATURE: 98 F | DIASTOLIC BLOOD PRESSURE: 68 MMHG

## 2023-03-06 DIAGNOSIS — I70.0 ATHEROSCLEROSIS OF AORTA (HCC): ICD-10-CM

## 2023-03-06 DIAGNOSIS — I70.1 RENAL ARTERY STENOSIS (HCC): ICD-10-CM

## 2023-03-06 DIAGNOSIS — N18.32 STAGE 3B CHRONIC KIDNEY DISEASE: ICD-10-CM

## 2023-03-06 DIAGNOSIS — I10 ESSENTIAL HYPERTENSION: ICD-10-CM

## 2023-03-06 DIAGNOSIS — E78.5 DYSLIPIDEMIA: ICD-10-CM

## 2023-03-06 DIAGNOSIS — N52.8 OTHER MALE ERECTILE DYSFUNCTION: ICD-10-CM

## 2023-03-06 PROBLEM — U07.1 COVID-19: Status: RESOLVED | Noted: 2023-01-19 | Resolved: 2023-03-06

## 2023-03-06 PROCEDURE — G0439 PPPS, SUBSEQ VISIT: HCPCS | Performed by: FAMILY MEDICINE

## 2023-03-06 RX ORDER — NIRMATRELVIR AND RITONAVIR 300-100 MG
KIT ORAL
COMMUNITY
Start: 2023-01-19 | End: 2023-03-06

## 2023-03-06 ASSESSMENT — ENCOUNTER SYMPTOMS: GENERAL WELL-BEING: EXCELLENT

## 2023-03-06 ASSESSMENT — PATIENT HEALTH QUESTIONNAIRE - PHQ9: CLINICAL INTERPRETATION OF PHQ2 SCORE: 0

## 2023-03-06 ASSESSMENT — FIBROSIS 4 INDEX: FIB4 SCORE: 1.37

## 2023-03-06 ASSESSMENT — ACTIVITIES OF DAILY LIVING (ADL): BATHING_REQUIRES_ASSISTANCE: 0

## 2023-03-06 NOTE — PROGRESS NOTES
Chief Complaint   Patient presents with    Annual Wellness Visit       HPI:  Honorio is a 78 y.o. here for Medicare Annual Wellness Visit    Stage 3b chronic kidney disease (HCC)  Chronic problem. GFR had slight worsening.  Patient is already followed by urology for history of renal stones and bladder stones.  He would like to see a new nephrologist.    Essential hypertension  Chronic problem. Well controlled on amlodpine and losartan.     Dyslipidemia  Chronic problem. LDL improving. Continues on lovastatin 10mg.  Reviewed lipid panel from 2/27/2023 today.    Atherosclerosis of aorta (HCC)  Chronic problem, noted incidentally, asymptomatic.  Currently on a statin.    Renal artery stenosis (HCC), right  Chronic problem. Stable. Blood pressure under good control. GFR worsening a bit. Would like to get reestablished with a new nephrologist.     Other male erectile dysfunction  Chronic problem, patient has history of erectile dysfunction.  Has prescription for sildenafil as needed, this works well. Does not need refill today.      Patient Active Problem List    Diagnosis Date Noted    Urinary bladder stone 10/27/2022    Atrophy of kidney 03/02/2022    Gastroesophageal reflux disease without esophagitis 02/07/2022    Other male erectile dysfunction 01/04/2021    Atherosclerosis of aorta (HCC) 05/21/2020    Renal artery stenosis (HCC), right 10/30/2018    Stage 3b chronic kidney disease (HCC) 09/19/2018    Bilateral hearing loss due to cerumen impaction 08/14/2018    Essential hypertension 02/17/2017    Chronic nonseasonal allergic rhinitis due to pollen 02/17/2017    BPH with obstruction/lower urinary tract symptoms 02/17/2017    Dyslipidemia 02/17/2017    Kidney calculus 01/16/2017       Current Outpatient Medications   Medication Sig Dispense Refill    fenofibrate (TRIGLIDE) 160 MG tablet TAKE ONE TABLET BY MOUTH ONE TIME DAILY 90 Tablet 0    fluticasone (FLONASE) 50 MCG/ACT nasal spray USE 2 SPRAYS IN EACH NOSTRIL ONCE  DAILY. 48 g 0    losartan (COZAAR) 100 MG Tab Take 1 Tablet by mouth every day. 100 Tablet 3    amLODIPine (NORVASC) 2.5 MG Tab TAKE ONE TABLET BY MOUTH ONE TIME DAILY 90 Tablet 0    lovastatin (MEVACOR) 10 MG tablet TAKE ONE TABLET BY MOUTH EVERY EVENING 100 Tablet 3    pantoprazole (PROTONIX) 20 MG tablet       tamsulosin (FLOMAX) 0.4 MG capsule Take 1 Cap by mouth ONE-HALF HOUR AFTER BREAKFAST. 100 Cap 3    coenzyme Q-10 30 MG capsule Take 60 mg by mouth every day.      Multiple Vitamin (MULTI VITAMIN PO) Take 1 Tab by mouth every day.      Cyanocobalamin (VITAMIN B-12) 5000 MCG SL Tab Place 1 Tab under tongue every day.      Cholecalciferol (VITAMIN D3) 5000 UNITS Cap Take 1 Cap by mouth every day.      Nirmatrelvir&Ritonavir 150/100 (PAXLOVID, 150/100,) 10 x 150 MG & 10 x 100MG Tablet Therapy Pack Nirmatrelvir 1 150mg tab po BID x 5 days /Ritonovir 1 100mg tab po BID x 5 days. (Reduced dose for GFR of 42) 20 Each 0    sildenafil (REVATIO) 20 MG tablet sildenafil (pulmonary hypertension) 20 mg tablet   Take 2 tablets as needed by oral route as needed for 30 days. (Patient not taking: Reported on 3/1/2023)       No current facility-administered medications for this visit.        Patient is taking medications as noted in medication list.  Current supplements as per medication list.     Allergies: Flu virus vaccine and Prilosec [omeprazole]    Current social contact/activities: Family/Friends     Is patient current with immunizations? Yes.    He  reports that he quit smoking about 36 years ago. His smoking use included cigarettes. He has a 15.00 pack-year smoking history. He has never used smokeless tobacco. He reports current alcohol use of about 0.6 oz per week. He reports that he does not use drugs.  Counseling given: Not Answered      ROS:    Gait: Uses no assistive device   Ostomy: No   Other tubes: No   Amputations: No   Chronic oxygen Yes  Wears hearing aids: No   : Denies any urinary leakage during the  last 6 months    Screening:    Depression Screening  Little interest or pleasure in doing things?  0 - not at all  Feeling down, depressed, or hopeless? 0 - not at all  Patient Health Questionnaire Score: 0    If depressive symptoms identified deferred to follow up visit unless specifically addressed in assessment and plan.    Interpretation of PHQ-9 Total Score   Score Severity   1-4 No Depression   5-9 Mild Depression   10-14 Moderate Depression   15-19 Moderately Severe Depression   20-27 Severe Depression    Screening for Cognitive Impairment  Three Minute Recall (daughter, heaven, mountain)  3/3    Santhosh clock face with all 12 numbers and set the hands to show 10 past 11.  Yes    If cognitive concerns identified, deferred for follow up unless specifically addressed in assessment and plan.    Fall Risk Assessment  Has the patient had two or more falls in the last year or any fall with injury in the last year?  No  If fall risk identified, deferred for follow up unless specifically addressed in assessment and plan.    Safety Assessment  Throw rugs on floor.  No  Handrails on all stairs.  No  Good lighting in all hallways.  Yes  Difficulty hearing.  No  Patient counseled about all safety risks that were identified.    Functional Assessment ADLs  Are there any barriers preventing you from cooking for yourself or meeting nutritional needs?  No.    Are there any barriers preventing you from driving safely or obtaining transportation?  No.    Are there any barriers preventing you from using a telephone or calling for help?  No.    Are there any barriers preventing you from shopping?  No.    Are there any barriers preventing you from taking care of your own finances?  No.    Are there any barriers preventing you from managing your medications?  No.    Are there any barriers preventing you from showering, bathing or dressing yourself?  No.    Are you currently engaging in any exercise or physical activity?  Yes.  3x a  week   What is your perception of your health?  Excellent.    Advance Care Planning  Do you have an Advance Directive, Living Will, Durable Power of , or POLST? Yes  Advance Directive       is on file    Health Maintenance Summary            Overdue - Annual Wellness Visit (Every 366 Days) Overdue since 2/8/2023 02/07/2022  Done    11/21/2019  Subsequent Annual Wellness Visit - Includes PPPS ()    11/21/2019  Visit Dx: Medicare annual wellness visit, subsequent    01/23/2018  Visit Dx: Medicare annual wellness visit, initial              Postponed - IMM ZOSTER VACCINES (1 of 2) Postponed until 10/31/2030      No completion history exists for this topic.              IMM DTaP/Tdap/Td Vaccine (2 - Td or Tdap) Next due on 1/23/2028 01/23/2018  Imm Admin: Tdap Vaccine              HEPATITIS C SCREENING  Completed      05/16/2020  HEP C VIRUS ANTIBODY              COVID-19 Vaccine (Series Information) Completed      10/03/2022  Imm Admin: PFIZER BIVALENT BOOSTER SARS-COV-2 VACCINE (12+)    10/18/2021  Imm Admin: PFIZER PURPLE CAP SARS-COV-2 VACCINATION (12+)    02/18/2021  Imm Admin: PFIZER PURPLE CAP SARS-COV-2 VACCINATION (12+)    01/26/2021  Imm Admin: PFIZER PURPLE CAP SARS-COV-2 VACCINATION (12+)              IMM INFLUENZA (Series Information) Completed      10/10/2022  Imm Admin: Influenza Vaccine Adult HD    09/24/2021  Imm Admin: Influenza, Unspecified - HISTORICAL DATA    08/31/2020  Imm Admin: Influenza Vaccine Quad Inj (Pf)    10/10/2019  Imm Admin: Influenza Vaccine Quad Inj (Pf)    09/19/2018  Imm Admin: Influenza Vaccine Adult HD    Only the first 5 history entries have been loaded, but more history exists.              IMM PNEUMOCOCCAL VACCINE: 65+ Years (Series Information) Completed      10/10/2022  Imm Admin: Pneumococcal Conjugate Vaccine (PCV20)    09/19/2018  Imm Admin: Pneumococcal polysaccharide vaccine (PPSV-23)    06/12/2017  Imm Admin: Pneumococcal Conjugate Vaccine  (Prevnar/PCV-13)              IMM HEP B VACCINE (Series Information) Aged Out      No completion history exists for this topic.              IMM MENINGOCOCCAL ACWY VACCINE (Series Information) Aged Out      No completion history exists for this topic.              Discontinued - COLORECTAL CANCER SCREENING  Discontinued        Frequency changed to Never automatically (Topic No Longer Applies)    2021  OCCULT BLOOD FECES IMMUNOASSAY    2021  REFERRAL TO GI FOR COLONOSCOPY    2017  REFERRAL TO GI FOR COLONOSCOPY              Discontinued - ABDOMINAL AORTIC ANEURYSM (AAA) SCREEN  Discontinued        Frequency changed to Never automatically (Topic No Longer Applies)    2022  Outside Procedure: [place] US, RETROPERITNL ABD,  LTD    2016  CT-RENAL COLIC EVALUATION(A/P W/O)                    Patient Care Team:  Maria M Khan M.D. as PCP - General (Family Medicine)  Sol Tadeo M.D. as PCP - [facility] Paneled  Emil Addison M.D. as Consulting Physician (Urology)  Javier Lopez O.D. as Consulting Physician (Optometry)  [facility] (Inactive) as Consulting Physician (Gastroenterology)  Thom Garcia M.D. as Consulting Physician (Gastroenterology)  Barbie Vazquez M.D. (Nephrology)    Social History     Tobacco Use    Smoking status: Former     Packs/day: 1.00     Years: 15.00     Pack years: 15.00     Types: Cigarettes     Quit date: 1987     Years since quittin.2    Smokeless tobacco: Never   Vaping Use    Vaping Use: Never used   Substance Use Topics    Alcohol use: Yes     Alcohol/week: 0.6 oz     Types: 1 Standard drinks or equivalent per week     Comment: 3-4 per week    Drug use: No     Family History   Problem Relation Age of Onset    Cancer Mother         . Complications of colon cancer    Heart Disease Brother     No Known Problems Maternal Grandmother     Hypertension Maternal Grandfather      He  has a past medical history of Heart burn (2010), [place]  "hernia syndrome (2010), High cholesterol (2007), Hyperlipidemia, Hypertension, Indigestion (2010), Kidney stone, Other male erectile dysfunction (01/04/2021), and Urinary bladder disorder (2011).    He has no past medical history of Acute nasopharyngitis, Anesthesia, Anginal syndrome (HCC), Arrhythmia, Arthritis, Asthma, Blood clotting disorder (HCC), Bowel habit changes, Breath shortness, Bronchitis, Cancer (HCC), Carcinoma in situ of respiratory system, Cataract, Congestive heart failure (HCC), Continuous ambulatory peritoneal dialysis status (HCC), Coughing blood, Dental disorder, Diabetes (HCC), Dialysis patient (HCC), Disorder of thyroid, Emphysema of lung (HCC), Glaucoma, Gynecological disorder, Heart murmur, Heart valve disease, Hemorrhagic disorder (HCC), Hepatitis A, Hepatitis B, Hepatitis C, Infectious disease, Jaundice, Myocardial infarct (HCC), Pacemaker, Pain, Pneumonia, Pregnant, Psychiatric problem, Rheumatic fever, Seizure (HCC), Sleep apnea, Snoring, Stroke (HCC), Tuberculosis, or Urinary incontinence.   Past Surgical History:   Procedure Laterality Date    INGUINAL HERNIA REPAIR ROBOTIC Left 02/22/2019    Procedure: INGUINAL HERNIA REPAIR ROBOTIC - W/MESH PLACEMENT;  Surgeon: Washington Alonso M.D.;  Location: SURGERY SAME DAY [facility];  Service: General    URETEROSCOPY Left 01/16/2017    Procedure: URETEROSCOPY;  Surgeon: Emil Addison M.D.;  Location: SURGERY [facility];  Service:     LASERTRIPSY  01/16/2017    Procedure: LASERTRIPSY - LITHO;  Surgeon: Emil Addison M.D.;  Location: SURGERY [facility];  Service:     OTHER ORTHOPEDIC SURGERY  2011    left knee meniscus    OTHER  [id number]    kidney stone     OTHER ABDOMINAL SURGERY  2018    Hernia surgery       Exam:   /68 (BP Location: Right arm, Patient Position: Sitting, BP Cuff Size: Adult long)   Pulse 66   Temp 36.7 °C (98 °F) (Temporal)   Ht 1.803 m (5' 11\")   Wt 88.9 kg (196 lb)   SpO2 97%  Body mass index is 27.34 " kg/m².    Hearing excellent.    Dentition good  Alert, oriented in no acute distress.  Eye contact is good, speech goal directed, affect calm      Assessment and Plan. The following treatment and monitoring plan is recommended:      1. Stage 3b chronic kidney disease (HCC)  Referral to Nephrology      2. Essential hypertension        3. Dyslipidemia        4. Atherosclerosis of aorta (HCC)        5. Renal artery stenosis (HCC), right  Referral to Nephrology      6. Other male erectile dysfunction           Overall the patient is doing quite well.  Up-to-date on immunizations and cancer screenings.  Reviewed labs today.  He does have some mild worsening in his GFR with a history of renal artery stenosis.  New referral placed to Dr. Sanchez per patient's request. Also working with urology.   He remains quite active with healthy diet.     Services suggested: No services needed at this time  Health Care Screening recommendations as per orders if indicated.  Referrals offered: PT/OT/Nutrition counseling/Behavioral Health/Smoking cessation as per orders if indicated.    Discussion today about general wellness and lifestyle habits:    Prevent falls and reduce trip hazards; Cautioned about securing or removing rugs.  Have a working fire alarm and carbon monoxide detector;   Engage in regular physical activity and social activities     Follow-up: Return if symptoms worsen or fail to improve.

## 2023-03-06 NOTE — ASSESSMENT & PLAN NOTE
Chronic problem, patient has history of erectile dysfunction.  Has prescription for sildenafil as needed, this works well. Does not need refill today.

## 2023-03-06 NOTE — ASSESSMENT & PLAN NOTE
Chronic problem. Stable. Blood pressure under good control. GFR worsening a bit. Would like to get reestablished with a new nephrologist.

## 2023-03-08 ENCOUNTER — HOSPITAL ENCOUNTER (OUTPATIENT)
Facility: MEDICAL CENTER | Age: 78
End: 2023-03-08
Attending: UROLOGY | Admitting: UROLOGY
Payer: MEDICARE

## 2023-03-08 ENCOUNTER — ANESTHESIA (OUTPATIENT)
Dept: SURGERY | Facility: MEDICAL CENTER | Age: 78
End: 2023-03-08
Payer: MEDICARE

## 2023-03-08 ENCOUNTER — ANESTHESIA EVENT (OUTPATIENT)
Dept: SURGERY | Facility: MEDICAL CENTER | Age: 78
End: 2023-03-08
Payer: MEDICARE

## 2023-03-08 VITALS
DIASTOLIC BLOOD PRESSURE: 69 MMHG | HEIGHT: 71 IN | TEMPERATURE: 96.8 F | HEART RATE: 71 BPM | WEIGHT: 194 LBS | RESPIRATION RATE: 16 BRPM | OXYGEN SATURATION: 93 % | BODY MASS INDEX: 27.16 KG/M2 | SYSTOLIC BLOOD PRESSURE: 140 MMHG

## 2023-03-08 PROCEDURE — 160028 HCHG SURGERY MINUTES - 1ST 30 MINS LEVEL 3: Performed by: UROLOGY

## 2023-03-08 PROCEDURE — 160046 HCHG PACU - 1ST 60 MINS PHASE II: Performed by: UROLOGY

## 2023-03-08 PROCEDURE — 700105 HCHG RX REV CODE 258: Performed by: UROLOGY

## 2023-03-08 PROCEDURE — 88300 SURGICAL PATH GROSS: CPT

## 2023-03-08 PROCEDURE — 160025 RECOVERY II MINUTES (STATS): Performed by: UROLOGY

## 2023-03-08 PROCEDURE — 99100 ANES PT EXTEME AGE<1 YR&>70: CPT | Performed by: ANESTHESIOLOGY

## 2023-03-08 PROCEDURE — 160002 HCHG RECOVERY MINUTES (STAT): Performed by: UROLOGY

## 2023-03-08 PROCEDURE — C1769 GUIDE WIRE: HCPCS | Performed by: UROLOGY

## 2023-03-08 PROCEDURE — 00918 ANES TRURL PX URTRL CAL RMVL: CPT | Performed by: ANESTHESIOLOGY

## 2023-03-08 PROCEDURE — 160035 HCHG PACU - 1ST 60 MINS PHASE I: Performed by: UROLOGY

## 2023-03-08 PROCEDURE — 700101 HCHG RX REV CODE 250: Performed by: ANESTHESIOLOGY

## 2023-03-08 PROCEDURE — 160009 HCHG ANES TIME/MIN: Performed by: UROLOGY

## 2023-03-08 PROCEDURE — 700105 HCHG RX REV CODE 258: Performed by: ANESTHESIOLOGY

## 2023-03-08 PROCEDURE — 160048 HCHG OR STATISTICAL LEVEL 1-5: Performed by: UROLOGY

## 2023-03-08 PROCEDURE — 700111 HCHG RX REV CODE 636 W/ 250 OVERRIDE (IP): Performed by: ANESTHESIOLOGY

## 2023-03-08 PROCEDURE — 160039 HCHG SURGERY MINUTES - EA ADDL 1 MIN LEVEL 3: Performed by: UROLOGY

## 2023-03-08 RX ORDER — ONDANSETRON 2 MG/ML
4 INJECTION INTRAMUSCULAR; INTRAVENOUS
Status: DISCONTINUED | OUTPATIENT
Start: 2023-03-08 | End: 2023-03-08 | Stop reason: HOSPADM

## 2023-03-08 RX ORDER — EPHEDRINE SULFATE 50 MG/ML
INJECTION, SOLUTION INTRAVENOUS PRN
Status: DISCONTINUED | OUTPATIENT
Start: 2023-03-08 | End: 2023-03-08 | Stop reason: SURG

## 2023-03-08 RX ORDER — HYDROMORPHONE HYDROCHLORIDE 1 MG/ML
0.2 INJECTION, SOLUTION INTRAMUSCULAR; INTRAVENOUS; SUBCUTANEOUS
Status: DISCONTINUED | OUTPATIENT
Start: 2023-03-08 | End: 2023-03-08 | Stop reason: HOSPADM

## 2023-03-08 RX ORDER — SODIUM CHLORIDE, SODIUM LACTATE, POTASSIUM CHLORIDE, CALCIUM CHLORIDE 600; 310; 30; 20 MG/100ML; MG/100ML; MG/100ML; MG/100ML
INJECTION, SOLUTION INTRAVENOUS
Status: DISCONTINUED | OUTPATIENT
Start: 2023-03-08 | End: 2023-03-08 | Stop reason: SURG

## 2023-03-08 RX ORDER — HALOPERIDOL 5 MG/ML
1 INJECTION INTRAMUSCULAR
Status: DISCONTINUED | OUTPATIENT
Start: 2023-03-08 | End: 2023-03-08 | Stop reason: HOSPADM

## 2023-03-08 RX ORDER — HYDRALAZINE HYDROCHLORIDE 20 MG/ML
5 INJECTION INTRAMUSCULAR; INTRAVENOUS
Status: DISCONTINUED | OUTPATIENT
Start: 2023-03-08 | End: 2023-03-08 | Stop reason: HOSPADM

## 2023-03-08 RX ORDER — DIPHENHYDRAMINE HYDROCHLORIDE 50 MG/ML
12.5 INJECTION INTRAMUSCULAR; INTRAVENOUS
Status: DISCONTINUED | OUTPATIENT
Start: 2023-03-08 | End: 2023-03-08 | Stop reason: HOSPADM

## 2023-03-08 RX ORDER — OXYCODONE HCL 5 MG/5 ML
5 SOLUTION, ORAL ORAL
Status: DISCONTINUED | OUTPATIENT
Start: 2023-03-08 | End: 2023-03-08 | Stop reason: HOSPADM

## 2023-03-08 RX ORDER — HYDROMORPHONE HYDROCHLORIDE 1 MG/ML
0.4 INJECTION, SOLUTION INTRAMUSCULAR; INTRAVENOUS; SUBCUTANEOUS
Status: DISCONTINUED | OUTPATIENT
Start: 2023-03-08 | End: 2023-03-08 | Stop reason: HOSPADM

## 2023-03-08 RX ORDER — OXYCODONE HCL 5 MG/5 ML
10 SOLUTION, ORAL ORAL
Status: DISCONTINUED | OUTPATIENT
Start: 2023-03-08 | End: 2023-03-08 | Stop reason: HOSPADM

## 2023-03-08 RX ORDER — CEFAZOLIN SODIUM 1 G/3ML
INJECTION, POWDER, FOR SOLUTION INTRAMUSCULAR; INTRAVENOUS PRN
Status: DISCONTINUED | OUTPATIENT
Start: 2023-03-08 | End: 2023-03-08 | Stop reason: SURG

## 2023-03-08 RX ORDER — SODIUM CHLORIDE, SODIUM LACTATE, POTASSIUM CHLORIDE, CALCIUM CHLORIDE 600; 310; 30; 20 MG/100ML; MG/100ML; MG/100ML; MG/100ML
INJECTION, SOLUTION INTRAVENOUS CONTINUOUS
Status: ACTIVE | OUTPATIENT
Start: 2023-03-08 | End: 2023-03-08

## 2023-03-08 RX ORDER — SODIUM CHLORIDE, SODIUM GLUCONATE, SODIUM ACETATE, POTASSIUM CHLORIDE AND MAGNESIUM CHLORIDE 526; 502; 368; 37; 30 MG/100ML; MG/100ML; MG/100ML; MG/100ML; MG/100ML
500 INJECTION, SOLUTION INTRAVENOUS CONTINUOUS
Status: DISCONTINUED | OUTPATIENT
Start: 2023-03-08 | End: 2023-03-08 | Stop reason: HOSPADM

## 2023-03-08 RX ORDER — HYDROMORPHONE HYDROCHLORIDE 1 MG/ML
0.6 INJECTION, SOLUTION INTRAMUSCULAR; INTRAVENOUS; SUBCUTANEOUS
Status: DISCONTINUED | OUTPATIENT
Start: 2023-03-08 | End: 2023-03-08 | Stop reason: HOSPADM

## 2023-03-08 RX ADMIN — FENTANYL CITRATE 50 MCG: 50 INJECTION, SOLUTION INTRAMUSCULAR; INTRAVENOUS at 15:34

## 2023-03-08 RX ADMIN — PROPOFOL 150 MG: 10 INJECTION, EMULSION INTRAVENOUS at 14:43

## 2023-03-08 RX ADMIN — SODIUM CHLORIDE, POTASSIUM CHLORIDE, SODIUM LACTATE AND CALCIUM CHLORIDE: 600; 310; 30; 20 INJECTION, SOLUTION INTRAVENOUS at 14:41

## 2023-03-08 RX ADMIN — CEFAZOLIN 2 G: 330 INJECTION, POWDER, FOR SOLUTION INTRAMUSCULAR; INTRAVENOUS at 14:45

## 2023-03-08 RX ADMIN — EPHEDRINE SULFATE 10 MG: 50 INJECTION, SOLUTION INTRAVENOUS at 15:36

## 2023-03-08 RX ADMIN — FENTANYL CITRATE 100 MCG: 50 INJECTION, SOLUTION INTRAMUSCULAR; INTRAVENOUS at 14:41

## 2023-03-08 RX ADMIN — SODIUM CHLORIDE, POTASSIUM CHLORIDE, SODIUM LACTATE AND CALCIUM CHLORIDE: 600; 310; 30; 20 INJECTION, SOLUTION INTRAVENOUS at 12:13

## 2023-03-08 RX ADMIN — FENTANYL CITRATE 50 MCG: 50 INJECTION, SOLUTION INTRAMUSCULAR; INTRAVENOUS at 15:52

## 2023-03-08 ASSESSMENT — PAIN SCALES - GENERAL: PAIN_LEVEL: 0

## 2023-03-08 ASSESSMENT — FIBROSIS 4 INDEX: FIB4 SCORE: 1.37

## 2023-03-08 NOTE — ANESTHESIA PROCEDURE NOTES
Airway    Date/Time: 3/8/2023 2:44 PM  Performed by: Ronal Johnson M.D.  Authorized by: Ronal Johnson M.D.     Location:  OR  Urgency:  Elective  Indications for Airway Management:  Anesthesia      Spontaneous Ventilation: absent    Sedation Level:  Deep  Preoxygenated: Yes    Final Airway Type:  Supraglottic airway  Final Supraglottic Airway:  Standard LMA    SGA Size:  4  Number of Attempts at Approach:  1

## 2023-03-08 NOTE — ANESTHESIA PREPROCEDURE EVALUATION
Case: [id number] Date/Time: 03/08/23 1426    Procedures:       CYSTOSCOPY AND LASER LITHOTRIPSY OF BLADDER STONES (Bladder)      LITHOTRIPSY, USING LASER (Bladder)    Anesthesia type: General    Pre-op diagnosis: URINARY BLADDER STONE    Location: [facility] OR 12 / SURGERY [facility]    Surgeons: Emil Addison M.D.          Relevant Problems   CARDIAC   (positive) Atherosclerosis of aorta (HCC)   (positive) Essential hypertension   (positive) Renal artery stenosis (HCC), right      GI   (positive) Gastroesophageal reflux disease without esophagitis         (positive) Atrophy of kidney   (positive) Kidney calculus   (positive) Stage 3b chronic kidney disease (HCC)       Physical Exam    Airway   Mallampati: II  TM distance: >3 FB  Neck ROM: full       Cardiovascular - normal exam  Rhythm: regular  Rate: normal  (-) murmur     Dental - normal exam           Pulmonary - normal exam  Breath sounds clear to auscultation     Abdominal    Neurological - normal exam                 Anesthesia Plan    ASA 2       Plan - general       Airway plan will be LMA          Induction: intravenous    Postoperative Plan: Postoperative administration of opioids is intended.    Pertinent diagnostic labs and testing reviewed    Informed Consent:    Anesthetic plan and risks discussed with patient.    Use of blood products discussed with: patient whom consented to blood products.

## 2023-03-09 LAB — PATHOLOGY CONSULT NOTE: NORMAL

## 2023-03-09 NOTE — DISCHARGE INSTRUCTIONS
HOME CARE INSTRUCTIONS    ACTIVITY: Rest and take it easy for the first 24 hours.  A responsible adult is recommended to remain with you during that time.  It is normal to feel sleepy.  We encourage you to not do anything that requires balance, judgment or coordination.    FOR 24 HOURS DO NOT:  Drive, operate machinery or run household appliances.  Drink beer or alcoholic beverages.  Make important decisions or sign legal documents.    SPECIAL INSTRUCTIONS:     No discharge medications are needed.    Follow-up office with Dr. Addison in 3-4 weeks.    Activity: No restrictions    DIET: To avoid nausea, slowly advance diet as tolerated, avoiding spicy or greasy foods for the first day.  Add more substantial food to your diet according to your physician's instructions. INCREASE FLUIDS AND FIBER TO AVOID CONSTIPATION.    SURGICAL DRESSING/BATHING: you may shower tomorrow    MEDICATIONS: Resume taking daily medication.  Take prescribed pain medication with food.  If no medication is prescribed, you may take non-aspirin pain medication if needed.  PAIN MEDICATION CAN BE VERY CONSTIPATING.  Take a stool softener or laxative such as senokot, pericolace, or milk of magnesia if needed.    Prescription given for none.  Last pain medication given at none.    A follow-up appointment should be arranged with your doctor in 3-4 weeks; call to schedule.    You should CALL YOUR PHYSICIAN if you develop:  Fever greater than 101 degrees F.  Pain not relieved by medication, or persistent nausea or vomiting.  Excessive bleeding (blood soaking through dressing) or unexpected drainage from the wound.  Extreme redness or swelling around the incision site, drainage of pus or foul smelling drainage.  Inability to urinate or empty your bladder within 8 hours.  Problems with breathing or chest pain.    You should call 911 if you develop problems with breathing or chest pain.  If you are unable to contact your doctor or surgical center, you should  go to the nearest emergency room or urgent care center.  Physician's telephone #: [phone] Dr Addison    MILD FLU-LIKE SYMPTOMS ARE NORMAL.  YOU MAY EXPERIENCE GENERALIZED MUSCLE ACHES, THROAT IRRITATION, HEADACHE AND/OR SOME NAUSEA.    If any questions arise, call your doctor.  If your doctor is not available, please feel free to call the Surgical Center at [phone].  The Center is open Monday through Friday from 7AM to 7PM.      A registered nurse may call you a few days after your surgery to see how you are doing after your procedure.    You may also receive a survey in the mail within the next two weeks and we ask that you take a few moments to complete the survey and return it to us.  Our goal is to provide you with very good care and we value your comments.     Depression / Suicide Risk    As you are discharged from this [facility] facility, it is important to learn how to keep safe from harming yourself.    Recognize the warning signs:  Abrupt changes in personality, positive or negative- including increase in energy   Giving away possessions  Change in eating patterns- significant weight changes-  positive or negative  Change in sleeping patterns- unable to sleep or sleeping all the time   Unwillingness or inability to communicate  Depression  Unusual sadness, discouragement and loneliness  Talk of wanting to die  Neglect of personal appearance   Rebelliousness- reckless behavior  Withdrawal from people/activities they love  Confusion- inability to concentrate     If you or a loved one observes any of these behaviors or has concerns about self-harm, here's what you can do:  Talk about it- your feelings and reasons for harming yourself  Remove any means that you might use to hurt yourself (examples: pills, rope, extension cords, firearm)  Get professional help from the community (Mental Health, Substance Abuse, psychological counseling)  Do not be alone:Call your Safe Contact- someone whom you  trust who will be there for you.  Call your local CRISIS HOTLINE 203-4043 or 048-161-4530  Call your local Children's Mobile Crisis Response Team [place] (729) 164-5144 or www.nScaled  Call the toll free National Suicide Prevention Hotlines   National Suicide Prevention Lifeline 908-817-HVXW (3817)  [facility] 800-WHCKCQD (352-4738)    I acknowledge receipt and understanding of these Home Care instructions.

## 2023-03-09 NOTE — OR SURGEON
Immediate Post OP Note    PreOp Diagnosis: Bladder stones      PostOp Diagnosis: Bladder stones      Procedure(s):  CYSTOSCOPY  LASER LITHOTRIPSY OF BLADDER STONES - Wound Class: Clean      Surgeon(s):  Emil Addison M.D.    Anesthesiologist/Type of Anesthesia:  Anesthesiologist: Ronal Johnson M.D./General LMA    Surgical Staff:  Circulator: Julisa Roberto R.N.  Relief Circulator: Marcy Donato R.N.  Scrub Person: Vidal Bennett; Ronal Sánchez    Specimens removed if any:  ID Type Source Tests Collected by Time Destination   A : BLADDER STONE Other Other PATHOLOGY SPECIMEN Emil Addison M.D. 3/8/2023  3:57 PM        Estimated Blood Loss: N/A    Findings: 2 large bladder stones    Complications: None        3/8/2023 4:04 PM Emil Addison M.D.

## 2023-03-09 NOTE — OP REPORT
DATE OF SERVICE:  03/08/2023     UROLOGIC OPERATIVE REPORT     PREOPERATIVE DIAGNOSES:  1.  Bladder calculi noted on cystourethroscopy.  2.  Minimal lower urinary tract symptoms.  3.  History of gross hematuria.     OPERATIONS AND PROCEDURES PERFORMED:    1.  Rigid cystourethroscopy.  2.  Holmium laser lithotripsy of 2 large bladder stones.     SURGEON:  Emil Addison MD.     ANESTHESIA:  General laryngeal mask.     ANESTHESIOLOGIST:  Ronal Johnson MD.     POSTOPERATIVE DIAGNOSES:  1.  Bladder calculi noted on cystourethroscopy.  2.  Minimal lower urinary tract symptoms.  3.  History of gross hematuria.     COMPLICATIONS:  None.     DRAINS:  None.     SPECIMENS:  Bladder stones to pathology for chemical composition analysis.     INDICATIONS:  The patient is a pleasant patient with a history of lower   urinary tract symptoms, which are mild, but he did have hematuria, was   evaluated with cystourethroscopy and found to have 2 bladder stones.  He had   stones back in [id number] timeline, and I discussed with him the treatment   options and consideration of laser lithotripsy with or without a transurethral   resection of the prostate.  Due to the fact he is overall pleased with his   lower urinary tract function and his postvoid residuals are minimally   elevated, he prefers to have the stones treated and avoid transurethral   resection or other alternatives of the prostate management.  With the stone   treatment, I discussed the risk of the procedure including, but not limited to   risk of perioperative urinary tract infection, urosepsis, risk of urethral   strictures, delayed complication of an instrumentation, risk of bladder   perforation during laser therapy.  There is a risk of need for secondary   procedures and he is aware of the risk of developing periprocedural urinary   retention.  In addition, I discussed the perioperative risk of deep vein   thrombosis, pulmonary embolism, aspiration pneumonia, heart  attack, stroke and   death.  An informed consent was given to me by the patient to proceed.     DESCRIPTION OF PROCEDURE:  After informed consent was obtained, the patient   was brought to the operating room and placed supine.  Bilateral sequential   compression device in place and operational.  A general laryngeal mask   anesthetic was administered in a balanced fashion.  The patient received   weight-based Ancef.  He was positioned in modified lithotomy.  The operative   area was Betadine prepped and draped in the usual sterile fashion.  Surgical   timeout was called.  All members of the operative team agree as the patient's   name, procedure to be performed without objections, attention was directed to   the procedure.     I began the procedure by passing a generously lubricated 22-English rigid   cystoscope per urethra.  The anterior urethra was normal.  The prostatic fossa   measured 4 cm in length.  He has bilobar occlusive hypertrophy.  Upon   entering the bladder, serial evaluation shows moderate trabeculation.  There   is a slight diverticulum on the right posterior wall.  Both ureteral orifices   were identified and have clear efflux and there were 2 large stones.    Utilizing a 952 micron holmium laser fiber at 100 watt laser at a frequency of   10 Hz and energy of 600-800 millijoules, I fragmented the stones into very   small pieces, which were removed with the [employer] evacuator.  At the completion   of the case, general inspection of the bladder showed some mucosal edema from   trauma of holding stones against it, but there is no perforation.  Both   ureteral orifices are effluxed.  All stones had been removed and the   diverticulum was inspected. Again, the stones also had been removed from this.    At this point in time, the bladder was drained, the scope was withdrawn.  He   tolerated the procedure well without complication.  His specimens will be   submitted for a chemical composition analysis, and I  would note that I used   three laser fibers due to the density of the stones and volume of stone   treated.  The patient was awakened in the operating room and transferred to   recovery room where he arrived in stable condition.        ______________________________  MD MATTHEW Elizabeth/CINTIA/KAYLAH    DD:  03/08/2023 20:44  DT:  03/08/2023 21:10    Job#:  [id number]

## 2023-03-09 NOTE — OR NURSING
1657 Report received from PACU Rn and patient arrived to phase 2.  AO x 4.    1705 Vital signs stable. Pain level 0/10, no complaints of n/v.  Up to restroom to void, steady gait, reports blood in urine, clear.     1720 Patient states ready to go home.  VSS, patient has all belongings. IV removed.  Patient dressed.  Discharge instructions discussed with patient.  Questions answered. Patient verbalized understanding.  Able to void again before leaving. Patient taken out with wheelchair and all belongings.

## 2023-03-09 NOTE — ANESTHESIA POSTPROCEDURE EVALUATION
Patient: Franklin Mejía    Procedure Summary     Date: 03/08/23 Room / Location: [facility] 12 / SURGERY [facility]    Anesthesia Start: 1441 Anesthesia Stop: 1615    Procedures:       CYSTOSCOPY AND LASER LITHOTRIPSY OF BLADDER STONES (Bladder)      LITHOTRIPSY, USING LASER (Bladder) Diagnosis: (URINARY BLADDER STONE)    Surgeons: Emil Addison M.D. Responsible Provider: Ronal Johnson M.D.    Anesthesia Type: general ASA Status: 2          Final Anesthesia Type: general  Last vitals  BP   Blood Pressure : (!) 140/69    Temp   36 °C (96.8 °F)    Pulse   71   Resp   16    SpO2   93 %      Anesthesia Post Evaluation    Patient location during evaluation: PACU  Patient participation: complete - patient participated  Level of consciousness: awake and alert  Pain score: 0    Airway patency: patent  Anesthetic complications: no  Cardiovascular status: hemodynamically stable  Respiratory status: acceptable  Hydration status: euvolemic    PONV: none          No notable events documented.     Nurse Pain Score: 0 (NPRS)

## 2023-03-09 NOTE — ANESTHESIA TIME REPORT
Anesthesia Start and Stop Event Times     Date Time Event    3/8/2023 1416 Ready for Procedure     1441 Anesthesia Start     1615 Anesthesia Stop        Responsible Staff  03/08/23    Name Role Begin End    Ronal Johnson M.D. Anesth 1441 1615        Overtime Reason:  no overtime (within assigned shift)    Comments: ANCA PRICE

## 2023-03-09 NOTE — OR NURSING
Patient AAOx4, calm, denies pain post op. VSS, afebrile, room air 92% breathing even and unlabored. Voided clear pink/red urine in urinal. Tolerating water, no nausea. Patients spouse updated on status and plan of care.

## 2023-04-18 ENCOUNTER — HOSPITAL ENCOUNTER (OUTPATIENT)
Dept: LAB | Facility: MEDICAL CENTER | Age: 78
End: 2023-04-18
Attending: PHYSICIAN ASSISTANT
Payer: MEDICARE

## 2023-04-18 PROCEDURE — 83970 ASSAY OF PARATHORMONE: CPT

## 2023-04-18 PROCEDURE — 82306 VITAMIN D 25 HYDROXY: CPT

## 2023-04-18 PROCEDURE — 84550 ASSAY OF BLOOD/URIC ACID: CPT

## 2023-04-18 PROCEDURE — 80053 COMPREHEN METABOLIC PANEL: CPT

## 2023-04-18 PROCEDURE — 36415 COLL VENOUS BLD VENIPUNCTURE: CPT

## 2023-04-19 LAB
25(OH)D3 SERPL-MCNC: 37 NG/ML (ref 30–100)
ALBUMIN SERPL BCP-MCNC: 4.3 G/DL (ref 3.2–4.9)
ALBUMIN/GLOB SERPL: 1.7 G/DL
ALP SERPL-CCNC: 37 U/L (ref 30–99)
ALT SERPL-CCNC: 19 U/L (ref 2–50)
ANION GAP SERPL CALC-SCNC: 11 MMOL/L (ref 7–16)
AST SERPL-CCNC: 19 U/L (ref 12–45)
BILIRUB SERPL-MCNC: 0.4 MG/DL (ref 0.1–1.5)
BUN SERPL-MCNC: 28 MG/DL (ref 8–22)
CALCIUM ALBUM COR SERPL-MCNC: 9.3 MG/DL (ref 8.5–10.5)
CALCIUM SERPL-MCNC: 9.5 MG/DL (ref 8.5–10.5)
CHLORIDE SERPL-SCNC: 105 MMOL/L (ref 96–112)
CO2 SERPL-SCNC: 24 MMOL/L (ref 20–33)
CREAT SERPL-MCNC: 1.59 MG/DL (ref 0.5–1.4)
GFR SERPLBLD CREATININE-BSD FMLA CKD-EPI: 45 ML/MIN/1.73 M 2
GLOBULIN SER CALC-MCNC: 2.5 G/DL (ref 1.9–3.5)
GLUCOSE SERPL-MCNC: 105 MG/DL (ref 65–99)
POTASSIUM SERPL-SCNC: 4.4 MMOL/L (ref 3.6–5.5)
PROT SERPL-MCNC: 6.8 G/DL (ref 6–8.2)
PTH-INTACT SERPL-MCNC: 17.7 PG/ML (ref 14–72)
SODIUM SERPL-SCNC: 140 MMOL/L (ref 135–145)
URATE SERPL-MCNC: 4.6 MG/DL (ref 2.5–8.3)

## 2023-04-22 ENCOUNTER — HOSPITAL ENCOUNTER (OUTPATIENT)
Facility: MEDICAL CENTER | Age: 78
End: 2023-04-22
Attending: PHYSICIAN ASSISTANT
Payer: MEDICARE

## 2023-04-22 PROCEDURE — 81050 URINALYSIS VOLUME MEASURE: CPT

## 2023-04-22 PROCEDURE — 84156 ASSAY OF PROTEIN URINE: CPT

## 2023-04-24 LAB
PROT 24H UR-MCNC: 130 MG/24 HR (ref 30–150)
PROT 24H UR-MRATE: 5 MG/DL (ref 0–15)
SPECIMEN VOL UR: 2600 ML

## 2023-05-01 ENCOUNTER — HOSPITAL ENCOUNTER (OUTPATIENT)
Facility: MEDICAL CENTER | Age: 78
End: 2023-05-01
Attending: PHYSICIAN ASSISTANT
Payer: MEDICARE

## 2023-05-01 LAB
PROT 24H UR-MCNC: 120 MG/24 HR (ref 30–150)
PROT 24H UR-MRATE: 4 MG/DL (ref 0–15)
SPECIMEN VOL UR: 3000 ML

## 2023-05-01 PROCEDURE — 84392 ASSAY OF URINE SULFATE: CPT

## 2023-05-01 PROCEDURE — 82570 ASSAY OF URINE CREATININE: CPT

## 2023-05-01 PROCEDURE — 84300 ASSAY OF URINE SODIUM: CPT

## 2023-05-01 PROCEDURE — 83986 ASSAY PH BODY FLUID NOS: CPT

## 2023-05-01 PROCEDURE — 81050 URINALYSIS VOLUME MEASURE: CPT

## 2023-05-01 PROCEDURE — 84560 ASSAY OF URINE/URIC ACID: CPT

## 2023-05-01 PROCEDURE — 84156 ASSAY OF PROTEIN URINE: CPT

## 2023-05-01 PROCEDURE — 83735 ASSAY OF MAGNESIUM: CPT

## 2023-05-01 PROCEDURE — 82131 AMINO ACIDS SINGLE QUANT: CPT

## 2023-05-01 PROCEDURE — 82507 ASSAY OF CITRATE: CPT

## 2023-05-01 PROCEDURE — 84105 ASSAY OF URINE PHOSPHORUS: CPT

## 2023-05-01 PROCEDURE — 82340 ASSAY OF CALCIUM IN URINE: CPT

## 2023-05-01 PROCEDURE — 83945 ASSAY OF OXALATE: CPT

## 2023-05-01 PROCEDURE — 84133 ASSAY OF URINE POTASSIUM: CPT

## 2023-05-01 PROCEDURE — 82436 ASSAY OF URINE CHLORIDE: CPT

## 2023-05-06 LAB
COLLECTION LENGTH 278029: 24 HRS
CREATININE URINE 40226: 66 MG/DL
CYSTINE 24H UR-MCNC: 0.57 MG/DL
CYSTINE 24H UR-MRATE: 17 MG/D
CYSTINE/CREAT 24H UR-RTO: 36 UMOL/G CRT
TOTAL VOLUME 1105: 3000 ML

## 2023-05-08 DIAGNOSIS — I10 ESSENTIAL HYPERTENSION: ICD-10-CM

## 2023-05-08 LAB
CA H2 PHOS DIHYD 24H UR-MRATE: 0.42
CALCIUM 24H UR-MCNC: 8.7 MG/DL
CALCIUM 24H UR-MRATE: 1.27 MG/(24.H)
CALCIUM 24H UR-MRATE: 261 MG/D (ref 100–250)
CAOX 24H UR-MRATE: 6.01
CHLORIDE 24H UR-SCNC: 44 MMOL/L
CHLORIDE 24H UR-SRATE: 132 MMOL/D (ref 140–250)
CITRATE 24H UR-MCNC: 183 MG/L
CITRATE 24H UR-MRATE: 549 MG/D (ref 320–1240)
COLLECT DURATION TIME SPEC: 24 HRS
CREAT 24H UR-MCNC: 66 MG/DL
CREAT 24H UR-MRATE: 1980 MG/D (ref 800–2100)
EER SUPERSAT PROFILE UR Q2098: ABNORMAL
MAGNESIUM 24H UR-MCNC: 2.4 MG/DL
MAGNESIUM 24H UR-MRATE: 72 MG/D (ref 12–199)
OXALATE 24H UR-MCNC: 15 MG/L
OXALATE 24H UR-MRATE: 45 MG/D (ref 16–49)
PH UR: 5.46 [PH] (ref 5–7.5)
PHOSPHATE 24H UR-MCNC: 36 MG/DL
PHOSPHATE 24H UR-MRATE: 1080 MG/D (ref 400–1300)
POTASSIUM 24H UR-SCNC: 19 MMOL/L
POTASSIUM 24H UR-SRATE: 57 MMOL/D (ref 25–125)
REF LAB TEST RESULTS: ABNORMAL
SODIUM 24H UR-SCNC: 51 MMOL/L
SODIUM 24H UR-SRATE: 153 MMOL/D (ref 51–286)
SULFATE 24H UR-SRATE: 18 MMOL/D (ref 6–30)
SULFATE UR-SCNC: 6 MMOL/L
TOTAL VOLUME 1105: 3000 ML
URATE 24H UR-MCNC: 31.6 MG/DL
URATE 24H UR-MRATE: 948 MG/D (ref 250–750)

## 2023-05-08 PROCEDURE — 1126F AMNT PAIN NOTED NONE PRSNT: CPT | Performed by: FAMILY MEDICINE

## 2023-05-08 NOTE — TELEPHONE ENCOUNTER
Received request via: Pharmacy    Was the patient seen in the last year in this department? Yes    Does the patient have an active prescription (recently filled or refills available) for medication(s) requested? No    Does the patient have long-term Plus and need 100 day supply (blood pressure, diabetes and cholesterol meds only)?     Yes, quantity updated to 100 days    Future Appointments         Provider Department Center    7/17/2023 11:00 AM (Arrive by 10:45 AM) Kendra Lee M.D. [facility]

## 2023-05-10 DIAGNOSIS — I10 ESSENTIAL HYPERTENSION: ICD-10-CM

## 2023-05-11 RX ORDER — AMLODIPINE BESYLATE 2.5 MG/1
2.5 TABLET ORAL DAILY
Qty: 90 TABLET | Refills: 0 | Status: SHIPPED
Start: 2023-05-11 | End: 2023-06-07

## 2023-05-11 RX ORDER — AMLODIPINE BESYLATE 2.5 MG/1
2.5 TABLET ORAL DAILY
Qty: 100 TABLET | Refills: 0 | Status: SHIPPED | OUTPATIENT
Start: 2023-05-11 | End: 2023-08-16

## 2023-06-01 RX ORDER — FENOFIBRATE 160 MG/1
160 TABLET ORAL DAILY
Qty: 90 TABLET | Refills: 0 | Status: SHIPPED | OUTPATIENT
Start: 2023-06-01 | End: 2023-08-30 | Stop reason: SDUPTHER

## 2023-06-01 NOTE — TELEPHONE ENCOUNTER
Received request via: Pharmacy    Was the patient seen in the last year in this department? Yes but needs to establish with new provider I left a VM    Does the patient have an active prescription (recently filled or refills available) for medication(s) requested? No    Does the patient have [facility] Plus and need 100 day supply (blood pressure, diabetes and cholesterol meds only)? Yes, quantity updated to 100 days

## 2023-06-07 ENCOUNTER — OFFICE VISIT (OUTPATIENT)
Dept: MEDICAL GROUP | Facility: MEDICAL CENTER | Age: 78
End: 2023-06-07
Payer: MEDICARE

## 2023-06-07 VITALS
HEIGHT: 71 IN | HEART RATE: 68 BPM | OXYGEN SATURATION: 94 % | SYSTOLIC BLOOD PRESSURE: 126 MMHG | TEMPERATURE: 97.7 F | BODY MASS INDEX: 27.35 KG/M2 | DIASTOLIC BLOOD PRESSURE: 68 MMHG | WEIGHT: 195.4 LBS

## 2023-06-07 DIAGNOSIS — N52.9 ERECTILE DYSFUNCTION, UNSPECIFIED ERECTILE DYSFUNCTION TYPE: ICD-10-CM

## 2023-06-07 DIAGNOSIS — I10 ESSENTIAL HYPERTENSION: ICD-10-CM

## 2023-06-07 DIAGNOSIS — N28.89 RENAL MASS: ICD-10-CM

## 2023-06-07 DIAGNOSIS — K21.9 GASTROESOPHAGEAL REFLUX DISEASE WITHOUT ESOPHAGITIS: ICD-10-CM

## 2023-06-07 DIAGNOSIS — I70.1 RENAL ARTERY STENOSIS (HCC): ICD-10-CM

## 2023-06-07 DIAGNOSIS — E78.5 DYSLIPIDEMIA: ICD-10-CM

## 2023-06-07 DIAGNOSIS — I70.0 ATHEROSCLEROSIS OF AORTA (HCC): ICD-10-CM

## 2023-06-07 DIAGNOSIS — N20.0 CALCULUS OF KIDNEY: ICD-10-CM

## 2023-06-07 DIAGNOSIS — N18.32 STAGE 3B CHRONIC KIDNEY DISEASE: ICD-10-CM

## 2023-06-07 PROBLEM — N52.01 ERECTILE DYSFUNCTION DUE TO ARTERIAL INSUFFICIENCY: Status: ACTIVE | Noted: 2022-03-01

## 2023-06-07 PROBLEM — N28.9 RENAL INSUFFICIENCY SYNDROME: Status: ACTIVE | Noted: 2022-03-01

## 2023-06-07 PROBLEM — N21.0 URINARY BLADDER STONE: Status: ACTIVE | Noted: 2022-10-26

## 2023-06-07 PROBLEM — H61.23 BILATERAL HEARING LOSS DUE TO CERUMEN IMPACTION: Status: RESOLVED | Noted: 2018-08-14 | Resolved: 2023-06-07

## 2023-06-07 PROBLEM — N48.6 INDURATION PENIS PLASTICA: Status: ACTIVE | Noted: 2021-03-01

## 2023-06-07 PROBLEM — Z87.442 HISTORY OF RENAL CALCULI: Status: ACTIVE | Noted: 2022-03-01

## 2023-06-07 PROCEDURE — 99214 OFFICE O/P EST MOD 30 MIN: CPT | Performed by: STUDENT IN AN ORGANIZED HEALTH CARE EDUCATION/TRAINING PROGRAM

## 2023-06-07 PROCEDURE — 3074F SYST BP LT 130 MM HG: CPT | Performed by: STUDENT IN AN ORGANIZED HEALTH CARE EDUCATION/TRAINING PROGRAM

## 2023-06-07 PROCEDURE — 3078F DIAST BP <80 MM HG: CPT | Performed by: STUDENT IN AN ORGANIZED HEALTH CARE EDUCATION/TRAINING PROGRAM

## 2023-06-07 ASSESSMENT — ENCOUNTER SYMPTOMS
WEIGHT LOSS: 0
DIZZINESS: 0
VOMITING: 0
PALPITATIONS: 0
SHORTNESS OF BREATH: 0
HEADACHES: 0
FEVER: 0
CHILLS: 0
NAUSEA: 0
WHEEZING: 0

## 2023-06-07 ASSESSMENT — FIBROSIS 4 INDEX: FIB4 SCORE: 1.2

## 2023-06-07 NOTE — PROGRESS NOTES
Subjective:     CC: N2U    HPI:   Franklin presents today with    Follows arie Addison    Cholesterol- well controlled   HTN- on amlodipine 2.5mg  and losartan 100mg   CKD- referral to nephrololgy by prior pcp    Following with urology for kidney stone and bladder stone    Creatinien elevated since 1/2022    Problem   Urinary Bladder Stone   Renal Mass    Noted on ultrasound doppler renal- right renal artery stenosis and Multiple left renal cyst measuring up to 5.1 x 3.5 x 4.4 cm. Internal echoes consistent with artifact. No definite septations or nodularity.     Renal Insufficiency Syndrome   History of Renal Calculi   Erectile Dysfunction Due to Arterial Insufficiency   Gastroesophageal Reflux Disease Without Esophagitis    On pantoprazole 20mg daily.   A/e with omeprazole (rash)  Followed with [facility].   Hx of GERD and hiatal hernia       Induration Penis Plastica   Erectile Dysfunction    Rare use of sildenafil     Atherosclerosis of Aorta (Hcc)    On lovastatin 10mg.      Renal artery stenosis (HCC), right    Incidental finding, well controlled BP  Cont losartan and amlodipine     Stage 3b Chronic Kidney Disease (Hcc)    This is a chronic condition. In setting of hypertension, renal artery stenosis (R) and history of kidney/bladder stones.   EGFR: 45   Stage: III  HCO3<22: 24   Phos:   Ca: 9.3  Vit D: 37  PTH: 17.7     Essential Hypertension    Losartan 100 mg daily and amlodipine 2.5 mg daily     Benign Prostatic Hyperplasia With Urinary Obstruction    Follows with Dr Addison, Urologist regularly. Take Flomax 0.4 mg daily.      Dyslipidemia    History of mild cholesterol elevation with history of triglyceride of in the 450s.  LDL 1 triglyceride well-controlled at lovastatin 10 mg daily and fenofibrate 160 mg daily.     Kidney calculus    History of nephrolithiasis and bladder stones currently following with Dr. Addison for work-up     Bilateral Hearing Loss Due to Cerumen Impaction (Resolved)       Health  "Maintenance:     ROS:  Review of Systems   Constitutional:  Negative for chills, fever and weight loss.   HENT:  Negative for hearing loss.    Respiratory:  Negative for shortness of breath and wheezing.    Cardiovascular:  Negative for chest pain and palpitations.   Gastrointestinal:  Negative for nausea and vomiting.   Skin:  Negative for rash.   Neurological:  Negative for dizziness and headaches.       Objective:     Exam:  /68 (BP Location: Left arm, Patient Position: Sitting, BP Cuff Size: Adult)   Pulse 68   Temp 36.5 °C (97.7 °F) (Temporal)   Ht 1.803 m (5' 11\")   Wt 88.6 kg (195 lb 6.4 oz)   SpO2 94%   BMI 27.25 kg/m²  Body mass index is 27.25 kg/m².    Physical Exam  Constitutional:       Appearance: Normal appearance.   Cardiovascular:      Rate and Rhythm: Normal rate and regular rhythm.   Pulmonary:      Effort: Pulmonary effort is normal.      Breath sounds: Normal breath sounds.   Musculoskeletal:      Cervical back: Normal range of motion and neck supple.   Lymphadenopathy:      Cervical: No cervical adenopathy.   Neurological:      Mental Status: He is alert.           Labs: see hpi    Assessment & Plan:     78 y.o. male with the following -     1. Gastroesophageal reflux disease without esophagitis  Chronic, stable  Reports history of GERD and hiatal hernia well-controlled on current dose of pantoprazole 20 mg daily without adverse effect    2. Dyslipidemia  Chronic, stable  History of triglyceridemia (450) and mildly elevated LDL currently on lovastatin 10 mg and fenofibrate 160 mg daily tolerating without adverse effect of muscle pain    3. Essential hypertension  Chronic, stable in setting of CKD, in the setting of renal arterial stenosis noted on ultrasound Doppler of kidneys.  Currently taking amlodipine 2.5 mg and losartan 100 mg daily which effect  Last UA without proteinuria    4. Kidney calculus  History of following with urology    5. Stage 3b chronic kidney disease " (HCC)  Chronic, stable  Suspected etiology possibly related to hypertension  Was referred to nephrologist by prior PCP  Vitamin D within normal limit calcium within normal limit PTH within normal limits  On losartan  Carb greater than 20  6. Atherosclerosis of aorta (HCC)  Chronic, stable, incidental finding  On lovastatin 10 mg    7. Erectile dysfunction, unspecified erectile dysfunction type  History of    8. Renal mass  Renal mass will need to follow-up suspect this is describing renal cyst noted on renal ultrasound will follow patient next visit    9. Renal artery stenosis (HCC), right  Continue medical management          Return in about 3 months (around 9/7/2023) for Lab review.    Please note that this dictation was created using voice recognition software. I have made every reasonable attempt to correct obvious errors, but I expect that there are errors of grammar and possibly content that I did not discover before finalizing the note.

## 2023-06-12 ENCOUNTER — APPOINTMENT (OUTPATIENT)
Dept: MEDICAL GROUP | Facility: MEDICAL CENTER | Age: 78
End: 2023-06-12
Payer: MEDICARE

## 2023-07-03 DIAGNOSIS — J30.89 CHRONIC NONSEASONAL ALLERGIC RHINITIS DUE TO POLLEN: ICD-10-CM

## 2023-07-03 RX ORDER — FLUTICASONE PROPIONATE 50 MCG
SPRAY, SUSPENSION (ML) NASAL
Qty: 48 G | Refills: 1 | Status: SHIPPED | OUTPATIENT
Start: 2023-07-03 | End: 2024-01-03 | Stop reason: SDUPTHER

## 2023-07-24 ENCOUNTER — APPOINTMENT (OUTPATIENT)
Dept: MEDICAL GROUP | Facility: MEDICAL CENTER | Age: 78
End: 2023-07-24
Payer: MEDICARE

## 2023-08-15 DIAGNOSIS — I10 ESSENTIAL HYPERTENSION: ICD-10-CM

## 2023-08-16 DIAGNOSIS — I10 ESSENTIAL HYPERTENSION: ICD-10-CM

## 2023-08-16 RX ORDER — AMLODIPINE BESYLATE 2.5 MG/1
2.5 TABLET ORAL DAILY
Qty: 100 TABLET | Refills: 0 | Status: SHIPPED | OUTPATIENT
Start: 2023-08-16 | End: 2023-11-14 | Stop reason: SDUPTHER

## 2023-08-16 RX ORDER — AMLODIPINE BESYLATE 2.5 MG/1
2.5 TABLET ORAL
Qty: 100 TABLET | Refills: 0 | Status: SHIPPED
Start: 2023-08-16 | End: 2023-09-13

## 2023-08-16 NOTE — TELEPHONE ENCOUNTER
Received request via: Pharmacy    Was the patient seen in the last year in this department? Yes    Does the patient have an active prescription (recently filled or refills available) for medication(s) requested? No    *  Future Appointments         Provider Department Center    9/13/2023 2:00 PM (Arrive by 1:45 PM) Damien Padilla M.D. [facility]  [address]

## 2023-08-30 ENCOUNTER — PATIENT MESSAGE (OUTPATIENT)
Dept: MEDICAL GROUP | Facility: MEDICAL CENTER | Age: 78
End: 2023-08-30
Payer: MEDICARE

## 2023-08-30 DIAGNOSIS — E78.5 DYSLIPIDEMIA: ICD-10-CM

## 2023-08-30 RX ORDER — FENOFIBRATE 160 MG/1
160 TABLET ORAL DAILY
Qty: 100 TABLET | Refills: 3 | Status: SHIPPED | OUTPATIENT
Start: 2023-08-30 | End: 2024-01-03 | Stop reason: SDUPTHER

## 2023-08-30 RX ORDER — LOVASTATIN 10 MG/1
10 TABLET ORAL EVERY EVENING
Qty: 100 TABLET | Refills: 3 | Status: SHIPPED | OUTPATIENT
Start: 2023-08-30 | End: 2024-01-03 | Stop reason: SDUPTHER

## 2023-08-30 NOTE — PATIENT COMMUNICATION
Received request via: Patient    Was the patient seen in the last year in this department? Yes    Does the patient have an active prescription (recently filled or refills available) for medication(s) requested? No    Does the patient have [facility] Plus and need 100 day supply (blood pressure, diabetes and cholesterol meds only)? Yes, quantity updated to 100 days

## 2023-09-13 ENCOUNTER — OFFICE VISIT (OUTPATIENT)
Dept: MEDICAL GROUP | Facility: MEDICAL CENTER | Age: 78
End: 2023-09-13
Payer: MEDICARE

## 2023-09-13 VITALS
HEIGHT: 71 IN | BODY MASS INDEX: 27.02 KG/M2 | OXYGEN SATURATION: 93 % | HEART RATE: 72 BPM | DIASTOLIC BLOOD PRESSURE: 62 MMHG | SYSTOLIC BLOOD PRESSURE: 110 MMHG | WEIGHT: 193 LBS | TEMPERATURE: 97.7 F

## 2023-09-13 DIAGNOSIS — I70.0 ATHEROSCLEROSIS OF AORTA (HCC): ICD-10-CM

## 2023-09-13 DIAGNOSIS — Z23 NEED FOR VACCINATION: ICD-10-CM

## 2023-09-13 DIAGNOSIS — E78.5 DYSLIPIDEMIA: ICD-10-CM

## 2023-09-13 DIAGNOSIS — I10 ESSENTIAL HYPERTENSION: ICD-10-CM

## 2023-09-13 DIAGNOSIS — K21.9 GASTROESOPHAGEAL REFLUX DISEASE WITHOUT ESOPHAGITIS: ICD-10-CM

## 2023-09-13 DIAGNOSIS — N18.32 STAGE 3B CHRONIC KIDNEY DISEASE: ICD-10-CM

## 2023-09-13 PROBLEM — R35.1 NOCTURIA: Status: ACTIVE | Noted: 2021-03-01

## 2023-09-13 PROBLEM — R35.1 NOCTURIA: Status: RESOLVED | Noted: 2021-03-01 | Resolved: 2023-09-13

## 2023-09-13 PROBLEM — Z87.442 HISTORY OF RENAL CALCULI: Status: RESOLVED | Noted: 2022-03-01 | Resolved: 2023-09-13

## 2023-09-13 PROBLEM — N52.01 ERECTILE DYSFUNCTION DUE TO ARTERIAL INSUFFICIENCY: Status: RESOLVED | Noted: 2022-03-01 | Resolved: 2023-09-13

## 2023-09-13 PROBLEM — N21.0 URINARY BLADDER STONE: Status: RESOLVED | Noted: 2022-10-26 | Resolved: 2023-09-13

## 2023-09-13 PROCEDURE — G0008 ADMIN INFLUENZA VIRUS VAC: HCPCS | Performed by: STUDENT IN AN ORGANIZED HEALTH CARE EDUCATION/TRAINING PROGRAM

## 2023-09-13 PROCEDURE — 3074F SYST BP LT 130 MM HG: CPT | Performed by: STUDENT IN AN ORGANIZED HEALTH CARE EDUCATION/TRAINING PROGRAM

## 2023-09-13 PROCEDURE — 90686 IIV4 VACC NO PRSV 0.5 ML IM: CPT | Performed by: STUDENT IN AN ORGANIZED HEALTH CARE EDUCATION/TRAINING PROGRAM

## 2023-09-13 PROCEDURE — 99214 OFFICE O/P EST MOD 30 MIN: CPT | Mod: 25 | Performed by: STUDENT IN AN ORGANIZED HEALTH CARE EDUCATION/TRAINING PROGRAM

## 2023-09-13 PROCEDURE — 3078F DIAST BP <80 MM HG: CPT | Performed by: STUDENT IN AN ORGANIZED HEALTH CARE EDUCATION/TRAINING PROGRAM

## 2023-09-13 ASSESSMENT — ENCOUNTER SYMPTOMS
DIZZINESS: 0
HEADACHES: 0
WEIGHT LOSS: 0
WHEEZING: 0
SHORTNESS OF BREATH: 0
NAUSEA: 0
CHILLS: 0
PALPITATIONS: 0
FEVER: 0
VOMITING: 0

## 2023-09-13 ASSESSMENT — FIBROSIS 4 INDEX: FIB4 SCORE: 1.2

## 2023-09-13 NOTE — PROGRESS NOTES
"Subjective:     CC:     HPI:   Franklin presents today with    Cholesterol- well controlled   HTN- on amlodipine 2.5mg  and losartan 100mg   CKD- in setting of hx renal art. Stenosis, HTN    Following with urology for kidney stone and bladder stone        Health Maintenance:     ROS:  Review of Systems   Constitutional:  Negative for chills, fever and weight loss.   HENT:  Negative for hearing loss.    Respiratory:  Negative for shortness of breath and wheezing.    Cardiovascular:  Negative for chest pain and palpitations.   Gastrointestinal:  Negative for nausea and vomiting.   Genitourinary:  Negative for frequency and urgency.   Skin:  Negative for rash.   Neurological:  Negative for dizziness and headaches.       Objective:     Exam:  /62 (BP Location: Left arm, Patient Position: Sitting)   Pulse 72   Temp 36.5 °C (97.7 °F) (Temporal)   Ht 1.803 m (5' 11\")   Wt 87.5 kg (193 lb)   SpO2 93%   BMI 26.92 kg/m²  Body mass index is 26.92 kg/m².    Physical Exam  Constitutional:       Appearance: Normal appearance.   Cardiovascular:      Rate and Rhythm: Normal rate and regular rhythm.   Pulmonary:      Effort: Pulmonary effort is normal.      Breath sounds: Normal breath sounds.   Neurological:      Mental Status: He is alert.           Labs:     Assessment & Plan:     78 y.o. male with the following -     1. Gastroesophageal reflux disease without esophagitis  Chronic, stable  Follows with [facility]  Report no longer need repeat endoscopy   On pantoprazole 220mg daily    2. Stage 3b chronic kidney disease (HCC)  Chronic, stable  Etiology likely HTN / hx nephrolithiasis/ hx of renal art stenosis   Plan  Monitor CMP/ bicarb  - Comp Metabolic Panel; Future  - VITAMIN D,25 HYDROXY (DEFICIENCY); Future    3. Need for vaccination    - INFLUENZA VACCINE QUAD INJ (PF)    4. Dyslipidemia  Chronic, stable  Hx of trilyceridemia 450s on fenofibrate and lovastatin 10mg daily with good control  c    5. Essential " hypertension  [place], stable  On amlodipine 2.5mg and losartan 100mg with good control     6. Atherosclerosis of aorta (HCC)  Chronic stable  On lovastatin 10mg daily         Return in about 6 months (around 3/13/2024) for Annual wellness visit.    Please note that this dictation was created using voice recognition software. I have made every reasonable attempt to correct obvious errors, but I expect that there are errors of grammar and possibly content that I did not discover before finalizing the note.

## 2023-11-14 DIAGNOSIS — I10 ESSENTIAL HYPERTENSION: ICD-10-CM

## 2023-11-14 RX ORDER — AMLODIPINE BESYLATE 2.5 MG/1
2.5 TABLET ORAL DAILY
Qty: 100 TABLET | Refills: 2 | Status: SHIPPED | OUTPATIENT
Start: 2023-11-14 | End: 2024-01-03 | Stop reason: SDUPTHER

## 2023-11-19 ENCOUNTER — OFFICE VISIT (OUTPATIENT)
Dept: URGENT CARE | Facility: CLINIC | Age: 78
End: 2023-11-19
Payer: MEDICARE

## 2023-11-19 VITALS
TEMPERATURE: 97.7 F | DIASTOLIC BLOOD PRESSURE: 70 MMHG | WEIGHT: 190 LBS | HEART RATE: 82 BPM | BODY MASS INDEX: 26.6 KG/M2 | SYSTOLIC BLOOD PRESSURE: 142 MMHG | HEIGHT: 71 IN | OXYGEN SATURATION: 95 % | RESPIRATION RATE: 18 BRPM

## 2023-11-19 DIAGNOSIS — B96.89 ACUTE BACTERIAL SINUSITIS: ICD-10-CM

## 2023-11-19 DIAGNOSIS — J01.90 ACUTE BACTERIAL SINUSITIS: ICD-10-CM

## 2023-11-19 PROCEDURE — 99213 OFFICE O/P EST LOW 20 MIN: CPT | Performed by: NURSE PRACTITIONER

## 2023-11-19 PROCEDURE — 3077F SYST BP >= 140 MM HG: CPT | Performed by: NURSE PRACTITIONER

## 2023-11-19 PROCEDURE — 3078F DIAST BP <80 MM HG: CPT | Performed by: NURSE PRACTITIONER

## 2023-11-19 RX ORDER — AMOXICILLIN AND CLAVULANATE POTASSIUM 875; 125 MG/1; MG/1
1 TABLET, FILM COATED ORAL 2 TIMES DAILY
Qty: 14 TABLET | Refills: 0 | Status: SHIPPED | OUTPATIENT
Start: 2023-11-19 | End: 2023-11-26

## 2023-11-19 ASSESSMENT — FIBROSIS 4 INDEX: FIB4 SCORE: 1.2

## 2023-11-19 ASSESSMENT — ENCOUNTER SYMPTOMS
SORE THROAT: 0
NAUSEA: 0
HEADACHES: 0
ORTHOPNEA: 0
EYE DISCHARGE: 0
MYALGIAS: 0
FEVER: 0
COUGH: 0
DIARRHEA: 0
CHILLS: 0

## 2023-11-19 NOTE — PROGRESS NOTES
Subjective     Honorio Mejía is a 78 y.o. male who presents with Medication Refill            HPI  New problem.  Patient is a very pleasant 78-year-old male who presents with a 10-day history of nasal congestion, sinus pain and pressure.  He reports this started as a cold approximately 10 days ago and most of the symptoms have resolved except for the persistent nasal congestion and discharge.  He denies fever, chills, cough, sore throat, nausea, or diarrhea.  He is up-to-date on flu vaccine as well as COVID vaccination.  He takes daily Flonase for the symptoms.    Flu virus vaccine and Prilosec [omeprazole]  Current Outpatient Medications on File Prior to Visit   Medication Sig Dispense Refill    amLODIPine (NORVASC) 2.5 MG Tab Take 1 Tablet by mouth every day. 100 Tablet 2    fenofibrate (TRIGLIDE) 160 MG tablet Take 1 Tablet by mouth every day. 100 Tablet 3    lovastatin (MEVACOR) 10 MG tablet Take 1 Tablet by mouth every evening. 100 Tablet 3    fluticasone (FLONASE) 50 MCG/ACT nasal spray USE 2 SPRAYS IN EACH NOSTRIL ONCE DAILY. 48 g 1    losartan (COZAAR) 100 MG Tab Take 1 Tablet by mouth every day. 100 Tablet 3    sildenafil (REVATIO) 20 MG tablet sildenafil (pulmonary hypertension) 20 mg tablet   Take 2 tablets as needed by oral route as needed for 30 days.      pantoprazole (PROTONIX) 20 MG tablet       tamsulosin (FLOMAX) 0.4 MG capsule Take 1 Cap by mouth ONE-HALF HOUR AFTER BREAKFAST. 100 Cap 3    coenzyme Q-10 30 MG capsule Take 60 mg by mouth every day.      Multiple Vitamin (MULTI VITAMIN PO) Take 1 Tab by mouth every day.      Cyanocobalamin (VITAMIN B-12) 5000 MCG SL Tab Place 1 Tab under tongue every day.      Cholecalciferol (VITAMIN D3) 5000 UNITS Cap Take 1 Cap by mouth every day.       No current facility-administered medications on file prior to visit.     Social History     Socioeconomic History    Marital status:      Spouse name: Not on file    Number of children: Not on file     Years of education: Not on file    Highest education level: Bachelor's degree (e.g., BA, AB, BS)   Occupational History    Not on file   Tobacco Use    Smoking status: Former     Current packs/day: 0.00     Average packs/day: 1 pack/day for 15.0 years (15.0 ttl pk-yrs)     Types: Cigarettes     Start date: 1972     Quit date: 1987     Years since quittin.9    Smokeless tobacco: Never   Vaping Use    Vaping Use: Never used   Substance and Sexual Activity    Alcohol use: Yes     Alcohol/week: 0.6 oz     Types: 1 Standard drinks or equivalent per week     Comment: 3-4 per week    Drug use: No    Sexual activity: Yes     Partners: Female   Other Topics Concern    Not on file   Social History Narrative    Not on file     Social Determinants of Health     Financial Resource Strain: Low Risk  (3/4/2023)    Overall Financial Resource Strain (CARDIA)     Difficulty of Paying Living Expenses: Not hard at all   Food Insecurity: No Food Insecurity (3/4/2023)    Hunger Vital Sign     Worried About Running Out of Food in the Last Year: Never true     Ran Out of Food in the Last Year: Never true   Transportation Needs: No Transportation Needs (3/4/2023)    PRAPARE - Transportation     Lack of Transportation (Medical): No     Lack of Transportation (Non-Medical): No   Physical Activity: Sufficiently Active (3/4/2023)    Exercise Vital Sign     Days of Exercise per Week: 4 days     Minutes of Exercise per Session: 90 min   Stress: No Stress Concern Present (3/4/2023)    [employer] - Occupational Stress Questionnaire     Feeling of Stress : Only a little   Social Connections: Socially Integrated (3/4/2023)    Social Connection and Isolation Panel [NHANES]     Frequency of Communication with Friends and Family: More than three times a week     Frequency of Social Gatherings with Friends and Family: Once a week     Attends [employer] Services: More than 4 times per year     Active Member of  "Clubs or Organizations: Yes     Attends Club or Organization Meetings: More than 4 times per year     Marital Status:    Intimate Partner Violence: Not on file   Housing Stability: Low Risk  (3/4/2023)    Housing Stability Vital Sign     Unable to Pay for Housing in the Last Year: No     Number of Places Lived in the Last Year: 1     Unstable Housing in the Last Year: No     Breast Cancer-related family history is not on file.      Review of Systems   Constitutional:  Positive for malaise/fatigue. Negative for chills and fever.   HENT:  Positive for congestion. Negative for sore throat.    Eyes:  Negative for discharge.   Respiratory:  Negative for cough.    Cardiovascular:  Negative for chest pain and orthopnea.   Gastrointestinal:  Negative for diarrhea and nausea.   Musculoskeletal:  Negative for myalgias.   Neurological:  Negative for headaches.   Endo/Heme/Allergies:  Negative for environmental allergies.   All other systems reviewed and are negative.             Objective     BP (!) 142/70 (BP Location: Left arm, Patient Position: Sitting, BP Cuff Size: Adult)   Pulse 82   Temp 36.5 °C (97.7 °F) (Temporal)   Resp 18   Ht 1.803 m (5' 11\")   Wt 86.2 kg (190 lb)   SpO2 95%   BMI 26.50 kg/m²      Physical Exam  Vitals and nursing note reviewed.   Constitutional:       General: He is not in acute distress.     Appearance: He is well-developed.   HENT:      Head: Normocephalic and atraumatic.      Right Ear: Tympanic membrane, ear canal and external ear normal. No middle ear effusion. Tympanic membrane is not injected or perforated.      Left Ear: Tympanic membrane, ear canal and external ear normal.  No middle ear effusion. Tympanic membrane is not injected or perforated.      Nose: Mucosal edema, congestion and rhinorrhea present.      Mouth/Throat:      Pharynx: Posterior oropharyngeal erythema present. No oropharyngeal exudate.   Eyes:      General:         Right eye: No discharge.         Left " eye: No discharge.      Conjunctiva/sclera: Conjunctivae normal.   Cardiovascular:      Rate and Rhythm: Normal rate and regular rhythm.      Heart sounds: Normal heart sounds. No murmur heard.  Pulmonary:      Effort: Pulmonary effort is normal. No respiratory distress.      Breath sounds: Normal breath sounds.   Musculoskeletal:         General: Normal range of motion.      Cervical back: Normal range of motion and neck supple.      Comments: Normal movement of all 4 extremities.   Lymphadenopathy:      Cervical: No cervical adenopathy.      Upper Body:      Right upper body: No supraclavicular adenopathy.      Left upper body: No supraclavicular adenopathy.   Skin:     General: Skin is warm and dry.   Neurological:      Mental Status: He is alert and oriented to person, place, and time.      Gait: Gait normal.   Psychiatric:         Behavior: Behavior normal.         Thought Content: Thought content normal.                             Assessment & Plan        1. Acute bacterial sinusitis  amoxicillin-clavulanate (AUGMENTIN) 875-125 MG Tab        Patient placed on a 7-day course of Augmentin.  He is advised to continue Flonase as directed.  Follow-up 7 to 10 days if symptoms or not improving.

## 2024-01-03 ENCOUNTER — TELEPHONE (OUTPATIENT)
Dept: NEPHROLOGY | Facility: MEDICAL CENTER | Age: 79
End: 2024-01-03
Payer: MEDICARE

## 2024-01-03 DIAGNOSIS — N18.32 STAGE 3B CHRONIC KIDNEY DISEASE: ICD-10-CM

## 2024-01-03 DIAGNOSIS — N20.0 CALCULUS OF KIDNEY: ICD-10-CM

## 2024-01-03 DIAGNOSIS — I70.1 RENAL ARTERY STENOSIS (HCC): ICD-10-CM

## 2024-01-03 DIAGNOSIS — I10 ESSENTIAL HYPERTENSION: ICD-10-CM

## 2024-01-03 DIAGNOSIS — E78.5 DYSLIPIDEMIA: ICD-10-CM

## 2024-01-03 DIAGNOSIS — I15.0 RENOVASCULAR HYPERTENSION: ICD-10-CM

## 2024-01-03 DIAGNOSIS — R80.9 MICROALBUMINURIA: ICD-10-CM

## 2024-01-03 DIAGNOSIS — E55.9 VITAMIN D DEFICIENCY: ICD-10-CM

## 2024-01-03 DIAGNOSIS — J30.89 CHRONIC NONSEASONAL ALLERGIC RHINITIS DUE TO POLLEN: ICD-10-CM

## 2024-01-03 RX ORDER — LOVASTATIN 10 MG/1
10 TABLET ORAL EVERY EVENING
Qty: 100 TABLET | Refills: 3 | Status: SHIPPED | OUTPATIENT
Start: 2024-01-03 | End: 2024-01-19 | Stop reason: SDUPTHER

## 2024-01-03 RX ORDER — LOSARTAN POTASSIUM 100 MG/1
100 TABLET ORAL
Qty: 100 TABLET | Refills: 3 | Status: SHIPPED | OUTPATIENT
Start: 2024-01-03 | End: 2024-01-19 | Stop reason: SDUPTHER

## 2024-01-03 RX ORDER — AMLODIPINE BESYLATE 2.5 MG/1
2.5 TABLET ORAL DAILY
Qty: 100 TABLET | Refills: 2 | Status: SHIPPED | OUTPATIENT
Start: 2024-01-03 | End: 2024-01-19 | Stop reason: SDUPTHER

## 2024-01-03 RX ORDER — TAMSULOSIN HYDROCHLORIDE 0.4 MG/1
0.4 CAPSULE ORAL
Qty: 100 CAPSULE | Refills: 3 | Status: SHIPPED | OUTPATIENT
Start: 2024-01-03

## 2024-01-03 RX ORDER — FLUTICASONE PROPIONATE 50 MCG
SPRAY, SUSPENSION (ML) NASAL
Qty: 48 G | Refills: 1 | Status: SHIPPED | OUTPATIENT
Start: 2024-01-03 | End: 2024-01-19 | Stop reason: SDUPTHER

## 2024-01-03 RX ORDER — FENOFIBRATE 160 MG/1
160 TABLET ORAL DAILY
Qty: 100 TABLET | Refills: 3 | Status: SHIPPED | OUTPATIENT
Start: 2024-01-03 | End: 2024-01-19 | Stop reason: SDUPTHER

## 2024-01-19 DIAGNOSIS — I10 ESSENTIAL HYPERTENSION: ICD-10-CM

## 2024-01-19 DIAGNOSIS — J30.89 CHRONIC NONSEASONAL ALLERGIC RHINITIS DUE TO POLLEN: ICD-10-CM

## 2024-01-19 DIAGNOSIS — E78.5 DYSLIPIDEMIA: ICD-10-CM

## 2024-01-19 RX ORDER — FENOFIBRATE 160 MG/1
160 TABLET ORAL DAILY
Qty: 100 TABLET | Refills: 3 | Status: SHIPPED | OUTPATIENT
Start: 2024-01-19

## 2024-01-19 RX ORDER — LOVASTATIN 10 MG/1
10 TABLET ORAL EVERY EVENING
Qty: 100 TABLET | Refills: 3 | Status: SHIPPED | OUTPATIENT
Start: 2024-01-19

## 2024-01-19 RX ORDER — LOSARTAN POTASSIUM 100 MG/1
100 TABLET ORAL
Qty: 100 TABLET | Refills: 3 | Status: SHIPPED | OUTPATIENT
Start: 2024-01-19

## 2024-01-19 RX ORDER — AMLODIPINE BESYLATE 2.5 MG/1
2.5 TABLET ORAL DAILY
Qty: 100 TABLET | Refills: 2 | Status: SHIPPED | OUTPATIENT
Start: 2024-01-19

## 2024-01-19 RX ORDER — FLUTICASONE PROPIONATE 50 MCG
SPRAY, SUSPENSION (ML) NASAL
Qty: 48 G | Refills: 1 | Status: SHIPPED | OUTPATIENT
Start: 2024-01-19

## 2024-01-19 NOTE — TELEPHONE ENCOUNTER
Received request via: Pharmacy    Was the patient seen in the last year in this department? Yes    Does the patient have an active prescription (recently filled or refills available) for medication(s) requested? No    Pharmacy Name: [id number] - [place], [place] - [id number] [[id number]]     Does the patient have longterm Plus and need 100 day supply (blood pressure, diabetes and cholesterol meds only)? Yes, quantity updated to 100 days and Medication is not for cholesterol, blood pressure or diabetes

## 2024-01-31 ENCOUNTER — HOSPITAL ENCOUNTER (OUTPATIENT)
Dept: LAB | Facility: MEDICAL CENTER | Age: 79
End: 2024-01-31
Attending: INTERNAL MEDICINE
Payer: MEDICARE

## 2024-01-31 ENCOUNTER — APPOINTMENT (OUTPATIENT)
Dept: NEPHROLOGY | Facility: MEDICAL CENTER | Age: 79
End: 2024-01-31
Payer: MEDICARE

## 2024-01-31 ENCOUNTER — HOSPITAL ENCOUNTER (OUTPATIENT)
Dept: LAB | Facility: MEDICAL CENTER | Age: 79
End: 2024-01-31
Attending: PHYSICIAN ASSISTANT
Payer: MEDICARE

## 2024-01-31 DIAGNOSIS — N18.32 STAGE 3B CHRONIC KIDNEY DISEASE: ICD-10-CM

## 2024-01-31 DIAGNOSIS — N20.0 CALCULUS OF KIDNEY: ICD-10-CM

## 2024-01-31 DIAGNOSIS — R80.9 MICROALBUMINURIA: ICD-10-CM

## 2024-01-31 LAB
25(OH)D3 SERPL-MCNC: 39 NG/ML (ref 30–100)
25(OH)D3 SERPL-MCNC: 40 NG/ML (ref 30–100)
ALBUMIN SERPL BCP-MCNC: 4.4 G/DL (ref 3.2–4.9)
ALBUMIN/GLOB SERPL: 1.8 G/DL
ALP SERPL-CCNC: 32 U/L (ref 30–99)
ALT SERPL-CCNC: 19 U/L (ref 2–50)
ANION GAP SERPL CALC-SCNC: 10 MMOL/L (ref 7–16)
ANION GAP SERPL CALC-SCNC: 11 MMOL/L (ref 7–16)
APPEARANCE UR: CLEAR
AST SERPL-CCNC: 19 U/L (ref 12–45)
BACTERIA #/AREA URNS HPF: NEGATIVE /HPF
BILIRUB SERPL-MCNC: 0.6 MG/DL (ref 0.1–1.5)
BILIRUB UR QL STRIP.AUTO: NEGATIVE
BUN SERPL-MCNC: 25 MG/DL (ref 8–22)
BUN SERPL-MCNC: 25 MG/DL (ref 8–22)
CALCIUM ALBUM COR SERPL-MCNC: 8.7 MG/DL (ref 8.5–10.5)
CALCIUM SERPL-MCNC: 9 MG/DL (ref 8.5–10.5)
CALCIUM SERPL-MCNC: 9 MG/DL (ref 8.5–10.5)
CAOX CRY #/AREA URNS HPF: ABNORMAL /HPF
CHLORIDE SERPL-SCNC: 107 MMOL/L (ref 96–112)
CHLORIDE SERPL-SCNC: 107 MMOL/L (ref 96–112)
CO2 SERPL-SCNC: 22 MMOL/L (ref 20–33)
CO2 SERPL-SCNC: 23 MMOL/L (ref 20–33)
COLOR UR: YELLOW
CREAT SERPL-MCNC: 1.5 MG/DL (ref 0.5–1.4)
CREAT SERPL-MCNC: 1.53 MG/DL (ref 0.5–1.4)
CREAT UR-MCNC: 134.44 MG/DL
EPI CELLS #/AREA URNS HPF: ABNORMAL /HPF
ERYTHROCYTE [DISTWIDTH] IN BLOOD BY AUTOMATED COUNT: 44.7 FL (ref 35.9–50)
GFR SERPLBLD CREATININE-BSD FMLA CKD-EPI: 47 ML/MIN/1.73 M 2
GFR SERPLBLD CREATININE-BSD FMLA CKD-EPI: 48 ML/MIN/1.73 M 2
GLOBULIN SER CALC-MCNC: 2.5 G/DL (ref 1.9–3.5)
GLUCOSE SERPL-MCNC: 96 MG/DL (ref 65–99)
GLUCOSE SERPL-MCNC: 97 MG/DL (ref 65–99)
GLUCOSE UR STRIP.AUTO-MCNC: NEGATIVE MG/DL
HCT VFR BLD AUTO: 46.7 % (ref 42–52)
HGB BLD-MCNC: 15.9 G/DL (ref 14–18)
HYALINE CASTS #/AREA URNS LPF: ABNORMAL /LPF
KETONES UR STRIP.AUTO-MCNC: NEGATIVE MG/DL
LEUKOCYTE ESTERASE UR QL STRIP.AUTO: ABNORMAL
MCH RBC QN AUTO: 32.5 PG (ref 27–33)
MCHC RBC AUTO-ENTMCNC: 34 G/DL (ref 32.3–36.5)
MCV RBC AUTO: 95.5 FL (ref 81.4–97.8)
MICRO URNS: ABNORMAL
MICROALBUMIN UR-MCNC: <1.2 MG/DL
MICROALBUMIN/CREAT UR: NORMAL MG/G (ref 0–30)
NITRITE UR QL STRIP.AUTO: NEGATIVE
PH UR STRIP.AUTO: 5.5 [PH] (ref 5–8)
PLATELET # BLD AUTO: 279 K/UL (ref 164–446)
PMV BLD AUTO: 10.5 FL (ref 9–12.9)
POTASSIUM SERPL-SCNC: 4.4 MMOL/L (ref 3.6–5.5)
POTASSIUM SERPL-SCNC: 4.4 MMOL/L (ref 3.6–5.5)
PROT SERPL-MCNC: 6.9 G/DL (ref 6–8.2)
PROT UR QL STRIP: NEGATIVE MG/DL
RBC # BLD AUTO: 4.89 M/UL (ref 4.7–6.1)
RBC # URNS HPF: ABNORMAL /HPF
RBC UR QL AUTO: NEGATIVE
SODIUM SERPL-SCNC: 140 MMOL/L (ref 135–145)
SODIUM SERPL-SCNC: 140 MMOL/L (ref 135–145)
SP GR UR STRIP.AUTO: 1.02
UROBILINOGEN UR STRIP.AUTO-MCNC: 0.2 MG/DL
WBC # BLD AUTO: 5.3 K/UL (ref 4.8–10.8)
WBC #/AREA URNS HPF: ABNORMAL /HPF

## 2024-01-31 PROCEDURE — 81001 URINALYSIS AUTO W/SCOPE: CPT

## 2024-01-31 PROCEDURE — 36415 COLL VENOUS BLD VENIPUNCTURE: CPT

## 2024-01-31 PROCEDURE — 80053 COMPREHEN METABOLIC PANEL: CPT

## 2024-01-31 PROCEDURE — 82306 VITAMIN D 25 HYDROXY: CPT | Mod: 91

## 2024-01-31 PROCEDURE — 80048 BASIC METABOLIC PNL TOTAL CA: CPT

## 2024-01-31 PROCEDURE — 82043 UR ALBUMIN QUANTITATIVE: CPT

## 2024-01-31 PROCEDURE — 82570 ASSAY OF URINE CREATININE: CPT

## 2024-01-31 PROCEDURE — 82306 VITAMIN D 25 HYDROXY: CPT

## 2024-01-31 PROCEDURE — 85027 COMPLETE CBC AUTOMATED: CPT

## 2024-02-07 ENCOUNTER — OFFICE VISIT (OUTPATIENT)
Dept: NEPHROLOGY | Facility: MEDICAL CENTER | Age: 79
End: 2024-02-07
Payer: MEDICARE

## 2024-02-07 VITALS
WEIGHT: 199 LBS | RESPIRATION RATE: 16 BRPM | DIASTOLIC BLOOD PRESSURE: 68 MMHG | HEIGHT: 71 IN | OXYGEN SATURATION: 94 % | HEART RATE: 61 BPM | SYSTOLIC BLOOD PRESSURE: 120 MMHG | BODY MASS INDEX: 27.86 KG/M2 | TEMPERATURE: 97.8 F

## 2024-02-07 DIAGNOSIS — N18.31 STAGE 3A CHRONIC KIDNEY DISEASE: ICD-10-CM

## 2024-02-07 DIAGNOSIS — E55.9 VITAMIN D DEFICIENCY: ICD-10-CM

## 2024-02-07 DIAGNOSIS — D64.9 ANEMIA, UNSPECIFIED TYPE: ICD-10-CM

## 2024-02-07 DIAGNOSIS — I70.1 RENAL ARTERY STENOSIS (HCC): ICD-10-CM

## 2024-02-07 DIAGNOSIS — R80.9 MICROALBUMINURIA: ICD-10-CM

## 2024-02-07 DIAGNOSIS — I15.0 RENOVASCULAR HYPERTENSION: ICD-10-CM

## 2024-02-07 PROCEDURE — 99204 OFFICE O/P NEW MOD 45 MIN: CPT | Performed by: INTERNAL MEDICINE

## 2024-02-07 PROCEDURE — 3074F SYST BP LT 130 MM HG: CPT | Performed by: INTERNAL MEDICINE

## 2024-02-07 PROCEDURE — 3078F DIAST BP <80 MM HG: CPT | Performed by: INTERNAL MEDICINE

## 2024-02-07 ASSESSMENT — ENCOUNTER SYMPTOMS
CHILLS: 0
SINUS PAIN: 0
WEIGHT LOSS: 0
SHORTNESS OF BREATH: 0
NAUSEA: 0
FLANK PAIN: 0
ORTHOPNEA: 0
HEMOPTYSIS: 0
COUGH: 0
EYES NEGATIVE: 1
VOMITING: 0
WHEEZING: 0
PALPITATIONS: 0
DIARRHEA: 0
ABDOMINAL PAIN: 0
FEVER: 0

## 2024-02-07 ASSESSMENT — FIBROSIS 4 INDEX: FIB4 SCORE: 1.2

## 2024-02-07 NOTE — PATIENT INSTRUCTIONS
Continue current treatment  Low salt diet  Keep well hydrated  Monitor BP and call clinic if BP > 135/85  Avoid NSAID's

## 2024-02-08 NOTE — PROGRESS NOTES
Subjective     Honorio Mejía is a 79 y.o. male who presents with New Patient and Chronic Kidney Disease            HPI  Honorio is coming today for initial evaluation of CKD III  Seen last time here in Oct 2019  States doing well  No complaints  Good appetite and energy level  CKD IIIa - baseline creat level at 1.5-1.6 -stable  HTN: BP very well controlled  WANDA -not significant  Hx/of nephrolithiasis -stable -f/u with Urology    Review of Systems   Constitutional:  Negative for chills, fever, malaise/fatigue and weight loss.   HENT:  Negative for congestion, hearing loss and sinus pain.    Eyes: Negative.    Respiratory:  Negative for cough, hemoptysis, shortness of breath and wheezing.    Cardiovascular:  Negative for chest pain, palpitations, orthopnea and leg swelling.   Gastrointestinal:  Negative for abdominal pain, diarrhea, nausea and vomiting.   Genitourinary:  Negative for dysuria, flank pain, frequency, hematuria and urgency.   Skin: Negative.    All other systems reviewed and are negative.         Past Medical History:   Diagnosis Date    Heart burn 2010    Hiatus hernia syndrome 2010    High cholesterol 2007    Hyperlipidemia     Hypertension     pt states well controlled on meds    Indigestion 2010    Kidney stone     stones    Other male erectile dysfunction 2021    Urinary bladder disorder 2011    Bladder stone.       Family History   Problem Relation Age of Onset    Cancer Mother         . Complications of colon cancer    Heart Disease Brother     No Known Problems Maternal Grandmother     Hypertension Maternal Grandfather        Social History     Socioeconomic History    Marital status:     Highest education level: Bachelor's degree (e.g., BA, AB, BS)   Tobacco Use    Smoking status: Former     Current packs/day: 0.00     Average packs/day: 1 pack/day for 15.0 years (15.0 ttl pk-yrs)     Types: Cigarettes     Start date: 1972     Quit date: 1987     Years since  quittin.1    Smokeless tobacco: Never   Vaping Use    Vaping Use: Never used   Substance and Sexual Activity    Alcohol use: Yes     Alcohol/week: 0.6 oz     Types: 1 Standard drinks or equivalent per week     Comment: 3-4 per week    Drug use: No    Sexual activity: Yes     Partners: Female     Social Determinants of Health     Financial Resource Strain: Low Risk  (3/4/2023)    Overall Financial Resource Strain (CARDIA)     Difficulty of Paying Living Expenses: Not hard at all   Food Insecurity: No Food Insecurity (3/4/2023)    Hunger Vital Sign     Worried About Running Out of Food in the Last Year: Never true     Ran Out of Food in the Last Year: Never true   Transportation Needs: No Transportation Needs (3/4/2023)    PRAPARE - Transportation     Lack of Transportation (Medical): No     Lack of Transportation (Non-Medical): No   Physical Activity: Sufficiently Active (3/4/2023)    Exercise Vital Sign     Days of Exercise per Week: 4 days     Minutes of Exercise per Session: 90 min   Stress: No Stress Concern Present (3/4/2023)    [employer] [place] [employer] - Occupational Stress Questionnaire     Feeling of Stress : Only a little   Social Connections: Socially Integrated (3/4/2023)    Social Connection and Isolation Panel [NHANES]     Frequency of Communication with Friends and Family: More than three times a week     Frequency of Social Gatherings with Friends and Family: Once a week     Attends [employer] Services: More than 4 times per year     Active Member of Clubs or Organizations: Yes     Attends Club or Organization Meetings: More than 4 times per year     Marital Status:    Housing Stability: Low Risk  (3/4/2023)    Housing Stability Vital Sign     Unable to Pay for Housing in the Last Year: No     Number of Places Lived in the Last Year: 1     Unstable Housing in the Last Year: No         Objective     /68 (BP Location: Right arm, Patient Position: Sitting, BP Cuff Size:  "Adult)   Pulse 61   Temp 36.6 °C (97.8 °F) (Temporal)   Resp 16   Ht 1.803 m (5' 11\")   Wt 90.3 kg (199 lb)   SpO2 94%   BMI 27.75 kg/m²      Physical Exam  Vitals reviewed.   Constitutional:       General: He is not in acute distress.     Appearance: Normal appearance. He is well-developed. He is not diaphoretic.   HENT:      Head: Normocephalic and atraumatic.      Nose: Nose normal.      Mouth/Throat:      Mouth: Mucous membranes are moist.      Pharynx: Oropharynx is clear.   Eyes:      Extraocular Movements: Extraocular movements intact.      Conjunctiva/sclera: Conjunctivae normal.      Pupils: Pupils are equal, round, and reactive to light.   Cardiovascular:      Rate and Rhythm: Normal rate and regular rhythm.      Pulses: Normal pulses.      Heart sounds: Normal heart sounds.   Pulmonary:      Effort: Pulmonary effort is normal. No respiratory distress.      Breath sounds: Normal breath sounds. No wheezing or rales.   Abdominal:      General: Bowel sounds are normal. There is no distension.      Palpations: Abdomen is soft. There is no mass.      Tenderness: There is no abdominal tenderness. There is no right CVA tenderness or left CVA tenderness.   Musculoskeletal:      Cervical back: Normal range of motion and neck supple.      Right lower leg: No edema.      Left lower leg: No edema.   Skin:     General: Skin is warm.      Coloration: Skin is not pale.      Findings: No erythema or rash.   Neurological:      General: No focal deficit present.      Mental Status: He is alert and oriented to person, place, and time.      Cranial Nerves: No cranial nerve deficit.      Coordination: Coordination normal.   Psychiatric:         Mood and Affect: Mood normal.         Behavior: Behavior normal.         Thought Content: Thought content normal.         Judgment: Judgment normal.       Laboratory/imaging results reviewed: d/w Pt   Latest Reference Range & Units 05/01/23 06:00 01/31/24 06:08 01/31/24 06:09 " 01/31/24 06:11 01/31/24 08:32   WBC 4.8 - 10.8 K/uL  5.3      RBC 4.70 - 6.10 M/uL  4.89      Hemoglobin 14.0 - 18.0 g/dL  15.9      Hematocrit 42.0 - 52.0 %  46.7      MCV 81.4 - 97.8 fL  95.5      MCH 27.0 - 33.0 pg  32.5      MCHC 32.3 - 36.5 g/dL  34.0      RDW 35.9 - 50.0 fL  44.7      Platelet Count 164 - 446 K/uL  279      MPV 9.0 - 12.9 fL  10.5      Sodium 135 - 145 mmol/L  140  140    Potassium 3.6 - 5.5 mmol/L  4.4  4.4    Chloride 96 - 112 mmol/L  107  107    Co2 20 - 33 mmol/L  22  23    Anion Gap 7.0 - 16.0   11.0  10.0    Glucose 65 - 99 mg/dL  96  97    Bun 8 - 22 mg/dL  25 (H)  25 (H)    Creatinine 0.50 - 1.40 mg/dL  1.53 (H)  1.50 (H)    GFR (CKD-EPI) >60 mL/min/1.73 m 2  47 !  48 !    Calcium 8.5 - 10.5 mg/dL  9.0  9.0    Correct Calcium 8.5 - 10.5 mg/dL    8.7    AST(SGOT) 12 - 45 U/L    19    ALT(SGPT) 2 - 50 U/L    19    Alkaline Phosphatase 30 - 99 U/L    32    Total Bilirubin 0.1 - 1.5 mg/dL    0.6    Albumin 3.2 - 4.9 g/dL    4.4    Total Protein 6.0 - 8.2 g/dL    6.9    Globulin 1.9 - 3.5 g/dL    2.5    A-G Ratio g/dL    1.8    EER Supersaturation Profile, Urine  See Note       Sodium, Urine -per volume mmol/L 51       Chloride, Urine-per volume mmol/L 44       Creatinine, Random Urine 800 - 2100 mg/d 1980       Total Protein, Urine 0.0 - 15.0 mg/dL 4.0       Micro Alb Creat Ratio 0 - 30 mg/g   see below     Creatinine, Urine mg/dL   134.44     Total Volume, Urine mL 3000       Total Protein, 24 Hour Urine 30.0 - 150.0 mg/24 Hr 120.0       Collection Length Hrs  Hrs 24  24       Total Volume mL  mL 3000  3000       Chloride, Urine-per 24h 140 - 250 mmol/d 132 (L)       Sodium, Urine-per 24h 51 - 286 mmol/d 153       Potassium, Urine-per 24h 25 - 125 mmol/d 57       Phosphorus, Urine-per volume mg/dL 36       Potassium, Urine-per volume mmol/L 19       Phosphorus, Urine-per 24h 400 - 1300 mg/d 1080       Calcium Random Urine mg/dL 8.7       Calcium Urine 100 - 250 mg/d 261 (H)       Calcium,  Urine Supersat  1.27       Interp. Urine Supersat  Abnormal !       Magnesium, Urine-per volume mg/dL 2.4       Magnesium, Urine per 24h 12 - 199 mg/d 72       Uric Acid, Random Urine mg/dL 31.6       Uric Acid 24H Urine 250 - 750 mg/d 948 (H)       Microalbumin, Urine Random mg/dL   <1.2     Oxalates, Urine mg/L 15       Oxalates, 24 Hour Urine 16 - 49 mg/d 45       CAHPO4, Urine Supersat  0.42       CAOX, Urine Supersat  6.01       Urobilinogen, Urine Negative   0.2      Citric Acid Urine mg/dl mg/L 183       Citric Acid Urine mg/24hr 320 - 1240 mg/d 549       Creatinine Urine mg/dL  mg/dL 66  66       Cystine, Urine 24 Hr mg/d 17       Cystine, Urine <=150 umol/g CRT 36       Cystine, Ur mg/dl mg/dL 0.57       Sulfate, Ur - per Volume mmol/L 6       Sulfate, Ur - per 24h 6 - 30 mmol/d 18       Color   Yellow      Character   Clear      Specific Gravity <1.035   1.021      Ph 5.0 - 8.0  5.46 5.5      Glucose Negative mg/dL  Negative      Ketones Negative mg/dL  Negative      Bilirubin Negative   Negative      Occult Blood Negative   Negative      Protein Negative mg/dL  Negative      Nitrite Negative   Negative      Leukocyte Esterase Negative   Trace !      Micro Urine Req   Microscopic      WBC /hpf  2-5 !      RBC /hpf  0-2 !      Epithelial Cells /hpf  Few      Bacteria None /hpf  Negative      Ca Oxalate Crystal /hpf  Few      Hyaline Cast /lpf  0-2      25-Hydroxy   Vitamin D 25 30 - 100 ng/mL    39 40   (H): Data is abnormally high  !: Data is abnormal  (L): Data is abnormally low                      Assessment & Plan        1. Stage 3a chronic kidney disease (HCC)      Creat level stable at baseline -to monitor      Keep well hydrated  - URINALYSIS; Future  - Basic Metabolic Panel; Future    2. Renovascular hypertension      BP very well controlled    3. Renal artery stenosis (HCC)      Not significant -stable    4. Vitamin D deficiency      Vit D well controlled  - PTH INTACT (PTH ONLY); Future  -  VITAMIN D 25-HYDROXY    5. Microalbuminuria      mild  - MICROALBUMIN CREAT RATIO URINE; Future    6. Anemia, unspecified type      Hb stable  - CBC WITHOUT DIFFERENTIAL; Future        Recs:  Continue current treatment  Low salt diet  Keep well hydrated  Monitor BP and call clinic if BP > 135/85  Avoid NSAID's  F/u in 4 months

## 2024-02-16 ENCOUNTER — PATIENT MESSAGE (OUTPATIENT)
Dept: HEALTH INFORMATION MANAGEMENT | Facility: OTHER | Age: 79
End: 2024-02-16

## 2024-03-11 ENCOUNTER — OFFICE VISIT (OUTPATIENT)
Dept: MEDICAL GROUP | Facility: MEDICAL CENTER | Age: 79
End: 2024-03-11
Payer: MEDICARE

## 2024-03-11 VITALS
WEIGHT: 198.85 LBS | DIASTOLIC BLOOD PRESSURE: 70 MMHG | SYSTOLIC BLOOD PRESSURE: 118 MMHG | BODY MASS INDEX: 27.84 KG/M2 | TEMPERATURE: 96.7 F | HEIGHT: 71 IN | OXYGEN SATURATION: 95 % | HEART RATE: 66 BPM

## 2024-03-11 DIAGNOSIS — E78.5 DYSLIPIDEMIA: ICD-10-CM

## 2024-03-11 DIAGNOSIS — N18.32 STAGE 3B CHRONIC KIDNEY DISEASE: ICD-10-CM

## 2024-03-11 DIAGNOSIS — I70.0 ATHEROSCLEROSIS OF AORTA (HCC): ICD-10-CM

## 2024-03-11 DIAGNOSIS — I70.1 RENAL ARTERY STENOSIS (HCC): ICD-10-CM

## 2024-03-11 DIAGNOSIS — I10 ESSENTIAL HYPERTENSION: ICD-10-CM

## 2024-03-11 PROCEDURE — 3078F DIAST BP <80 MM HG: CPT | Performed by: STUDENT IN AN ORGANIZED HEALTH CARE EDUCATION/TRAINING PROGRAM

## 2024-03-11 PROCEDURE — 3074F SYST BP LT 130 MM HG: CPT | Performed by: STUDENT IN AN ORGANIZED HEALTH CARE EDUCATION/TRAINING PROGRAM

## 2024-03-11 PROCEDURE — 99214 OFFICE O/P EST MOD 30 MIN: CPT | Performed by: STUDENT IN AN ORGANIZED HEALTH CARE EDUCATION/TRAINING PROGRAM

## 2024-03-11 ASSESSMENT — ENCOUNTER SYMPTOMS
CONSTIPATION: 0
WHEEZING: 0
DIZZINESS: 0
CHILLS: 0
ABDOMINAL PAIN: 0
PALPITATIONS: 0
NAUSEA: 0
VOMITING: 0
BLOOD IN STOOL: 0
WEIGHT LOSS: 0
FEVER: 0
DIARRHEA: 0
HEADACHES: 0
SHORTNESS OF BREATH: 0

## 2024-03-11 ASSESSMENT — FIBROSIS 4 INDEX: FIB4 SCORE: 1.2

## 2024-03-11 ASSESSMENT — PATIENT HEALTH QUESTIONNAIRE - PHQ9: CLINICAL INTERPRETATION OF PHQ2 SCORE: 0

## 2024-03-11 NOTE — PROGRESS NOTES
"Subjective:     CC: 6 month follow up for chronic disease    HPI:   Franklin presents today with    PMH HTN, hyperlipidemia, GERD CKD, hx of renal artery stenosis, hx of nephrolithiasis, bph, hx of polyp - digestive health     Followed nylk   Followed up with urology, planned for sonogram 2025  Dermatology routine screening  Digestive health - hx of polyp / gerd      Health Maintenance:     ROS:  Review of Systems   Constitutional:  Negative for chills, fever and weight loss.   HENT:  Negative for hearing loss.    Respiratory:  Negative for shortness of breath and wheezing.    Cardiovascular:  Negative for chest pain and palpitations.   Gastrointestinal:  Negative for abdominal pain, blood in stool, constipation, diarrhea, melena, nausea and vomiting.   Genitourinary:  Negative for frequency and urgency.   Skin:  Negative for rash.   Neurological:  Negative for dizziness and headaches.       Objective:     Exam:  /70 (BP Location: Left arm, Patient Position: Sitting)   Pulse 66   Temp 35.9 °C (96.7 °F) (Temporal)   Ht 1.803 m (5' 11\")   Wt 90.2 kg (198 lb 13.7 oz)   SpO2 95%   BMI 27.73 kg/m²  Body mass index is 27.73 kg/m².    Physical Exam  Constitutional:       Appearance: Normal appearance.   Cardiovascular:      Rate and Rhythm: Normal rate and regular rhythm.   Pulmonary:      Effort: Pulmonary effort is normal.      Breath sounds: Normal breath sounds.   Musculoskeletal:      Cervical back: Normal range of motion and neck supple.   Lymphadenopathy:      Cervical: No cervical adenopathy.   Neurological:      Mental Status: He is alert.           Labs:     Assessment & Plan:     79 y.o. male with the following -     1. Stage 3b chronic kidney disease (HCC)  Chronic stable  Follows with nephrology  Bicarb > 19 , avoid nephrotoxin     2. Dyslipidemia  Chronic stable on fenofibrate and lovastatin daily taking without reported a/e   - Lipid Profile; Future    3. Atherosclerosis of aorta (HCC)  Chronic " stable on lovastatin 10mg daily taking wo reported a/e     4. Renal artery stenosis (HCC), right  Chronic stable  Follows with nephrology  Bp well controlled     5. Essential hypertension  Chronic stable bp well controlled on current medication       HCC Gap Form    Diagnosis to address: N18.32 - Stage 3b chronic kidney disease (HCC)  Assessment and plan: Chronic, stable, as based on today's assessment and impact on other conditions evaluated today. Continue with current treatment plan: stable avoid ibuprofen  Follow-up with specialist as directed, but at least annually.  Last edited 03/11/24 14:01 PDT by Damien Padilla M.D.           Return in about 6 months (around 9/11/2024) for Med check, Lab review.    Please note that this dictation was created using voice recognition software. I have made every reasonable attempt to correct obvious errors, but I expect that there are errors of grammar and possibly content that I did not discover before finalizing the note.

## 2024-04-22 DIAGNOSIS — R45.86 MOOD AND AFFECT DISTURBANCE: ICD-10-CM

## 2024-04-22 DIAGNOSIS — R45.86 MOOD CHANGE: ICD-10-CM

## 2024-04-24 ENCOUNTER — TELEPHONE (OUTPATIENT)
Dept: HEALTH INFORMATION MANAGEMENT | Facility: OTHER | Age: 79
End: 2024-04-24

## 2024-05-17 ENCOUNTER — HOSPITAL ENCOUNTER (OUTPATIENT)
Dept: LAB | Facility: MEDICAL CENTER | Age: 79
End: 2024-05-17
Attending: INTERNAL MEDICINE
Payer: MEDICARE

## 2024-05-17 DIAGNOSIS — R80.9 MICROALBUMINURIA: ICD-10-CM

## 2024-05-17 DIAGNOSIS — D64.9 ANEMIA, UNSPECIFIED TYPE: ICD-10-CM

## 2024-05-17 DIAGNOSIS — E55.9 VITAMIN D DEFICIENCY: ICD-10-CM

## 2024-05-17 DIAGNOSIS — N18.31 STAGE 3A CHRONIC KIDNEY DISEASE: ICD-10-CM

## 2024-05-17 LAB
25(OH)D3 SERPL-MCNC: 46 NG/ML (ref 30–100)
ANION GAP SERPL CALC-SCNC: 12 MMOL/L (ref 7–16)
APPEARANCE UR: CLEAR
BACTERIA #/AREA URNS HPF: NEGATIVE /HPF
BILIRUB UR QL STRIP.AUTO: NEGATIVE
BUN SERPL-MCNC: 23 MG/DL (ref 8–22)
CALCIUM SERPL-MCNC: 9.6 MG/DL (ref 8.5–10.5)
CHLORIDE SERPL-SCNC: 106 MMOL/L (ref 96–112)
CO2 SERPL-SCNC: 23 MMOL/L (ref 20–33)
COLOR UR: YELLOW
CREAT SERPL-MCNC: 1.51 MG/DL (ref 0.5–1.4)
EPI CELLS #/AREA URNS HPF: ABNORMAL /HPF
ERYTHROCYTE [DISTWIDTH] IN BLOOD BY AUTOMATED COUNT: 42.5 FL (ref 35.9–50)
FASTING STATUS PATIENT QL REPORTED: NORMAL
GFR SERPLBLD CREATININE-BSD FMLA CKD-EPI: 47 ML/MIN/1.73 M 2
GLUCOSE SERPL-MCNC: 98 MG/DL (ref 65–99)
GLUCOSE UR STRIP.AUTO-MCNC: NEGATIVE MG/DL
HCT VFR BLD AUTO: 47.6 % (ref 42–52)
HGB BLD-MCNC: 16.4 G/DL (ref 14–18)
HYALINE CASTS #/AREA URNS LPF: ABNORMAL /LPF
KETONES UR STRIP.AUTO-MCNC: NEGATIVE MG/DL
LEUKOCYTE ESTERASE UR QL STRIP.AUTO: ABNORMAL
MCH RBC QN AUTO: 32.1 PG (ref 27–33)
MCHC RBC AUTO-ENTMCNC: 34.5 G/DL (ref 32.3–36.5)
MCV RBC AUTO: 93.2 FL (ref 81.4–97.8)
MICRO URNS: ABNORMAL
NITRITE UR QL STRIP.AUTO: NEGATIVE
PH UR STRIP.AUTO: 5.5 [PH] (ref 5–8)
PLATELET # BLD AUTO: 301 K/UL (ref 164–446)
PMV BLD AUTO: 10.8 FL (ref 9–12.9)
POTASSIUM SERPL-SCNC: 4.7 MMOL/L (ref 3.6–5.5)
PROT UR QL STRIP: NEGATIVE MG/DL
PTH-INTACT SERPL-MCNC: 18.1 PG/ML (ref 14–72)
RBC # BLD AUTO: 5.11 M/UL (ref 4.7–6.1)
RBC # URNS HPF: ABNORMAL /HPF
RBC UR QL AUTO: NEGATIVE
SODIUM SERPL-SCNC: 141 MMOL/L (ref 135–145)
SP GR UR STRIP.AUTO: 1.02
UROBILINOGEN UR STRIP.AUTO-MCNC: 0.2 MG/DL
WBC # BLD AUTO: 6 K/UL (ref 4.8–10.8)
WBC #/AREA URNS HPF: ABNORMAL /HPF

## 2024-05-18 LAB
CREAT UR-MCNC: 97.82 MG/DL
MICROALBUMIN UR-MCNC: <1.2 MG/DL
MICROALBUMIN/CREAT UR: NORMAL MG/G (ref 0–30)

## 2024-06-06 ENCOUNTER — OFFICE VISIT (OUTPATIENT)
Dept: NEPHROLOGY | Facility: MEDICAL CENTER | Age: 79
End: 2024-06-06
Payer: MEDICARE

## 2024-06-06 VITALS
SYSTOLIC BLOOD PRESSURE: 122 MMHG | HEART RATE: 79 BPM | WEIGHT: 194 LBS | HEIGHT: 71 IN | TEMPERATURE: 98.5 F | OXYGEN SATURATION: 93 % | BODY MASS INDEX: 27.16 KG/M2 | DIASTOLIC BLOOD PRESSURE: 70 MMHG

## 2024-06-06 DIAGNOSIS — I70.1 RENAL ARTERY STENOSIS (HCC): ICD-10-CM

## 2024-06-06 DIAGNOSIS — D64.9 ANEMIA, UNSPECIFIED TYPE: ICD-10-CM

## 2024-06-06 DIAGNOSIS — N20.0 CALCULUS OF KIDNEY: ICD-10-CM

## 2024-06-06 DIAGNOSIS — I15.0 RENOVASCULAR HYPERTENSION: ICD-10-CM

## 2024-06-06 DIAGNOSIS — R80.9 MICROALBUMINURIA: ICD-10-CM

## 2024-06-06 DIAGNOSIS — E55.9 VITAMIN D DEFICIENCY: ICD-10-CM

## 2024-06-06 DIAGNOSIS — N18.31 STAGE 3A CHRONIC KIDNEY DISEASE: ICD-10-CM

## 2024-06-06 PROCEDURE — 3078F DIAST BP <80 MM HG: CPT | Performed by: INTERNAL MEDICINE

## 2024-06-06 PROCEDURE — 3074F SYST BP LT 130 MM HG: CPT | Performed by: INTERNAL MEDICINE

## 2024-06-06 PROCEDURE — 99214 OFFICE O/P EST MOD 30 MIN: CPT | Performed by: INTERNAL MEDICINE

## 2024-06-06 ASSESSMENT — ENCOUNTER SYMPTOMS
SINUS PAIN: 0
WHEEZING: 0
ABDOMINAL PAIN: 0
EYES NEGATIVE: 1
ORTHOPNEA: 0
WEIGHT LOSS: 0
PALPITATIONS: 0
SHORTNESS OF BREATH: 0
HEMOPTYSIS: 0
COUGH: 0
FEVER: 0
VOMITING: 0
FLANK PAIN: 0
NAUSEA: 0
CHILLS: 0
DIARRHEA: 0

## 2024-06-06 ASSESSMENT — FIBROSIS 4 INDEX: FIB4 SCORE: 1.12

## 2024-06-06 NOTE — PROGRESS NOTES
Subjective     Honorio Mejía is a 79 y.o. male who presents with Chronic Kidney Disease            HPI  Honorio is coming today for f/u  of CKD III  States doing well  No complaints  Good appetite and energy level  CKD IIIa - baseline creat level at 1.5-1.6 -stable  HTN: BP very well controlled  WANDA -not significant  Hx/of nephrolithiasis -stable -f/u with Urology    Review of Systems   Constitutional:  Negative for chills, fever, malaise/fatigue and weight loss.   HENT:  Negative for congestion, hearing loss and sinus pain.    Eyes: Negative.    Respiratory:  Negative for cough, hemoptysis, shortness of breath and wheezing.    Cardiovascular:  Negative for chest pain, palpitations, orthopnea and leg swelling.   Gastrointestinal:  Negative for abdominal pain, diarrhea, nausea and vomiting.   Genitourinary:  Negative for dysuria, flank pain, frequency, hematuria and urgency.   Skin: Negative.    All other systems reviewed and are negative.         Past Medical History:   Diagnosis Date    Heart burn     Hiatus hernia syndrome 2010    High cholesterol 2007    Hyperlipidemia     Hypertension     pt states well controlled on meds    Indigestion 2010    Kidney stone     stones    Other male erectile dysfunction 2021    Urinary bladder disorder 2011    Bladder stone.       Family History   Problem Relation Age of Onset    Cancer Mother         . Complications of colon cancer    Heart Disease Brother     No Known Problems Maternal Grandmother     Hypertension Maternal Grandfather        Social History     Socioeconomic History    Marital status:     Highest education level: Bachelor's degree (e.g., BA, AB, BS)   Tobacco Use    Smoking status: Former     Current packs/day: 0.00     Average packs/day: 1 pack/day for 15.0 years (15.0 ttl pk-yrs)     Types: Cigarettes     Start date: 1972     Quit date: 1987     Years since quittin.4    Smokeless tobacco: Never   Vaping Use    Vaping  status: Never Used   Substance and Sexual Activity    Alcohol use: Yes     Alcohol/week: 0.6 oz     Types: 1 Standard drinks or equivalent per week     Comment: 3-4 per week    Drug use: No    Sexual activity: Yes     Partners: Female     Social Determinants of Health     Financial Resource Strain: Low Risk  (3/4/2023)    Overall Financial Resource Strain (CARDIA)     Difficulty of Paying Living Expenses: Not hard at all   Food Insecurity: No Food Insecurity (3/4/2023)    Hunger Vital Sign     Worried About Running Out of Food in the Last Year: Never true     Ran Out of Food in the Last Year: Never true   Transportation Needs: No Transportation Needs (3/4/2023)    PRAPARE - Transportation     Lack of Transportation (Medical): No     Lack of Transportation (Non-Medical): No   Physical Activity: Sufficiently Active (3/4/2023)    Exercise Vital Sign     Days of Exercise per Week: 4 days     Minutes of Exercise per Session: 90 min   Stress: No Stress Concern Present (3/4/2023)    [employer] [place] [employer] - Occupational Stress Questionnaire     Feeling of Stress : Only a little   Social Connections: Socially Integrated (3/4/2023)    Social Connection and Isolation Panel [NHANES]     Frequency of Communication with Friends and Family: More than three times a week     Frequency of Social Gatherings with Friends and Family: Once a week     Attends Church Services: More than 4 times per year     Active Member of Clubs or Organizations: Yes     Attends Club or Organization Meetings: More than 4 times per year     Marital Status:    Housing Stability: Low Risk  (3/4/2023)    Housing Stability Vital Sign     Unable to Pay for Housing in the Last Year: No     Number of Places Lived in the Last Year: 1     Unstable Housing in the Last Year: No         Objective     /70 (BP Location: Right arm, Patient Position: Sitting, BP Cuff Size: Adult)   Pulse 79   Temp 36.9 °C (98.5 °F) (Temporal)   Ht  "1.803 m (5' 11\")   Wt 88 kg (194 lb)   SpO2 93%   BMI 27.06 kg/m²      Physical Exam  Vitals reviewed.   Constitutional:       General: He is not in acute distress.     Appearance: Normal appearance. He is well-developed. He is not diaphoretic.   HENT:      Head: Normocephalic and atraumatic.      Nose: Nose normal.      Mouth/Throat:      Mouth: Mucous membranes are moist.      Pharynx: Oropharynx is clear.   Eyes:      Extraocular Movements: Extraocular movements intact.      Conjunctiva/sclera: Conjunctivae normal.      Pupils: Pupils are equal, round, and reactive to light.   Cardiovascular:      Rate and Rhythm: Normal rate and regular rhythm.      Pulses: Normal pulses.      Heart sounds: Normal heart sounds.   Pulmonary:      Effort: Pulmonary effort is normal. No respiratory distress.      Breath sounds: Normal breath sounds. No wheezing or rales.   Abdominal:      General: Bowel sounds are normal. There is no distension.      Palpations: Abdomen is soft. There is no mass.      Tenderness: There is no abdominal tenderness. There is no right CVA tenderness or left CVA tenderness.   Musculoskeletal:      Cervical back: Normal range of motion and neck supple.      Right lower leg: No edema.      Left lower leg: No edema.   Skin:     General: Skin is warm.      Coloration: Skin is not pale.      Findings: No erythema or rash.   Neurological:      General: No focal deficit present.      Mental Status: He is alert and oriented to person, place, and time.      Cranial Nerves: No cranial nerve deficit.      Coordination: Coordination normal.   Psychiatric:         Mood and Affect: Mood normal.         Behavior: Behavior normal.         Thought Content: Thought content normal.         Judgment: Judgment normal.       Laboratory/imaging results reviewed: d/w Pt     Latest Reference Range & Units 01/31/24 06:08 01/31/24 06:09 01/31/24 06:11 01/31/24 08:32 05/17/24 06:07 05/17/24 06:15   WBC 4.8 - 10.8 K/uL 5.3    " 6.0    RBC 4.70 - 6.10 M/uL 4.89    5.11    Hemoglobin 14.0 - 18.0 g/dL 15.9    16.4    Hematocrit 42.0 - 52.0 % 46.7    47.6    MCV 81.4 - 97.8 fL 95.5    93.2    MCH 27.0 - 33.0 pg 32.5    32.1    MCHC 32.3 - 36.5 g/dL 34.0    34.5    RDW 35.9 - 50.0 fL 44.7    42.5    Platelet Count 164 - 446 K/uL 279    301    MPV 9.0 - 12.9 fL 10.5    10.8    Sodium 135 - 145 mmol/L 140  140  141    Potassium 3.6 - 5.5 mmol/L 4.4  4.4  4.7    Chloride 96 - 112 mmol/L 107  107  106    Co2 20 - 33 mmol/L 22  23  23    Anion Gap 7.0 - 16.0  11.0  10.0  12.0    Glucose 65 - 99 mg/dL 96  97  98    Bun 8 - 22 mg/dL 25 (H)  25 (H)  23 (H)    Creatinine 0.50 - 1.40 mg/dL 1.53 (H)  1.50 (H)  1.51 (H)    GFR (CKD-EPI) >60 mL/min/1.73 m 2 47 !  48 !  47 !    Calcium 8.5 - 10.5 mg/dL 9.0  9.0  9.6    Correct Calcium 8.5 - 10.5 mg/dL   8.7      AST(SGOT) 12 - 45 U/L   19      ALT(SGPT) 2 - 50 U/L   19      Alkaline Phosphatase 30 - 99 U/L   32      Total Bilirubin 0.1 - 1.5 mg/dL   0.6      Albumin 3.2 - 4.9 g/dL   4.4      Total Protein 6.0 - 8.2 g/dL   6.9      Globulin 1.9 - 3.5 g/dL   2.5      A-G Ratio g/dL   1.8      Fasting Status      Fasting    Micro Alb Creat Ratio 0 - 30 mg/g  see below    see below   Creatinine, Urine mg/dL  134.44    97.82   Microalbumin, Urine Random mg/dL  <1.2    <1.2   Urobilinogen, Urine Negative  0.2     0.2   Color  Yellow     Yellow   Character  Clear     Clear   Specific Gravity <1.035  1.021     1.018   Ph 5.0 - 8.0  5.5     5.5   Glucose Negative mg/dL Negative     Negative   Ketones Negative mg/dL Negative     Negative   Bilirubin Negative  Negative     Negative   Occult Blood Negative  Negative     Negative   Protein Negative mg/dL Negative     Negative   Nitrite Negative  Negative     Negative   Leukocyte Esterase Negative  Trace !     Small !   Micro Urine Req  Microscopic     Microscopic   WBC /hpf 2-5 !     2-5 !   RBC /hpf 0-2 !     0-2 !   Epithelial Cells /hpf Few     Few   Bacteria None  /hpf Negative     Negative   Ca Oxalate Crystal /hpf Few        Hyaline Cast /lpf 0-2     0-2   25-Hydroxy   Vitamin D 25 30 - 100 ng/mL   39 40 46    Pth, Intact 14.0 - 72.0 pg/mL     18.1    (H): Data is abnormally high  !: Data is abnormal             Assessment & Plan        1. Stage 3a chronic kidney disease (HCC)      Creat level stable at baseline -to monitor      Keep well hydrated      2. Renovascular hypertension      BP very well controlled    3. Renal artery stenosis (HCC)      Not significant -stable    4. Vitamin D deficiency      Vit D and PTH  well controlled      5. Microalbuminuria      mild  - MICROALBUMIN CREAT RATIO URINE; Future    6. Anemia, unspecified type      Hb stable  - CBC WITHOUT DIFFERENTIAL; Future  7.Nephrolithiasis: stable -f/u with Urology      Recs:  Continue current treatment  Low salt diet  Keep well hydrated  Monitor BP and call clinic if BP > 135/85  Avoid NSAID's  F/u in 6 months

## 2024-07-24 DIAGNOSIS — J30.89 CHRONIC NONSEASONAL ALLERGIC RHINITIS DUE TO POLLEN: ICD-10-CM

## 2024-07-24 RX ORDER — FLUTICASONE PROPIONATE 50 MCG
SPRAY, SUSPENSION (ML) NASAL
Qty: 48 G | Refills: 1 | Status: SHIPPED | OUTPATIENT
Start: 2024-07-24

## 2024-09-06 ENCOUNTER — HOSPITAL ENCOUNTER (OUTPATIENT)
Dept: LAB | Facility: MEDICAL CENTER | Age: 79
End: 2024-09-06
Attending: STUDENT IN AN ORGANIZED HEALTH CARE EDUCATION/TRAINING PROGRAM
Payer: MEDICARE

## 2024-09-06 DIAGNOSIS — E78.5 DYSLIPIDEMIA: ICD-10-CM

## 2024-09-06 LAB
25(OH)D3 SERPL-MCNC: 51 NG/ML (ref 30–100)
ALBUMIN SERPL BCP-MCNC: 4.4 G/DL (ref 3.2–4.9)
ALBUMIN/GLOB SERPL: 1.8 G/DL
ALP SERPL-CCNC: 33 U/L (ref 30–99)
ALT SERPL-CCNC: 17 U/L (ref 2–50)
ANION GAP SERPL CALC-SCNC: 11 MMOL/L (ref 7–16)
AST SERPL-CCNC: 19 U/L (ref 12–45)
BILIRUB SERPL-MCNC: 0.9 MG/DL (ref 0.1–1.5)
BUN SERPL-MCNC: 22 MG/DL (ref 8–22)
CALCIUM ALBUM COR SERPL-MCNC: 9 MG/DL (ref 8.5–10.5)
CALCIUM SERPL-MCNC: 9.3 MG/DL (ref 8.5–10.5)
CHLORIDE SERPL-SCNC: 108 MMOL/L (ref 96–112)
CHOLEST SERPL-MCNC: 173 MG/DL (ref 100–199)
CO2 SERPL-SCNC: 23 MMOL/L (ref 20–33)
CREAT SERPL-MCNC: 1.76 MG/DL (ref 0.5–1.4)
GFR SERPLBLD CREATININE-BSD FMLA CKD-EPI: 39 ML/MIN/1.73 M 2
GLOBULIN SER CALC-MCNC: 2.5 G/DL (ref 1.9–3.5)
GLUCOSE SERPL-MCNC: 106 MG/DL (ref 65–99)
HDLC SERPL-MCNC: 51 MG/DL
LDLC SERPL CALC-MCNC: 90 MG/DL
POTASSIUM SERPL-SCNC: 4.6 MMOL/L (ref 3.6–5.5)
PROT SERPL-MCNC: 6.9 G/DL (ref 6–8.2)
SODIUM SERPL-SCNC: 142 MMOL/L (ref 135–145)
TRIGL SERPL-MCNC: 162 MG/DL (ref 0–149)

## 2024-09-06 PROCEDURE — 80061 LIPID PANEL: CPT

## 2024-09-06 PROCEDURE — 82306 VITAMIN D 25 HYDROXY: CPT

## 2024-09-06 PROCEDURE — 80053 COMPREHEN METABOLIC PANEL: CPT

## 2024-09-06 PROCEDURE — 36415 COLL VENOUS BLD VENIPUNCTURE: CPT

## 2024-09-23 DIAGNOSIS — N18.32 STAGE 3B CHRONIC KIDNEY DISEASE: ICD-10-CM

## 2024-10-07 ENCOUNTER — HOSPITAL ENCOUNTER (OUTPATIENT)
Dept: RADIOLOGY | Facility: MEDICAL CENTER | Age: 79
End: 2024-10-07
Attending: PHYSICIAN ASSISTANT
Payer: MEDICARE

## 2024-10-07 DIAGNOSIS — S76.112A STRAIN OF LEFT QUADRICEPS TENDON, INITIAL ENCOUNTER: ICD-10-CM

## 2024-10-07 DIAGNOSIS — M89.8X5 PAIN OF LEFT FEMUR: ICD-10-CM

## 2024-10-07 PROCEDURE — 73721 MRI JNT OF LWR EXTRE W/O DYE: CPT | Mod: LT

## 2024-10-16 ENCOUNTER — APPOINTMENT (OUTPATIENT)
Dept: MEDICAL GROUP | Facility: MEDICAL CENTER | Age: 79
End: 2024-10-16
Payer: MEDICARE

## 2024-10-29 DIAGNOSIS — I10 ESSENTIAL HYPERTENSION: ICD-10-CM

## 2024-10-29 RX ORDER — AMLODIPINE BESYLATE 2.5 MG/1
2.5 TABLET ORAL DAILY
Qty: 100 TABLET | Refills: 2 | Status: SHIPPED | OUTPATIENT
Start: 2024-10-29

## 2024-11-19 ENCOUNTER — OFFICE VISIT (OUTPATIENT)
Dept: MEDICAL GROUP | Facility: MEDICAL CENTER | Age: 79
End: 2024-11-19
Payer: MEDICARE

## 2024-11-19 VITALS
RESPIRATION RATE: 17 BRPM | OXYGEN SATURATION: 92 % | HEIGHT: 71 IN | WEIGHT: 193 LBS | SYSTOLIC BLOOD PRESSURE: 110 MMHG | BODY MASS INDEX: 27.02 KG/M2 | DIASTOLIC BLOOD PRESSURE: 64 MMHG | TEMPERATURE: 97.7 F | HEART RATE: 74 BPM

## 2024-11-19 DIAGNOSIS — I10 ESSENTIAL HYPERTENSION: ICD-10-CM

## 2024-11-19 DIAGNOSIS — R06.83 SNORING: ICD-10-CM

## 2024-11-19 PROCEDURE — 99213 OFFICE O/P EST LOW 20 MIN: CPT | Performed by: STUDENT IN AN ORGANIZED HEALTH CARE EDUCATION/TRAINING PROGRAM

## 2024-11-19 PROCEDURE — 3074F SYST BP LT 130 MM HG: CPT | Performed by: STUDENT IN AN ORGANIZED HEALTH CARE EDUCATION/TRAINING PROGRAM

## 2024-11-19 PROCEDURE — G2211 COMPLEX E/M VISIT ADD ON: HCPCS | Performed by: STUDENT IN AN ORGANIZED HEALTH CARE EDUCATION/TRAINING PROGRAM

## 2024-11-19 PROCEDURE — 3078F DIAST BP <80 MM HG: CPT | Performed by: STUDENT IN AN ORGANIZED HEALTH CARE EDUCATION/TRAINING PROGRAM

## 2024-11-19 ASSESSMENT — ENCOUNTER SYMPTOMS
VOMITING: 0
WHEEZING: 0
CHILLS: 0
FEVER: 0
PALPITATIONS: 0
DIZZINESS: 0
NAUSEA: 0
SHORTNESS OF BREATH: 0
WEIGHT LOSS: 0
HEADACHES: 0

## 2024-11-19 ASSESSMENT — FIBROSIS 4 INDEX: FIB4 SCORE: 1.18

## 2024-11-19 NOTE — PROGRESS NOTES
"Subjective:     CC: Concerned about sleep apnea    HPI:   Franklin presents today with    PMH HTN, hyperlipidemia, GERD CKD 3A, hx of renal artery stenosis, hx of nephrolithiasis, bph, hx of polyp - digestive health   Specialists  Dermatology  Gastroenterology-history of polyps GERD  Nephrology [facility]  Urology repeat sonogram planned 2025    #Snoring    Verbal consent was acquired by the patient to use [employer] ambient listening note generation during this visit Yes   History of Present Illness  The patient presents for evaluation of sleep apnea.    He is scheduled for quadriceps tendon surgery and has expressed concerns about potential sleep apnea, seeking a referral to an ENT specialist. He reports difficulty sleeping, particularly when not on his left side, which he is currently unable to do. His wife has observed episodes of him ceasing to breathe during sleep, and he himself has noticed breathing difficulties. He has not undergone a sleep study previously. He does not experience daytime fatigue. He uses a nasal dilator and finds that he sleeps best when breathing through his nose.    He also has issues with high blood pressure.    He broke his quadriceps while working in the yard. He is doing quite well. He will see the surgeon on Thursday and hopes to be told that he can start sleeping without the brace.      Health Maintenance:     ROS:  Review of Systems   Constitutional:  Negative for chills, fever and weight loss.   HENT:  Negative for hearing loss.    Respiratory:  Negative for shortness of breath and wheezing.    Cardiovascular:  Negative for chest pain and palpitations.   Gastrointestinal:  Negative for nausea and vomiting.   Genitourinary:  Negative for frequency and urgency.   Skin:  Negative for rash.   Neurological:  Negative for dizziness and headaches.       Objective:     Exam:  /64 (BP Location: Left arm)   Pulse 74   Temp 36.5 °C (97.7 °F)   Resp 17   Ht 1.803 m (5' 11\")   Wt 87.5 kg " (193 lb)   SpO2 92%   BMI 26.92 kg/m²  Body mass index is 26.92 kg/m².    Physical Exam  Constitutional:       Appearance: Normal appearance.   HENT:      Head: Normocephalic and atraumatic.   Pulmonary:      Effort: No respiratory distress.   Musculoskeletal:      Cervical back: Normal range of motion and neck supple.   Neurological:      Mental Status: He is alert.   Psychiatric:         Mood and Affect: Mood normal.         Behavior: Behavior normal.           Labs:     Assessment & Plan:     79 y.o. male with the following -     1. Snoring  Snoring concerning for sleep apnea in the setting of hypertension, renal artery stenosis and CKD.  Blood pressure well-controlled  Patient currently tolerating medication amlodipine 2.5 mg, losartan 100 mg daily taking without any issues  Plan  - Overnight Home Sleep Study; Future  STOP-BANG 5    Return if symptoms worsen or fail to improve.    Please note that this dictation was created using voice recognition software. I have made every reasonable attempt to correct obvious errors, but I expect that there are errors of grammar and possibly content that I did not discover before finalizing the note.

## 2024-12-03 ENCOUNTER — HOSPITAL ENCOUNTER (OUTPATIENT)
Dept: LAB | Facility: MEDICAL CENTER | Age: 79
End: 2024-12-03
Attending: INTERNAL MEDICINE
Payer: MEDICARE

## 2024-12-03 DIAGNOSIS — I15.0 RENOVASCULAR HYPERTENSION: ICD-10-CM

## 2024-12-03 DIAGNOSIS — N18.31 STAGE 3A CHRONIC KIDNEY DISEASE: ICD-10-CM

## 2024-12-03 DIAGNOSIS — N20.0 CALCULUS OF KIDNEY: ICD-10-CM

## 2024-12-03 LAB
ANION GAP SERPL CALC-SCNC: 12 MMOL/L (ref 7–16)
APPEARANCE UR: CLEAR
BACTERIA #/AREA URNS HPF: ABNORMAL /HPF
BILIRUB UR QL STRIP.AUTO: NEGATIVE
BUN SERPL-MCNC: 25 MG/DL (ref 8–22)
CA OXALATE CRYSTAL  1765: PRESENT /HPF
CALCIUM SERPL-MCNC: 9.4 MG/DL (ref 8.5–10.5)
CASTS URNS QL MICRO: ABNORMAL /LPF (ref 0–2)
CHLORIDE SERPL-SCNC: 106 MMOL/L (ref 96–112)
CO2 SERPL-SCNC: 23 MMOL/L (ref 20–33)
COLOR UR: YELLOW
CREAT SERPL-MCNC: 1.38 MG/DL (ref 0.5–1.4)
EPITHELIAL CELLS 1715: ABNORMAL /HPF (ref 0–5)
GFR SERPLBLD CREATININE-BSD FMLA CKD-EPI: 52 ML/MIN/1.73 M 2
GLUCOSE SERPL-MCNC: 90 MG/DL (ref 65–99)
GLUCOSE UR STRIP.AUTO-MCNC: NEGATIVE MG/DL
KETONES UR STRIP.AUTO-MCNC: NEGATIVE MG/DL
LEUKOCYTE ESTERASE UR QL STRIP.AUTO: ABNORMAL
MICRO URNS: ABNORMAL
NITRITE UR QL STRIP.AUTO: NEGATIVE
PH UR STRIP.AUTO: 5.5 [PH] (ref 5–8)
POTASSIUM SERPL-SCNC: 4.8 MMOL/L (ref 3.6–5.5)
PROT UR QL STRIP: NEGATIVE MG/DL
RBC # URNS HPF: ABNORMAL /HPF (ref 0–2)
RBC UR QL AUTO: NEGATIVE
SODIUM SERPL-SCNC: 141 MMOL/L (ref 135–145)
SP GR UR STRIP.AUTO: 1.02
UROBILINOGEN UR STRIP.AUTO-MCNC: 0.2 EU/DL
WBC #/AREA URNS HPF: ABNORMAL /HPF

## 2024-12-03 PROCEDURE — 80048 BASIC METABOLIC PNL TOTAL CA: CPT

## 2024-12-03 PROCEDURE — 81001 URINALYSIS AUTO W/SCOPE: CPT

## 2024-12-03 PROCEDURE — 36415 COLL VENOUS BLD VENIPUNCTURE: CPT

## 2024-12-11 ENCOUNTER — OFFICE VISIT (OUTPATIENT)
Dept: NEPHROLOGY | Facility: MEDICAL CENTER | Age: 79
End: 2024-12-11
Payer: MEDICARE

## 2024-12-11 VITALS
HEIGHT: 71 IN | DIASTOLIC BLOOD PRESSURE: 64 MMHG | BODY MASS INDEX: 27.44 KG/M2 | SYSTOLIC BLOOD PRESSURE: 104 MMHG | OXYGEN SATURATION: 96 % | HEART RATE: 82 BPM | TEMPERATURE: 97.7 F | WEIGHT: 196 LBS

## 2024-12-11 DIAGNOSIS — N18.31 STAGE 3A CHRONIC KIDNEY DISEASE: ICD-10-CM

## 2024-12-11 DIAGNOSIS — I15.0 RENOVASCULAR HYPERTENSION: ICD-10-CM

## 2024-12-11 DIAGNOSIS — E55.9 VITAMIN D DEFICIENCY: ICD-10-CM

## 2024-12-11 DIAGNOSIS — D64.9 ANEMIA, UNSPECIFIED TYPE: ICD-10-CM

## 2024-12-11 DIAGNOSIS — I70.1 RENAL ARTERY STENOSIS (HCC): ICD-10-CM

## 2024-12-11 DIAGNOSIS — R80.9 MICROALBUMINURIA: ICD-10-CM

## 2024-12-11 PROCEDURE — 99213 OFFICE O/P EST LOW 20 MIN: CPT | Performed by: INTERNAL MEDICINE

## 2024-12-11 PROCEDURE — 3074F SYST BP LT 130 MM HG: CPT | Performed by: INTERNAL MEDICINE

## 2024-12-11 PROCEDURE — 3078F DIAST BP <80 MM HG: CPT | Performed by: INTERNAL MEDICINE

## 2024-12-11 PROCEDURE — G2211 COMPLEX E/M VISIT ADD ON: HCPCS | Performed by: INTERNAL MEDICINE

## 2024-12-11 ASSESSMENT — ENCOUNTER SYMPTOMS
CHILLS: 0
NAUSEA: 0
SINUS PAIN: 0
SHORTNESS OF BREATH: 0
WEIGHT LOSS: 0
FEVER: 0
EYES NEGATIVE: 1
VOMITING: 0
WHEEZING: 0
COUGH: 0
PALPITATIONS: 0
HEMOPTYSIS: 0
ORTHOPNEA: 0

## 2024-12-11 ASSESSMENT — FIBROSIS 4 INDEX: FIB4 SCORE: 1.18

## 2024-12-11 NOTE — PROGRESS NOTES
Subjective     Honorio Mejía is a 79 y.o. male who presents with Chronic Kidney Disease            HPI  Honorio is coming today for f/u  of CKD III  S/p quadricepts tear repair  No complaints, pain controlled  Good appetite and energy level  CKD IIIa - baseline creat level at 1.5-1.6 -stable  HTN: BP well controlled  WANDA -not significant  Hx/of nephrolithiasis/LUTS --f/u with Urology    Review of Systems   Constitutional:  Negative for chills, fever, malaise/fatigue and weight loss.   HENT:  Negative for congestion, hearing loss and sinus pain.    Eyes: Negative.    Respiratory:  Negative for cough, hemoptysis, shortness of breath and wheezing.    Cardiovascular:  Negative for chest pain, palpitations, orthopnea and leg swelling.   Gastrointestinal:  Negative for nausea and vomiting.   Musculoskeletal:  Positive for joint pain.   Skin: Negative.    All other systems reviewed and are negative.         Past Medical History:   Diagnosis Date    Heart burn 2010    Hiatus hernia syndrome 2010    High cholesterol 2007    Hyperlipidemia     Hypertension     pt states well controlled on meds    Indigestion 2010    Kidney stone     stones    Other male erectile dysfunction 2021    Urinary bladder disorder 2011    Bladder stone.       Family History   Problem Relation Age of Onset    Cancer Mother         . Complications of colon cancer    Heart Disease Brother     No Known Problems Maternal Grandmother     Hypertension Maternal Grandfather        Social History     Socioeconomic History    Marital status:     Highest education level: Bachelor's degree (e.g., BA, AB, BS)   Tobacco Use    Smoking status: Former     Current packs/day: 0.00     Average packs/day: 1 pack/day for 15.0 years (15.0 ttl pk-yrs)     Types: Cigarettes     Start date: 1972     Quit date: 1987     Years since quittin.9    Smokeless tobacco: Never   Vaping Use    Vaping status: Never Used   Substance and Sexual Activity  "   Alcohol use: Yes     Alcohol/week: 0.6 oz     Types: 1 Standard drinks or equivalent per week     Comment: 3-4 per week    Drug use: No    Sexual activity: Yes     Partners: Female     Social Drivers of Health     Financial Resource Strain: Low Risk  (3/4/2023)    Overall Financial Resource Strain (CARDIA)     Difficulty of Paying Living Expenses: Not hard at all   Food Insecurity: No Food Insecurity (3/4/2023)    Hunger Vital Sign     Worried About Running Out of Food in the Last Year: Never true     Ran Out of Food in the Last Year: Never true   Transportation Needs: No Transportation Needs (3/4/2023)    PRAPARE - Transportation     Lack of Transportation (Medical): No     Lack of Transportation (Non-Medical): No   Physical Activity: Sufficiently Active (3/4/2023)    Exercise Vital Sign     Days of Exercise per Week: 4 days     Minutes of Exercise per Session: 90 min   Stress: No Stress Concern Present (3/4/2023)    [employer] [place] [employer] - Occupational Stress Questionnaire     Feeling of Stress : Only a little   Social Connections: Socially Integrated (3/4/2023)    Social Connection and Isolation Panel [NHANES]     Frequency of Communication with Friends and Family: More than three times a week     Frequency of Social Gatherings with Friends and Family: Once a week     Attends Sabian[employer] Services: More than 4 times per year     Active Member of Clubs or Organizations: Yes     Attends Club or Organization Meetings: More than 4 times per year     Marital Status:    Housing Stability: Low Risk  (3/4/2023)    Housing Stability Vital Sign     Unable to Pay for Housing in the Last Year: No     Number of Places Lived in the Last Year: 1     Unstable Housing in the Last Year: No         Objective     /64 (BP Location: Right arm, Patient Position: Sitting, BP Cuff Size: Adult)   Pulse 82   Temp 36.5 °C (97.7 °F) (Temporal)   Ht 1.803 m (5' 11\")   Wt 88.9 kg (196 lb)   SpO2 96%   BMI " 27.34 kg/m²      Physical Exam  Vitals reviewed.   Constitutional:       General: He is not in acute distress.     Appearance: Normal appearance. He is well-developed. He is not diaphoretic.   HENT:      Head: Normocephalic and atraumatic.      Nose: Nose normal.      Mouth/Throat:      Mouth: Mucous membranes are moist.      Pharynx: Oropharynx is clear.   Eyes:      Extraocular Movements: Extraocular movements intact.      Conjunctiva/sclera: Conjunctivae normal.      Pupils: Pupils are equal, round, and reactive to light.   Cardiovascular:      Rate and Rhythm: Normal rate and regular rhythm.      Pulses: Normal pulses.      Heart sounds: Normal heart sounds.   Pulmonary:      Effort: Pulmonary effort is normal. No respiratory distress.      Breath sounds: Normal breath sounds. No wheezing or rales.   Abdominal:      General: Bowel sounds are normal. There is no distension.      Palpations: Abdomen is soft. There is no mass.      Tenderness: There is no abdominal tenderness. There is no right CVA tenderness or left CVA tenderness.   Musculoskeletal:      Cervical back: Normal range of motion and neck supple.      Right lower leg: No edema.      Left lower leg: No edema.   Skin:     General: Skin is warm.      Coloration: Skin is not pale.      Findings: No erythema or rash.   Neurological:      General: No focal deficit present.      Mental Status: He is alert and oriented to person, place, and time.      Cranial Nerves: No cranial nerve deficit.      Coordination: Coordination normal.   Psychiatric:         Mood and Affect: Mood normal.         Behavior: Behavior normal.         Thought Content: Thought content normal.         Judgment: Judgment normal.       Laboratory/imaging results reviewed: d/w Pt     Latest Reference Range & Units 05/17/24 06:07 05/17/24 06:15 09/06/24 07:00 12/03/24 06:05   WBC 4.8 - 10.8 K/uL 6.0      RBC 4.70 - 6.10 M/uL 5.11      Hemoglobin 14.0 - 18.0 g/dL 16.4      Hematocrit 42.0 -  52.0 % 47.6      MCV 81.4 - 97.8 fL 93.2      MCH 27.0 - 33.0 pg 32.1      MCHC 32.3 - 36.5 g/dL 34.5      RDW 35.9 - 50.0 fL 42.5      Platelet Count 164 - 446 K/uL 301      MPV 9.0 - 12.9 fL 10.8      Sodium 135 - 145 mmol/L 141  142 141   Potassium 3.6 - 5.5 mmol/L 4.7  4.6 4.8   Chloride 96 - 112 mmol/L 106  108 106   Co2 20 - 33 mmol/L 23  23 23   Anion Gap 7.0 - 16.0  12.0  11.0 12.0   Glucose 65 - 99 mg/dL 98  106 (H) 90   Bun 8 - 22 mg/dL 23 (H)  22 25 (H)   Creatinine 0.50 - 1.40 mg/dL 1.51 (H)  1.76 (H) 1.38   GFR (CKD-EPI) >60 mL/min/1.73 m 2 47 !  39 ! 52 !   Calcium 8.5 - 10.5 mg/dL 9.6  9.3 9.4   Correct Calcium 8.5 - 10.5 mg/dL   9.0    AST(SGOT) 12 - 45 U/L   19    ALT(SGPT) 2 - 50 U/L   17    Alkaline Phosphatase 30 - 99 U/L   33    Total Bilirubin 0.1 - 1.5 mg/dL   0.9    Albumin 3.2 - 4.9 g/dL   4.4    Total Protein 6.0 - 8.2 g/dL   6.9    Globulin 1.9 - 3.5 g/dL   2.5    A-G Ratio g/dL   1.8    Fasting Status  Fasting      Cholesterol,Tot 100 - 199 mg/dL   173    Triglycerides 0 - 149 mg/dL   162 (H)    HDL >=40 mg/dL   51    LDL <100 mg/dL   90    Micro Alb Creat Ratio 0 - 30 mg/g  see below     Creatinine, Urine mg/dL  97.82     Microalbumin, Urine Random mg/dL  <1.2     Urobilinogen, Urine <=1.0 EU/dL  0.2  0.2   Color   Yellow  Yellow   Character   Clear  Clear   Specific Gravity <1.035   1.018  1.019   Ph 5.0 - 8.0   5.5  5.5   Glucose Negative mg/dL  Negative  Negative   Ketones Negative mg/dL  Negative  Negative   Bilirubin Negative   Negative  Negative   Occult Blood Negative   Negative  Negative   Protein Negative mg/dL  Negative  Negative   Nitrite Negative   Negative  Negative   Leukocyte Esterase Negative   Small !  Small !   Micro Urine Req   Microscopic  Microscopic   WBC /hpf  2-5 !  3-5 !   RBC 0 - 2 /hpf  0-2 !  0-2   Epithelial Cells 0 - 5 /hpf  Few  0-2   Bacteria None /hpf  Negative  None Seen   Ca Oxalate Crystal Absent /hpf    Present !   Hyaline Cast /lpf  0-2     Urine  Casts 0 - 2 /lpf    0-2   25-Hydroxy   Vitamin D 25 30 - 100 ng/mL 46  51    (H): Data is abnormally high  !: Data is abnormal           Assessment & Plan        1. Stage 3a chronic kidney disease (HCC)      Creat level stable at baseline -to monitor      Keep well hydrated      2. Renovascular hypertension      BP very well controlled    3. Renal artery stenosis (HCC)      Not significant -stable    4. Vitamin D deficiency      Vit D and PTH  well controlled      5. Microalbuminuria      mild  - MICROALBUMIN CREAT RATIO URINE; Future    6. Anemia, unspecified type      Hb stable  - CBC WITHOUT DIFFERENTIAL; Future  7.Nephrolithiasis: stable -f/u with Urology      Recs:  Continue current treatment  Low salt diet  Keep well hydrated  Monitor BP and call clinic if BP > 135/85  Avoid NSAID's  F/u in 6 months

## 2024-12-30 RX ORDER — FENOFIBRATE 160 MG/1
160 TABLET ORAL DAILY
Qty: 100 TABLET | Refills: 0 | Status: SHIPPED | OUTPATIENT
Start: 2024-12-30

## 2025-01-22 ENCOUNTER — APPOINTMENT (OUTPATIENT)
Dept: MEDICAL GROUP | Facility: MEDICAL CENTER | Age: 80
End: 2025-01-22
Payer: MEDICARE

## 2025-01-22 VITALS
BODY MASS INDEX: 27.84 KG/M2 | DIASTOLIC BLOOD PRESSURE: 70 MMHG | HEIGHT: 70 IN | TEMPERATURE: 97.2 F | WEIGHT: 194.45 LBS | HEART RATE: 80 BPM | OXYGEN SATURATION: 97 % | SYSTOLIC BLOOD PRESSURE: 128 MMHG | RESPIRATION RATE: 20 BRPM

## 2025-01-22 DIAGNOSIS — I10 ESSENTIAL HYPERTENSION: ICD-10-CM

## 2025-01-22 DIAGNOSIS — I70.0 ATHEROSCLEROSIS OF AORTA (HCC): ICD-10-CM

## 2025-01-22 DIAGNOSIS — E78.5 DYSLIPIDEMIA: ICD-10-CM

## 2025-01-22 DIAGNOSIS — N18.31 STAGE 3A CHRONIC KIDNEY DISEASE: ICD-10-CM

## 2025-01-22 PROCEDURE — 1126F AMNT PAIN NOTED NONE PRSNT: CPT | Performed by: STUDENT IN AN ORGANIZED HEALTH CARE EDUCATION/TRAINING PROGRAM

## 2025-01-22 PROCEDURE — 3078F DIAST BP <80 MM HG: CPT | Performed by: STUDENT IN AN ORGANIZED HEALTH CARE EDUCATION/TRAINING PROGRAM

## 2025-01-22 PROCEDURE — 3074F SYST BP LT 130 MM HG: CPT | Performed by: STUDENT IN AN ORGANIZED HEALTH CARE EDUCATION/TRAINING PROGRAM

## 2025-01-22 PROCEDURE — 99214 OFFICE O/P EST MOD 30 MIN: CPT | Performed by: STUDENT IN AN ORGANIZED HEALTH CARE EDUCATION/TRAINING PROGRAM

## 2025-01-22 RX ORDER — TAMSULOSIN HYDROCHLORIDE 0.4 MG/1
0.8 CAPSULE ORAL
COMMUNITY

## 2025-01-22 ASSESSMENT — ENCOUNTER SYMPTOMS
PALPITATIONS: 0
NAUSEA: 0
VOMITING: 0
HEADACHES: 0
WEIGHT LOSS: 0
FEVER: 0
WHEEZING: 0
DIZZINESS: 0
CHILLS: 0
SHORTNESS OF BREATH: 0

## 2025-01-22 ASSESSMENT — PAIN SCALES - GENERAL: PAINLEVEL_OUTOF10: NO PAIN

## 2025-01-22 ASSESSMENT — PATIENT HEALTH QUESTIONNAIRE - PHQ9: CLINICAL INTERPRETATION OF PHQ2 SCORE: 0

## 2025-01-22 ASSESSMENT — FIBROSIS 4 INDEX: FIB4 SCORE: 1.19

## 2025-01-22 NOTE — PROGRESS NOTES
"Subjective:     CC:     HPI:   Franklin presents today with      PMH HTN, hyperlipidemia, GERD CKD 3A, hx of renal artery stenosis, hx of nephrolithiasis, bph, hx of polyp - digestive health   Specialists  Dermatology  Gastroenterology-history of polyps GERD  Nephrology [facility]  Urology- hx of nephrolithiasis     Overall patient is doing fine.  Repeat blood work shows renal function has returned to baseline with creatinine at 1.38 from 1.76, urinalysis without proteinuria or occult blood, oxalate crystals present    He was previously referred to home sleep study.  Report remote history is apnea may be interested in seeing an ENT specialist.  Reports since his lower extremity has improved he has been able to sleep better    Health Maintenance:     ROS:  Review of Systems   Constitutional:  Negative for chills, fever and weight loss.   HENT:  Negative for hearing loss.    Respiratory:  Negative for shortness of breath and wheezing.    Cardiovascular:  Negative for chest pain and palpitations.   Gastrointestinal:  Negative for nausea and vomiting.   Genitourinary:  Negative for frequency and urgency.   Skin:  Negative for rash.   Neurological:  Negative for dizziness and headaches.       Objective:     Exam:  /70 (BP Location: Left arm, Patient Position: Sitting, BP Cuff Size: Adult)   Pulse 80   Temp 36.2 °C (97.2 °F) (Temporal)   Resp 20   Ht 1.787 m (5' 10.35\")   Wt 88.2 kg (194 lb 7.1 oz)   SpO2 97%   BMI 27.62 kg/m²  Body mass index is 27.62 kg/m².    Physical Exam  Constitutional:       Appearance: Normal appearance.   Cardiovascular:      Rate and Rhythm: Normal rate and regular rhythm.   Pulmonary:      Effort: Pulmonary effort is normal.      Breath sounds: Normal breath sounds.   Musculoskeletal:      Cervical back: Normal range of motion and neck supple.   Lymphadenopathy:      Cervical: No cervical adenopathy.   Neurological:      Mental Status: He is alert.           Labs:     Assessment & Plan: "     80 y.o. male with the following -     1. Stage 3a chronic kidney disease  Chronic, stable  Follows with nephrology  Last BMP showed improvement of creatinine back to baseline, without proteinuria or hematuria.  Did note oxalate stones  Patient has history of nephrolithiasis follows with urology and has plans for follow-up this year  Report also had ultrasound done at urology    2. Essential hypertension  Chronic, stable on losartan 100 mg daily blood pressure well-controlled    3. Dyslipidemia  Chronic, stable on lovastatin 10 mg daily LDL less than 100, mild triglyceridemia I recommend dietary changes    4. Atherosclerosis of aorta (HCC)  Chronic, stable blood pressure well-controlled on low-dose lovastatin 10 mg daily taking without any issues        HCC Gap Form    Diagnosis to address: N18.31 - Stage 3a chronic kidney disease  Assessment and plan: Chronic, stable. Continue with current defined treatment plan: Chronic, stable follows with nephrology and urology.  Renal function returned to baseline.  Without significant evidence of proteinuria or hematuria, oxalate crystals present patient continues to follow with urology. Follow-up at least annually.  Last edited 01/22/25 13:57 PST by Damien Padilla M.D.           Return in about 6 months (around 7/22/2025) for Lab review, Med check.    Please note that this dictation was created using voice recognition software. I have made every reasonable attempt to correct obvious errors, but I expect that there are errors of grammar and possibly content that I did not discover before finalizing the note.

## 2025-01-28 DIAGNOSIS — E78.5 DYSLIPIDEMIA: ICD-10-CM

## 2025-01-28 RX ORDER — LOVASTATIN 10 MG/1
10 TABLET ORAL EVERY EVENING
Qty: 100 TABLET | Refills: 2 | Status: SHIPPED | OUTPATIENT
Start: 2025-01-28

## 2025-02-14 ENCOUNTER — HOSPITAL ENCOUNTER (OUTPATIENT)
Dept: LAB | Facility: MEDICAL CENTER | Age: 80
End: 2025-02-14
Attending: PHYSICIAN ASSISTANT
Payer: MEDICARE

## 2025-02-14 ENCOUNTER — TELEPHONE (OUTPATIENT)
Dept: HEALTH INFORMATION MANAGEMENT | Facility: OTHER | Age: 80
End: 2025-02-14
Payer: MEDICARE

## 2025-02-14 PROCEDURE — 82565 ASSAY OF CREATININE: CPT

## 2025-02-14 PROCEDURE — 84520 ASSAY OF UREA NITROGEN: CPT

## 2025-02-14 PROCEDURE — 36415 COLL VENOUS BLD VENIPUNCTURE: CPT

## 2025-02-15 LAB
BUN SERPL-MCNC: 27 MG/DL (ref 8–22)
CREAT SERPL-MCNC: 1.65 MG/DL (ref 0.5–1.4)
GFR SERPLBLD CREATININE-BSD FMLA CKD-EPI: 42 ML/MIN/1.73 M 2

## 2025-02-18 ENCOUNTER — APPOINTMENT (OUTPATIENT)
Dept: RADIOLOGY | Facility: MEDICAL CENTER | Age: 80
End: 2025-02-18
Attending: PHYSICIAN ASSISTANT
Payer: MEDICARE

## 2025-02-22 ENCOUNTER — HOSPITAL ENCOUNTER (OUTPATIENT)
Dept: RADIOLOGY | Facility: MEDICAL CENTER | Age: 80
End: 2025-02-22
Attending: PHYSICIAN ASSISTANT
Payer: MEDICARE

## 2025-02-22 DIAGNOSIS — R31.0 GROSS HEMATURIA: ICD-10-CM

## 2025-02-22 PROCEDURE — 74176 CT ABD & PELVIS W/O CONTRAST: CPT

## 2025-03-07 ENCOUNTER — HOSPITAL ENCOUNTER (OUTPATIENT)
Facility: MEDICAL CENTER | Age: 80
End: 2025-03-07
Attending: STUDENT IN AN ORGANIZED HEALTH CARE EDUCATION/TRAINING PROGRAM
Payer: MEDICARE

## 2025-03-11 ENCOUNTER — HOSPITAL ENCOUNTER (OUTPATIENT)
Facility: MEDICAL CENTER | Age: 80
End: 2025-03-11
Payer: MEDICARE

## 2025-03-11 PROCEDURE — 87186 SC STD MICRODIL/AGAR DIL: CPT

## 2025-03-11 PROCEDURE — 87086 URINE CULTURE/COLONY COUNT: CPT

## 2025-03-11 PROCEDURE — 87077 CULTURE AEROBIC IDENTIFY: CPT

## 2025-03-12 DIAGNOSIS — J30.89 CHRONIC NONSEASONAL ALLERGIC RHINITIS DUE TO POLLEN: ICD-10-CM

## 2025-03-12 DIAGNOSIS — I10 ESSENTIAL HYPERTENSION: ICD-10-CM

## 2025-03-12 RX ORDER — FLUTICASONE PROPIONATE 50 MCG
SPRAY, SUSPENSION (ML) NASAL
Qty: 48 G | Refills: 1 | Status: SHIPPED | OUTPATIENT
Start: 2025-03-12

## 2025-03-12 RX ORDER — LOSARTAN POTASSIUM 100 MG/1
100 TABLET ORAL DAILY
Qty: 100 TABLET | Refills: 2 | Status: SHIPPED | OUTPATIENT
Start: 2025-03-12

## 2025-03-12 NOTE — TELEPHONE ENCOUNTER
Received request via: Pharmacy    Was the patient seen in the last year in this department? Yes    Does the patient have an active prescription (recently filled or refills available) for medication(s) requested? No    Pharmacy Name:  [facility] - [address]     Does the patient have nursing home Plus and need 100-day supply? (This applies to ALL medications) Yes, quantity updated to 100 days

## 2025-03-13 ENCOUNTER — HOSPITAL ENCOUNTER (OUTPATIENT)
Facility: MEDICAL CENTER | Age: 80
End: 2025-03-13
Attending: PHYSICIAN ASSISTANT
Payer: MEDICARE

## 2025-03-13 LAB
BACTERIA UR CULT: ABNORMAL
BACTERIA UR CULT: ABNORMAL
SIGNIFICANT IND 70042: ABNORMAL
SITE SITE: ABNORMAL
SOURCE SOURCE: ABNORMAL

## 2025-03-18 ENCOUNTER — HOSPITAL ENCOUNTER (OUTPATIENT)
Facility: MEDICAL CENTER | Age: 80
End: 2025-03-18
Attending: PHYSICIAN ASSISTANT
Payer: MEDICARE

## 2025-03-18 PROCEDURE — 87086 URINE CULTURE/COLONY COUNT: CPT

## 2025-03-19 ENCOUNTER — TELEPHONE (OUTPATIENT)
Dept: HEALTH INFORMATION MANAGEMENT | Facility: OTHER | Age: 80
End: 2025-03-19

## 2025-03-19 ENCOUNTER — APPOINTMENT (OUTPATIENT)
Dept: LAB | Facility: MEDICAL CENTER | Age: 80
End: 2025-03-19
Payer: MEDICARE

## 2025-03-20 LAB
BACTERIA UR CULT: NORMAL
SIGNIFICANT IND 70042: NORMAL
SITE SITE: NORMAL
SOURCE SOURCE: NORMAL

## 2025-05-05 RX ORDER — FENOFIBRATE 160 MG/1
160 TABLET ORAL DAILY
Qty: 100 TABLET | Refills: 2 | Status: SHIPPED | OUTPATIENT
Start: 2025-05-05

## 2025-05-05 NOTE — TELEPHONE ENCOUNTER
Received request via: Pharmacy    Was the patient seen in the last year in this department? Yes    Does the patient have an active prescription (recently filled or refills available) for medication(s) requested? No    Pharmacy Name:    [facility] - [address]          Does the patient have [facility] Plus and need 100-day supply? (This applies to ALL medications) Yes, quantity updated to 100 days

## 2025-06-04 ENCOUNTER — HOSPITAL ENCOUNTER (OUTPATIENT)
Dept: LAB | Facility: MEDICAL CENTER | Age: 80
End: 2025-06-04
Attending: INTERNAL MEDICINE
Payer: MEDICARE

## 2025-06-04 DIAGNOSIS — I15.0 RENOVASCULAR HYPERTENSION: ICD-10-CM

## 2025-06-04 DIAGNOSIS — I70.1 RENAL ARTERY STENOSIS (HCC): ICD-10-CM

## 2025-06-04 DIAGNOSIS — R80.9 MICROALBUMINURIA: ICD-10-CM

## 2025-06-04 DIAGNOSIS — N18.31 STAGE 3A CHRONIC KIDNEY DISEASE: ICD-10-CM

## 2025-06-04 DIAGNOSIS — D64.9 ANEMIA, UNSPECIFIED TYPE: ICD-10-CM

## 2025-06-04 LAB
ERYTHROCYTE [DISTWIDTH] IN BLOOD BY AUTOMATED COUNT: 46.2 FL (ref 35.9–50)
HCT VFR BLD AUTO: 45.8 % (ref 42–52)
HGB BLD-MCNC: 15.5 G/DL (ref 14–18)
MCH RBC QN AUTO: 32.2 PG (ref 27–33)
MCHC RBC AUTO-ENTMCNC: 33.8 G/DL (ref 32.3–36.5)
MCV RBC AUTO: 95.2 FL (ref 81.4–97.8)
PLATELET # BLD AUTO: 275 K/UL (ref 164–446)
PMV BLD AUTO: 10.7 FL (ref 9–12.9)
RBC # BLD AUTO: 4.81 M/UL (ref 4.7–6.1)
WBC # BLD AUTO: 6 K/UL (ref 4.8–10.8)

## 2025-06-04 PROCEDURE — 85027 COMPLETE CBC AUTOMATED: CPT

## 2025-06-04 PROCEDURE — 36415 COLL VENOUS BLD VENIPUNCTURE: CPT

## 2025-06-04 PROCEDURE — 82306 VITAMIN D 25 HYDROXY: CPT

## 2025-06-04 PROCEDURE — 82043 UR ALBUMIN QUANTITATIVE: CPT

## 2025-06-04 PROCEDURE — 80048 BASIC METABOLIC PNL TOTAL CA: CPT

## 2025-06-04 PROCEDURE — 82570 ASSAY OF URINE CREATININE: CPT

## 2025-06-05 LAB
25(OH)D3 SERPL-MCNC: 43 NG/ML (ref 30–100)
ANION GAP SERPL CALC-SCNC: 12 MMOL/L (ref 7–16)
BUN SERPL-MCNC: 26 MG/DL (ref 8–22)
CALCIUM SERPL-MCNC: 9.2 MG/DL (ref 8.5–10.5)
CHLORIDE SERPL-SCNC: 106 MMOL/L (ref 96–112)
CO2 SERPL-SCNC: 22 MMOL/L (ref 20–33)
CREAT SERPL-MCNC: 1.74 MG/DL (ref 0.5–1.4)
CREAT UR-MCNC: 143 MG/DL
GFR SERPLBLD CREATININE-BSD FMLA CKD-EPI: 40 ML/MIN/1.73 M 2
GLUCOSE SERPL-MCNC: 87 MG/DL (ref 65–99)
MICROALBUMIN UR-MCNC: 1.2 MG/DL
MICROALBUMIN/CREAT UR: 8 MG/G (ref 0–30)
POTASSIUM SERPL-SCNC: 4.8 MMOL/L (ref 3.6–5.5)
SODIUM SERPL-SCNC: 140 MMOL/L (ref 135–145)

## 2025-06-10 ENCOUNTER — OFFICE VISIT (OUTPATIENT)
Dept: URGENT CARE | Facility: CLINIC | Age: 80
End: 2025-06-10
Payer: MEDICARE

## 2025-06-10 VITALS
WEIGHT: 194 LBS | SYSTOLIC BLOOD PRESSURE: 110 MMHG | HEART RATE: 58 BPM | RESPIRATION RATE: 14 BRPM | OXYGEN SATURATION: 96 % | DIASTOLIC BLOOD PRESSURE: 78 MMHG | BODY MASS INDEX: 27.77 KG/M2 | HEIGHT: 70 IN | TEMPERATURE: 97.6 F

## 2025-06-10 DIAGNOSIS — H61.23 BILATERAL IMPACTED CERUMEN: ICD-10-CM

## 2025-06-10 PROCEDURE — 3074F SYST BP LT 130 MM HG: CPT | Performed by: PHYSICIAN ASSISTANT

## 2025-06-10 PROCEDURE — 99213 OFFICE O/P EST LOW 20 MIN: CPT | Performed by: PHYSICIAN ASSISTANT

## 2025-06-10 PROCEDURE — 3078F DIAST BP <80 MM HG: CPT | Performed by: PHYSICIAN ASSISTANT

## 2025-06-10 ASSESSMENT — FIBROSIS 4 INDEX: FIB4 SCORE: 1.31

## 2025-06-10 NOTE — PROGRESS NOTES
Subjective:   Franklin Mejía is a 80 y.o. male who presents today with   Chief Complaint   Patient presents with    Ear Fullness     Bilateral ear fullness x 4 days      Ear Fullness  This is a new problem. Episode onset: 4 days. The problem occurs constantly. The problem has been unchanged. Treatments tried: flush, peroxide. The treatment provided no relief.     PMH:  has a past medical history of Heart burn (2010), Hiatus hernia syndrome (2010), High cholesterol (2007), Hyperlipidemia, Hypertension, Indigestion (2010), Kidney stone, Other male erectile dysfunction (01/04/2021), and Urinary bladder disorder (2011).    He has no past medical history of Acute nasopharyngitis, Anesthesia, Anginal syndrome (HCC), Arrhythmia, Arthritis, Asthma, Blood clotting disorder (HCC), Bowel habit changes, Breath shortness, Bronchitis, Cancer (HCC), Carcinoma in situ of respiratory system, Cataract, Congestive heart failure (HCC), Continuous ambulatory peritoneal dialysis status ([facility]), Coughing blood, Dental disorder, Diabetes (HCC), Dialysis patient (HCC), Disorder of thyroid, Emphysema of lung (HCC), Glaucoma, Gynecological disorder, Heart murmur, Heart valve disease, Hemorrhagic disorder (HCC), Hepatitis A, Hepatitis B, Hepatitis C, Infectious disease, Jaundice, Myocardial infarct (HCC), Pacemaker, Pain, Pneumonia, Pregnant, Psychiatric problem, Rheumatic fever, Seizure (HCC), Sleep apnea, Snoring, Stroke (HCC), Tuberculosis, or Urinary incontinence.  MEDS: Current Medications[1]  ALLERGIES: Allergies[2]  SURGHX: Past Surgical History[3]  SOCHX:  reports that he quit smoking about 38 years ago. His smoking use included cigarettes. He started smoking about 53 years ago. He has a 15 pack-year smoking history. He has never used smokeless tobacco. He reports current alcohol use of about 0.6 oz of alcohol per week. He reports that he does not use drugs.  FH: Reviewed with patient, not pertinent to this visit.     Review of  "Systems   HENT:  Positive for ear pain (fullness) and hearing loss. Negative for ear discharge and tinnitus.       Objective:   /78 (BP Location: Left arm, Patient Position: Sitting, BP Cuff Size: Adult)   Pulse (!) 58   Temp 36.4 °C (97.6 °F) (Temporal)   Resp 14   Ht 1.778 m (5' 10\")   Wt 88 kg (194 lb)   SpO2 96%   BMI 27.84 kg/m²   Physical Exam  Vitals and nursing note reviewed.   Constitutional:       General: He is not in acute distress.     Appearance: Normal appearance. He is well-developed. He is not ill-appearing or toxic-appearing.   HENT:      Head: Normocephalic and atraumatic.      Right Ear: Hearing normal. There is impacted cerumen.      Left Ear: Hearing normal. There is impacted cerumen.   Cardiovascular:      Rate and Rhythm: Normal rate.   Pulmonary:      Effort: Pulmonary effort is normal.   Musculoskeletal:      Comments: Normal movement in all 4 extremities   Skin:     General: Skin is warm and dry.   Neurological:      Mental Status: He is alert.      Coordination: Coordination normal.   Psychiatric:         Mood and Affect: Mood normal.       Assessment/Plan:   Assessment    1. Bilateral impacted cerumen    Symptoms and presentation are consistent with bilateral impacted cerumen.  Medical assistant was able to irrigate the ears and remove cerumen bilaterally.  I was then able to use lighted ear curette to remove the other pieces of cerumen from the ear canals bilaterally.  Was able to visualize the TM and no signs of perforation or infection.    Differential diagnosis, natural history, supportive care, and indications for immediate follow-up discussed.   Patient given instructions and understanding of medications and treatment.    If not improving in 3-5 days, F/U with PCP or return to  if symptoms worsen.    Patient agreeable to plan.      Please note that this dictation was created using voice recognition software. I have made every reasonable attempt to correct obvious " errors, but I expect that there are errors of grammar and possibly content that I did not discover before finalizing the note.    Ji Delarosa PA-C         [1]   Current Outpatient Medications:     fenofibrate (TRIGLIDE) 160 MG tablet, TAKE 1 TABLET BY MOUTH DAILY, Disp: 100 Tablet, Rfl: 2    losartan (COZAAR) 100 MG Tab, TAKE 1 TABLET BY MOUTH DAILY, Disp: 100 Tablet, Rfl: 2    fluticasone (FLONASE) 50 MCG/ACT nasal spray, USE 2 SPRAYS IN BOTH NOSTRILS  ONCE DAILY, Disp: 48 g, Rfl: 1    lovastatin (MEVACOR) 10 MG tablet, TAKE 1 TABLET BY MOUTH IN THE  EVENING, Disp: 100 Tablet, Rfl: 2    tamsulosin (FLOMAX) 0.4 MG capsule, Take 0.8 mg by mouth 1/2 hour after breakfast., Disp: , Rfl:     amLODIPine (NORVASC) 2.5 MG Tab, TAKE 1 TABLET BY MOUTH DAILY, Disp: 100 Tablet, Rfl: 2    sildenafil (REVATIO) 20 MG tablet, sildenafil (pulmonary hypertension) 20 mg tablet  Take 2 tablets as needed by oral route as needed for 30 days., Disp: , Rfl:     pantoprazole (PROTONIX) 20 MG tablet, , Disp: , Rfl:     coenzyme Q-10 30 MG capsule, Take 60 mg by mouth every day., Disp: , Rfl:     Multiple Vitamin (MULTI VITAMIN PO), Take 1 Tab by mouth every day., Disp: , Rfl:     Cyanocobalamin (VITAMIN B-12) 5000 MCG SL Tab, Place 1 Tab under tongue every day., Disp: , Rfl:     Cholecalciferol (VITAMIN D3) 5000 UNITS Cap, Take 1 Cap by mouth every day., Disp: , Rfl:   [2]   Allergies  Allergen Reactions    Prilosec [Omeprazole] Rash     Mild rash   [3]   Past Surgical History:  Procedure Laterality Date    PB FIX QUAD/HAMSTR MUSC RUPT,PBIMARY Left 10/9/2024    Procedure: LEFT QUADRICEPS TENDON REPAIR;  Surgeon: Lauri Chacon M.D.;  Location: [facility];  Service: Orthopedics    LA CYSTOURETHROSCOPY N/A 3/8/2023    Procedure: CYSTOSCOPY AND LASER LITHOTRIPSY OF BLADDER STONES;  Surgeon: Emil Addison M.D.;  Location: [facility]ERY [facility];  Service: Urology    LASERTRIPSY N/A 3/8/2023    Procedure: LITHOTRIPSY,  USING LASER;  Surgeon: Emil Addison M.D.;  Location: SURGERY [facility];  Service: Urology    INGUINAL HERNIA REPAIR ROBOTIC Left 02/22/2019    Procedure: INGUINAL HERNIA REPAIR ROBOTIC - W/MESH PLACEMENT;  Surgeon: Washington Alonso M.D.;  Location: SURGERY [facility] DAY [facility];  Service: General    URETEROSCOPY Left 01/16/2017    Procedure: URETEROSCOPY;  Surgeon: Emil Addison M.D.;  Location: SURGERY [facility];  Service:     LASERTRIPSY  01/16/2017    Procedure: LASERTRIPSY - LITHO;  Surgeon: Emil Addison M.D.;  Location: SURGERY [facility];  Service:     OTHER ORTHOPEDIC SURGERY  2011    left knee meniscus    OTHER  [id number]    kidney stone     OTHER ABDOMINAL SURGERY  2018    Hernia surgery

## 2025-06-11 ENCOUNTER — OFFICE VISIT (OUTPATIENT)
Dept: NEPHROLOGY | Facility: MEDICAL CENTER | Age: 80
End: 2025-06-11
Payer: MEDICARE

## 2025-06-11 VITALS
BODY MASS INDEX: 27.2 KG/M2 | WEIGHT: 194.3 LBS | DIASTOLIC BLOOD PRESSURE: 62 MMHG | HEART RATE: 71 BPM | OXYGEN SATURATION: 93 % | TEMPERATURE: 98.1 F | SYSTOLIC BLOOD PRESSURE: 124 MMHG | HEIGHT: 71 IN

## 2025-06-11 DIAGNOSIS — D64.9 ANEMIA, UNSPECIFIED TYPE: ICD-10-CM

## 2025-06-11 DIAGNOSIS — N18.32 STAGE 3B CHRONIC KIDNEY DISEASE: Primary | ICD-10-CM

## 2025-06-11 DIAGNOSIS — I70.1 RENAL ARTERY STENOSIS (HCC): ICD-10-CM

## 2025-06-11 DIAGNOSIS — R80.9 MICROALBUMINURIA: ICD-10-CM

## 2025-06-11 DIAGNOSIS — I15.0 RENOVASCULAR HYPERTENSION: ICD-10-CM

## 2025-06-11 DIAGNOSIS — N20.0 CALCULUS OF KIDNEY: ICD-10-CM

## 2025-06-11 DIAGNOSIS — E55.9 VITAMIN D DEFICIENCY: ICD-10-CM

## 2025-06-11 PROCEDURE — G2211 COMPLEX E/M VISIT ADD ON: HCPCS | Performed by: INTERNAL MEDICINE

## 2025-06-11 PROCEDURE — 3074F SYST BP LT 130 MM HG: CPT | Performed by: INTERNAL MEDICINE

## 2025-06-11 PROCEDURE — 3078F DIAST BP <80 MM HG: CPT | Performed by: INTERNAL MEDICINE

## 2025-06-11 PROCEDURE — 99214 OFFICE O/P EST MOD 30 MIN: CPT | Performed by: INTERNAL MEDICINE

## 2025-06-11 ASSESSMENT — ENCOUNTER SYMPTOMS
FLANK PAIN: 0
CHILLS: 0
SINUS PAIN: 0
NAUSEA: 0
FEVER: 0
WEIGHT LOSS: 0
VOMITING: 0
WHEEZING: 0
SHORTNESS OF BREATH: 0
EYES NEGATIVE: 1
COUGH: 0
HEMOPTYSIS: 0
ORTHOPNEA: 0
ABDOMINAL PAIN: 0
PALPITATIONS: 0

## 2025-06-11 ASSESSMENT — FIBROSIS 4 INDEX: FIB4 SCORE: 1.31

## 2025-06-11 NOTE — PROGRESS NOTES
Subjective     Honorio Mejía is a 80 y.o. male who presents with Chronic Kidney Disease            HPI  Honorio is coming today for f/u  of CKD III  S/p urinary bladder 5 calculi removal  No recent UTI  No complaints  No dysuria/hematuria/flank pain  Good appetite and energy level  CKD IIIa - baseline creat level at 1.5-1.6 - worse to 1.7 -CKD IIIb  HTN: BP well controlled  WANDA -not significant  Nephrolithiasis/LUTS --f/u with Urology    Review of Systems   Constitutional:  Negative for chills, fever, malaise/fatigue and weight loss.   HENT:  Negative for congestion, hearing loss and sinus pain.    Eyes: Negative.    Respiratory:  Negative for cough, hemoptysis, shortness of breath and wheezing.    Cardiovascular:  Negative for chest pain, palpitations, orthopnea and leg swelling.   Gastrointestinal:  Negative for abdominal pain, nausea and vomiting.   Genitourinary:  Negative for dysuria, flank pain, frequency, hematuria and urgency.   Skin: Negative.    All other systems reviewed and are negative.         Past Medical History:   Diagnosis Date    Heart burn     Hiatus hernia syndrome     High cholesterol     Hyperlipidemia     Hypertension     pt states well controlled on meds    Indigestion 2010    Kidney stone     stones    Other male erectile dysfunction 2021    Urinary bladder disorder 2011    Bladder stone.       Family History   Problem Relation Age of Onset    Cancer Mother         . Complications of colon cancer    Heart Disease Brother     No Known Problems Maternal Grandmother     Hypertension Maternal Grandfather        Social History     Socioeconomic History    Marital status:     Highest education level: Bachelor's degree (e.g., BA, AB, BS)   Tobacco Use    Smoking status: Former     Current packs/day: 0.00     Average packs/day: 1 pack/day for 15.0 years (15.0 ttl pk-yrs)     Types: Cigarettes     Start date: 1972     Quit date: 1987     Years since  quittin.4    Smokeless tobacco: Never   Vaping Use    Vaping status: Never Used   Substance and Sexual Activity    Alcohol use: Yes     Alcohol/week: 0.6 oz     Types: 1 Standard drinks or equivalent per week     Comment: Occ 2 per week    Drug use: No    Sexual activity: Yes     Partners: Female     Social Drivers of Health     Financial Resource Strain: Low Risk  (3/4/2023)    Overall Financial Resource Strain (CARDIA)     Difficulty of Paying Living Expenses: Not hard at all   Food Insecurity: No Food Insecurity (3/4/2023)    Hunger Vital Sign     Worried About Running Out of Food in the Last Year: Never true     Ran Out of Food in the Last Year: Never true   Transportation Needs: No Transportation Needs (3/4/2023)    PRAPARE - Transportation     Lack of Transportation (Medical): No     Lack of Transportation (Non-Medical): No   Physical Activity: Sufficiently Active (3/4/2023)    Exercise Vital Sign     Days of Exercise per Week: 4 days     Minutes of Exercise per Session: 90 min   Stress: No Stress Concern Present (3/4/2023)    [employer] - Occupational Stress Questionnaire     Feeling of Stress : Only a little   Social Connections: Socially Integrated (3/4/2023)    Social Connection and Isolation Panel [NHANES]     Frequency of Communication with Friends and Family: More than three times a week     Frequency of Social Gatherings with Friends and Family: Once a week     Attends Faith Services: More than 4 times per year     Active Member of Clubs or Organizations: Yes     Attends Club or Organization Meetings: More than 4 times per year     Marital Status:    Housing Stability: Low Risk  (3/4/2023)    Housing Stability Vital Sign     Unable to Pay for Housing in the Last Year: No     Number of Places Lived in the Last Year: 1     Unstable Housing in the Last Year: No         Objective     /62   Pulse 71   Temp 36.7 °C (98.1 °F) (Temporal)   Ht 1.803 m (5'  "11\")   Wt 88.1 kg (194 lb 4.8 oz)   SpO2 93%   BMI 27.10 kg/m²      Physical Exam  Vitals reviewed.   Constitutional:       General: He is not in acute distress.     Appearance: Normal appearance. He is well-developed. He is not diaphoretic.   HENT:      Head: Normocephalic and atraumatic.      Nose: Nose normal.      Mouth/Throat:      Mouth: Mucous membranes are moist.      Pharynx: Oropharynx is clear.   Eyes:      Extraocular Movements: Extraocular movements intact.      Conjunctiva/sclera: Conjunctivae normal.      Pupils: Pupils are equal, round, and reactive to light.   Cardiovascular:      Rate and Rhythm: Normal rate and regular rhythm.      Pulses: Normal pulses.      Heart sounds: Normal heart sounds.   Pulmonary:      Effort: Pulmonary effort is normal. No respiratory distress.      Breath sounds: Normal breath sounds. No wheezing or rales.   Abdominal:      General: Bowel sounds are normal. There is no distension.      Palpations: Abdomen is soft. There is no mass.      Tenderness: There is no abdominal tenderness. There is no right CVA tenderness or left CVA tenderness.   Musculoskeletal:      Cervical back: Normal range of motion and neck supple.      Right lower leg: No edema.      Left lower leg: No edema.   Skin:     General: Skin is warm.      Coloration: Skin is not pale.      Findings: No erythema or rash.   Neurological:      General: No focal deficit present.      Mental Status: He is alert and oriented to person, place, and time.      Cranial Nerves: No cranial nerve deficit.      Coordination: Coordination normal.   Psychiatric:         Mood and Affect: Mood normal.         Behavior: Behavior normal.         Thought Content: Thought content normal.         Judgment: Judgment normal.       Laboratory/imaging results reviewed: d/w Pt     Latest Reference Range & Units 02/14/25 14:40 03/11/25 12:00 03/18/25 11:00 06/04/25 06:07 06/04/25 06:08   WBC 4.8 - 10.8 K/uL    6.0    RBC 4.70 - 6.10 " M/uL    4.81    Hemoglobin 14.0 - 18.0 g/dL    15.5    Hematocrit 42.0 - 52.0 %    45.8    MCV 81.4 - 97.8 fL    95.2    MCH 27.0 - 33.0 pg    32.2    MCHC 32.3 - 36.5 g/dL    33.8    RDW 35.9 - 50.0 fL    46.2    Platelet Count 164 - 446 K/uL    275    MPV 9.0 - 12.9 fL    10.7    Sodium 135 - 145 mmol/L    140    Potassium 3.6 - 5.5 mmol/L    4.8    Chloride 96 - 112 mmol/L    106    Co2 20 - 33 mmol/L    22    Anion Gap 7.0 - 16.0     12.0    Glucose 65 - 99 mg/dL    87    Bun 8 - 22 mg/dL 27 (H)   26 (H)    Creatinine 0.50 - 1.40 mg/dL 1.65 (H)   1.74 (H)    GFR (CKD-EPI) >60 mL/min/1.73 m 2 42 !   40 !    Calcium 8.5 - 10.5 mg/dL    9.2    Micro Alb Creat Ratio 0 - 30 mg/g     8   Creatinine, Urine mg/dL     143.00   Microalbumin, Urine Random mg/dL     1.2   25-Hydroxy   Vitamin D 25 30 - 100 ng/mL    43    Significant Indicator   POS ! NEG     Site   - -     Source   UR UR     (H): Data is abnormally high  !: Data is abnormal         Assessment & Plan        1. Stage 3a chronic kidney disease (HCC) -worse to CKD 3b      Creat level stable at baseline -to monitor      Keep well hydrated      2. Renovascular hypertension      BP very well controlled    3. Renal artery stenosis (HCC)      Not significant -stable    4. Vitamin D deficiency      Vit D and PTH  well controlled      5. Microalbuminuria      mild      6. Anemia, unspecified type      Hb stable    7.Nephrolithiasis: stable - s/p bladder calculi removal f/u with Urology        Recs:  Continue current treatment  Low salt diet  Keep well hydrated  Monitor BP and call clinic if BP > 135/85  Avoid NSAID's  F/u in 4 months

## 2025-07-23 ENCOUNTER — APPOINTMENT (OUTPATIENT)
Dept: MEDICAL GROUP | Facility: MEDICAL CENTER | Age: 80
End: 2025-07-23
Payer: MEDICARE

## 2025-08-26 ENCOUNTER — HOSPITAL ENCOUNTER (OUTPATIENT)
Dept: LAB | Facility: MEDICAL CENTER | Age: 80
End: 2025-08-26
Attending: STUDENT IN AN ORGANIZED HEALTH CARE EDUCATION/TRAINING PROGRAM
Payer: MEDICARE

## 2025-08-26 DIAGNOSIS — N18.32 STAGE 3B CHRONIC KIDNEY DISEASE: ICD-10-CM

## 2025-08-26 LAB
ANION GAP SERPL CALC-SCNC: 16 MMOL/L (ref 7–16)
BUN SERPL-MCNC: 26 MG/DL (ref 8–22)
CALCIUM SERPL-MCNC: 9.6 MG/DL (ref 8.5–10.5)
CHLORIDE SERPL-SCNC: 105 MMOL/L (ref 96–112)
CO2 SERPL-SCNC: 18 MMOL/L (ref 20–33)
CREAT SERPL-MCNC: 1.69 MG/DL (ref 0.5–1.4)
GFR SERPLBLD CREATININE-BSD FMLA CKD-EPI: 41 ML/MIN/1.73 M 2
GLUCOSE SERPL-MCNC: 90 MG/DL (ref 65–99)
POTASSIUM SERPL-SCNC: 4.9 MMOL/L (ref 3.6–5.5)
SODIUM SERPL-SCNC: 139 MMOL/L (ref 135–145)

## 2025-08-26 PROCEDURE — 80048 BASIC METABOLIC PNL TOTAL CA: CPT

## 2025-08-26 PROCEDURE — 36415 COLL VENOUS BLD VENIPUNCTURE: CPT

## 2025-08-28 DIAGNOSIS — I10 ESSENTIAL HYPERTENSION: ICD-10-CM

## 2025-08-28 RX ORDER — AMLODIPINE BESYLATE 2.5 MG/1
2.5 TABLET ORAL DAILY
Qty: 100 TABLET | Refills: 2 | Status: SHIPPED | OUTPATIENT
Start: 2025-08-28

## 2025-09-04 ENCOUNTER — APPOINTMENT (OUTPATIENT)
Dept: MEDICAL GROUP | Facility: MEDICAL CENTER | Age: 80
End: 2025-09-04
Payer: MEDICARE

## (undated) DEVICE — TUBE E-T HI-LO CUFF 7.0MM (10EA/PK)

## (undated) DEVICE — BANDAID X-LARGE 2 X 4 IN LF (50EA/BX)

## (undated) DEVICE — TUBING CLEARLINK DUO-VENT - C-FLO (48EA/CA)

## (undated) DEVICE — COVER LIGHT HANDLE ALC PLUS DISP (18EA/BX)

## (undated) DEVICE — PACK CYSTO III (2EA/CA)

## (undated) DEVICE — CHLORAPREP 26 ML APPLICATOR - ORANGE TINT(25/CA)

## (undated) DEVICE — GLOVE BIOGEL PI INDICATOR SZ 8.0 SURGICAL PF LF -(50/BX 4BX/CA)

## (undated) DEVICE — SUTURE 4-0 VICRYL PLUS FS-2 - 27 INCH (36/BX)

## (undated) DEVICE — LACTATED RINGERS INJ 1000 ML - (14EA/CA 60CA/PF)

## (undated) DEVICE — COVER TIP ENDOWRIST HOT SHEAR - (10EA/BX) DA VINCI

## (undated) DEVICE — KIT ANESTHESIA W/CIRCUIT & 3/LT BAG W/FILTER (20EA/CA)

## (undated) DEVICE — COVER FOOT UNIVERSAL DISP. - (25EA/CA)

## (undated) DEVICE — GOWN SURGEONS X-LARGE - DISP. (30/CA)

## (undated) DEVICE — TUBE CONNECT SUCTION CLEAR 120 X 1/4" (50EA/CA)"

## (undated) DEVICE — CONTAINER SPECIMEN BAG OR - STERILE 4 OZ W/LID (100EA/CA)

## (undated) DEVICE — GLOVE BIOGEL SZ 6.5 SURGICAL PF LTX (50PR/BX 4BX/CA)

## (undated) DEVICE — SET EXTENSION WITH 2 PORTS (48EA/CA) ***PART #2C8610 IS A SUBSTITUTE*****

## (undated) DEVICE — CONNECTOR HOSE NEPTUNE FOR CYSTO ROOM

## (undated) DEVICE — ELECTRODE DUAL RETURN W/ CORD - (50/PK)

## (undated) DEVICE — TROCAR 5X100 NON BLADED Z-TH - READ KII (6/BX)

## (undated) DEVICE — TUBE NG SALEM SUMP 16FR (50EA/CA)

## (undated) DEVICE — SUTURE GENERAL

## (undated) DEVICE — ENDOWRIST PRO GRASP - DA VINCI 10X'S REUSABLE

## (undated) DEVICE — MASK, LARYNGEAL AIRWAY #4

## (undated) DEVICE — FIBER LASER MOSES 550 UM (1/EA)

## (undated) DEVICE — MASK ANESTHESIA ADULT  - (100/CA)

## (undated) DEVICE — SENSOR SPO2 NEO LNCS ADHESIVE (20/BX) SEE USER NOTES

## (undated) DEVICE — SUCTION INSTRUMENT YANKAUER BULBOUS TIP W/O VENT (50EA/CA)

## (undated) DEVICE — EVACUATOR BLADDER ELLIK - (10/BX)

## (undated) DEVICE — ADHESIVE DERMABOND HVD MINI (12EA/BX)

## (undated) DEVICE — GOWN WARMING STANDARD FLEX - (30/CA)

## (undated) DEVICE — SODIUM CHL. IRRIGATION 0.9% 3000ML (4EA/CA 65CA/PF)

## (undated) DEVICE — NEEDLE DRIVER SUTURE CUT - DA VINCI 10X'S REUSABLE

## (undated) DEVICE — HEAD HOLDER JUNIOR/ADULT

## (undated) DEVICE — NEEDLE INSFL 120MM 14GA VRRS - (20/BX)

## (undated) DEVICE — SPONGE GAUZESTER 4 X 4 4PLY - (128PK/CA)

## (undated) DEVICE — TROCAR 12 X 150 KII FIOS Z THREAD (6EA/BX)

## (undated) DEVICE — NEPTUNE 4 PORT MANIFOLD - (20/PK)

## (undated) DEVICE — ELECTRODE 850 FOAM ADHESIVE - HYDROGEL RADIOTRNSPRNT (50/PK)

## (undated) DEVICE — SUTURE 0 COATED VICRYL 6-18IN - (12PK/BX)

## (undated) DEVICE — DRAPE 3 ARM DA VANCI SI - (5EA/CA)

## (undated) DEVICE — SET LEADWIRE 5 LEAD BEDSIDE DISPOSABLE ECG (1SET OF 5/EA)

## (undated) DEVICE — GLOVE BIOGEL SZ 7.5 SURGICAL PF LTX - (50PR/BX 4BX/CA)

## (undated) DEVICE — SCISSOR MONOPLR CRV(HOT SHEAR - DA VINCI 10X'S REUSABLE

## (undated) DEVICE — WATER IRRIG. STER 3000 ML - (4/CA)

## (undated) DEVICE — SLEEVE, VASO, THIGH, MED

## (undated) DEVICE — SET IRRIGATION CYSTOSCOPY Y-TYPE L81 IN (20EA/CA)

## (undated) DEVICE — SUTURE 2-0 30CM STRATAFIX SPIRAL PDO ***WAS PART #SXPD1B401 *****

## (undated) DEVICE — NEEDLE DRIVER MEGA SUTURECUT DA VINCI 15X'S REUSABLE

## (undated) DEVICE — BLADE SURGICAL CLIPPER - (50EA/CA)

## (undated) DEVICE — CANISTER SUCTION 3000ML MECHANICAL FILTER AUTO SHUTOFF MEDI-VAC NONSTERILE LF DISP  (40EA/CA)

## (undated) DEVICE — SUTURE 4-0 MONOCRYL PLUS PS-2 - 27 INCH (36/BX)

## (undated) DEVICE — GLOVE BIOGEL PI INDICATOR SZ 7.5 SURGICAL PF LF -(50/BX 4BX/CA)

## (undated) DEVICE — SODIUM CHL IRRIGATION 0.9% 1000ML (12EA/CA)

## (undated) DEVICE — Device

## (undated) DEVICE — DERMABOND ADVANCED - (12EA/BX)

## (undated) DEVICE — ZIPWIRE .038 STRAIGHT BENTSON TIP (5EA/BX)

## (undated) DEVICE — SUTURE2-0 20CM STRATAFIX SPIRAL PDS CT-1 (12EA/BX)

## (undated) DEVICE — SUTURE 2-0 VICRYL PLUS SH - 27 INCH (36/BX)

## (undated) DEVICE — GLOVE SZ 7 BIOGEL PI MICRO - PF LF (50PR/BX 4BX/CA)

## (undated) DEVICE — LASER FIBER HOLM 910 MICRON 100 WATT (5EA/BX)

## (undated) DEVICE — BAG URODRAIN WITH TUBING - (20/CA)

## (undated) DEVICE — SYSTEM CLEARIFY VISUALIZATION (10EA/PK)

## (undated) DEVICE — GLOVE SZ 7.5 BIOGEL PI MICRO - PF LF (50PR/BX)

## (undated) DEVICE — ROBOTIC SURGERY SERVICES

## (undated) DEVICE — GLOVE BIOGEL SZ 8 SURGICAL PF LTX - (50PR/BX 4BX/CA)

## (undated) DEVICE — SENSOR OXIMETER ADULT SPO2 RD SET (20EA/BX)

## (undated) DEVICE — TOWEL STOP TIMEOUT SAFETY FLAG (40EA/CA)

## (undated) DEVICE — OBTURATOR 8MM BLADELESS - (24EA/BX) DA VINCI

## (undated) DEVICE — KIT ROOM DECONTAMINATION

## (undated) DEVICE — PROTECTOR ULNA NERVE - (36PR/CA)